# Patient Record
Sex: MALE | Race: WHITE | NOT HISPANIC OR LATINO | Employment: OTHER | URBAN - NONMETROPOLITAN AREA
[De-identification: names, ages, dates, MRNs, and addresses within clinical notes are randomized per-mention and may not be internally consistent; named-entity substitution may affect disease eponyms.]

---

## 2017-07-14 ENCOUNTER — HOSPITAL ENCOUNTER (EMERGENCY)
Facility: HOSPITAL | Age: 22
Discharge: HOME | End: 2017-07-15
Attending: STUDENT IN AN ORGANIZED HEALTH CARE EDUCATION/TRAINING PROGRAM | Admitting: STUDENT IN AN ORGANIZED HEALTH CARE EDUCATION/TRAINING PROGRAM

## 2017-07-14 DIAGNOSIS — T17.208A FOREIGN BODY IN THROAT, INITIAL ENCOUNTER: Primary | ICD-10-CM

## 2017-07-14 DIAGNOSIS — T18.9XXA FOREIGN BODY ALIMENTARY TRACT: ICD-10-CM

## 2017-07-14 PROCEDURE — 25010000002 GLUCAGON (HUMAN RECOMBINANT) 1 MG RECONSTITUTED SOLUTION: Performed by: SURGERY

## 2017-07-14 PROCEDURE — 25010000002 LORAZEPAM PER 2 MG: Performed by: SURGERY

## 2017-07-14 PROCEDURE — 96374 THER/PROPH/DIAG INJ IV PUSH: CPT

## 2017-07-14 PROCEDURE — 99283 EMERGENCY DEPT VISIT LOW MDM: CPT

## 2017-07-14 PROCEDURE — 96375 TX/PRO/DX INJ NEW DRUG ADDON: CPT

## 2017-07-14 RX ORDER — SODIUM CHLORIDE 0.9 % (FLUSH) 0.9 %
10 SYRINGE (ML) INJECTION AS NEEDED
Status: DISCONTINUED | OUTPATIENT
Start: 2017-07-14 | End: 2017-07-15 | Stop reason: HOSPADM

## 2017-07-14 RX ORDER — LORAZEPAM 2 MG/ML
0.5 INJECTION INTRAMUSCULAR ONCE
Status: COMPLETED | OUTPATIENT
Start: 2017-07-14 | End: 2017-07-14

## 2017-07-14 RX ADMIN — GLUCAGON HYDROCHLORIDE 1 MG: 1 INJECTION, POWDER, FOR SOLUTION INTRAMUSCULAR; INTRAVENOUS; SUBCUTANEOUS at 23:44

## 2017-07-14 RX ADMIN — LORAZEPAM 0.5 MG: 2 INJECTION INTRAMUSCULAR; INTRAVENOUS at 23:43

## 2017-07-15 ENCOUNTER — ANESTHESIA (OUTPATIENT)
Dept: PERIOP | Facility: HOSPITAL | Age: 22
End: 2017-07-15

## 2017-07-15 ENCOUNTER — PREP FOR SURGERY (OUTPATIENT)
Dept: OTHER | Facility: HOSPITAL | Age: 22
End: 2017-07-15

## 2017-07-15 ENCOUNTER — ANESTHESIA EVENT (OUTPATIENT)
Dept: PERIOP | Facility: HOSPITAL | Age: 22
End: 2017-07-15

## 2017-07-15 VITALS
BODY MASS INDEX: 25.9 KG/M2 | RESPIRATION RATE: 20 BRPM | TEMPERATURE: 97.3 F | HEART RATE: 91 BPM | SYSTOLIC BLOOD PRESSURE: 123 MMHG | DIASTOLIC BLOOD PRESSURE: 84 MMHG | HEIGHT: 71 IN | WEIGHT: 185 LBS | OXYGEN SATURATION: 97 %

## 2017-07-15 DIAGNOSIS — K21.00 GASTROESOPHAGEAL REFLUX DISEASE WITH ESOPHAGITIS: Primary | ICD-10-CM

## 2017-07-15 PROCEDURE — 25010000002 DEXAMETHASONE PER 1 MG: Performed by: NURSE ANESTHETIST, CERTIFIED REGISTERED

## 2017-07-15 PROCEDURE — 25010000002 MEPERIDINE PER 100 MG: Performed by: NURSE ANESTHETIST, CERTIFIED REGISTERED

## 2017-07-15 PROCEDURE — 25010000002 MIDAZOLAM PER 1 MG: Performed by: NURSE ANESTHETIST, CERTIFIED REGISTERED

## 2017-07-15 PROCEDURE — 25010000002 METOCLOPRAMIDE PER 10 MG: Performed by: NURSE ANESTHETIST, CERTIFIED REGISTERED

## 2017-07-15 PROCEDURE — 25010000002 ONDANSETRON PER 1 MG: Performed by: NURSE ANESTHETIST, CERTIFIED REGISTERED

## 2017-07-15 PROCEDURE — 25010000002 PROPOFOL 1000 MG/100ML EMULSION: Performed by: NURSE ANESTHETIST, CERTIFIED REGISTERED

## 2017-07-15 PROCEDURE — 99245 OFF/OP CONSLTJ NEW/EST HI 55: CPT | Performed by: SURGERY

## 2017-07-15 RX ORDER — ONDANSETRON 2 MG/ML
INJECTION INTRAMUSCULAR; INTRAVENOUS AS NEEDED
Status: DISCONTINUED | OUTPATIENT
Start: 2017-07-15 | End: 2017-07-15 | Stop reason: SURG

## 2017-07-15 RX ORDER — GLYCOPYRROLATE 0.2 MG/ML
INJECTION INTRAMUSCULAR; INTRAVENOUS AS NEEDED
Status: DISCONTINUED | OUTPATIENT
Start: 2017-07-15 | End: 2017-07-15 | Stop reason: SURG

## 2017-07-15 RX ORDER — PROPOFOL 10 MG/ML
INJECTION, EMULSION INTRAVENOUS AS NEEDED
Status: DISCONTINUED | OUTPATIENT
Start: 2017-07-15 | End: 2017-07-15 | Stop reason: SURG

## 2017-07-15 RX ORDER — MIDAZOLAM HYDROCHLORIDE 1 MG/ML
INJECTION INTRAMUSCULAR; INTRAVENOUS AS NEEDED
Status: DISCONTINUED | OUTPATIENT
Start: 2017-07-15 | End: 2017-07-15 | Stop reason: SURG

## 2017-07-15 RX ORDER — OMEPRAZOLE 40 MG/1
40 CAPSULE, DELAYED RELEASE ORAL DAILY
Qty: 30 CAPSULE | Refills: 5 | Status: SHIPPED | OUTPATIENT
Start: 2017-07-15 | End: 2018-07-15

## 2017-07-15 RX ORDER — METOCLOPRAMIDE HYDROCHLORIDE 5 MG/ML
INJECTION INTRAMUSCULAR; INTRAVENOUS AS NEEDED
Status: DISCONTINUED | OUTPATIENT
Start: 2017-07-15 | End: 2017-07-15 | Stop reason: SURG

## 2017-07-15 RX ORDER — MEPERIDINE HYDROCHLORIDE 50 MG/ML
INJECTION INTRAMUSCULAR; INTRAVENOUS; SUBCUTANEOUS AS NEEDED
Status: DISCONTINUED | OUTPATIENT
Start: 2017-07-15 | End: 2017-07-15 | Stop reason: SURG

## 2017-07-15 RX ORDER — DEXAMETHASONE SODIUM PHOSPHATE 4 MG/ML
INJECTION, SOLUTION INTRA-ARTICULAR; INTRALESIONAL; INTRAMUSCULAR; INTRAVENOUS; SOFT TISSUE AS NEEDED
Status: DISCONTINUED | OUTPATIENT
Start: 2017-07-15 | End: 2017-07-15 | Stop reason: SURG

## 2017-07-15 RX ORDER — FAMOTIDINE 10 MG/ML
INJECTION, SOLUTION INTRAVENOUS
Status: DISCONTINUED
Start: 2017-07-15 | End: 2017-07-15 | Stop reason: HOSPADM

## 2017-07-15 RX ORDER — SODIUM CHLORIDE, SODIUM LACTATE, POTASSIUM CHLORIDE, CALCIUM CHLORIDE 600; 310; 30; 20 MG/100ML; MG/100ML; MG/100ML; MG/100ML
INJECTION, SOLUTION INTRAVENOUS CONTINUOUS PRN
Status: DISCONTINUED | OUTPATIENT
Start: 2017-07-15 | End: 2017-07-15 | Stop reason: SURG

## 2017-07-15 RX ORDER — PROPOFOL 10 MG/ML
INJECTION, EMULSION INTRAVENOUS AS NEEDED
Status: DISCONTINUED | OUTPATIENT
Start: 2017-07-15 | End: 2017-07-15

## 2017-07-15 RX ADMIN — FAMOTIDINE 20 MG: 10 INJECTION, SOLUTION INTRAVENOUS at 00:39

## 2017-07-15 RX ADMIN — LIDOCAINE HYDROCHLORIDE 40 MG: 20 INJECTION, SOLUTION INTRAVENOUS at 00:50

## 2017-07-15 RX ADMIN — ONDANSETRON 4 MG: 2 INJECTION INTRAMUSCULAR; INTRAVENOUS at 00:39

## 2017-07-15 RX ADMIN — DEXAMETHASONE SODIUM PHOSPHATE 8 MG: 4 INJECTION, SOLUTION INTRAMUSCULAR; INTRAVENOUS at 00:39

## 2017-07-15 RX ADMIN — METOCLOPRAMIDE 10 MG: 5 INJECTION, SOLUTION INTRAMUSCULAR; INTRAVENOUS at 00:39

## 2017-07-15 RX ADMIN — PROPOFOL 20 MG: 10 INJECTION, EMULSION INTRAVENOUS at 00:49

## 2017-07-15 RX ADMIN — GLYCOPYRROLATE 0.2 MG: 0.2 INJECTION, SOLUTION INTRAMUSCULAR; INTRAVENOUS at 00:39

## 2017-07-15 RX ADMIN — MEPERIDINE HYDROCHLORIDE 50 MG: 50 INJECTION INTRAMUSCULAR; INTRAVENOUS; SUBCUTANEOUS at 00:46

## 2017-07-15 RX ADMIN — PROPOFOL 20 MG: 10 INJECTION, EMULSION INTRAVENOUS at 00:56

## 2017-07-15 RX ADMIN — MIDAZOLAM HYDROCHLORIDE 2 MG: 1 INJECTION, SOLUTION INTRAMUSCULAR; INTRAVENOUS at 00:46

## 2017-07-15 RX ADMIN — SODIUM CHLORIDE, POTASSIUM CHLORIDE, SODIUM LACTATE AND CALCIUM CHLORIDE: 600; 310; 30; 20 INJECTION, SOLUTION INTRAVENOUS at 00:43

## 2017-07-15 RX ADMIN — PROPOFOL 20 MG: 10 INJECTION, EMULSION INTRAVENOUS at 00:52

## 2017-07-15 NOTE — DISCHARGE SUMMARY
24 hour discharge note:    Discharge home  Liquid diet  Advance slowly diet at home  Start on Prilosec OTC 20 mg 2 pills po q day  Followup with Sonia MACK 1 week

## 2017-07-15 NOTE — ADDENDUM NOTE
Addendum  created 07/15/17 1344 by Jairon Diaz, CRNA    Child order released for a procedure order, Order Canceled from Note

## 2017-07-15 NOTE — ED PROVIDER NOTES
Subjective   HPI Comments: 22-year-old male that presents with a food bolus stuck in his throat since 8:00 today.  His had previous similar problems in the past and has tried to get in to see a gastroenterologist but has had difficulty.  The last time have this done was several months ago in [facility].  He states he was eating a hot dog at 8:00 and a piece got stuck he is unable to swallow his saliva.  He also states that he cannot keep down liquids.    Patient is a 22 y.o. male presenting with foreign body.   Foreign Body   Location:  Swallowed  Suspected object:  Food  Pain quality:  Aching  Pain severity:  Mild  Timing:  Constant  Progression:  Unchanged  Chronicity:  Recurrent  Worsened by:  Eating  Ineffective treatments:  None tried  Associated symptoms: trouble swallowing    Risk factors: prior similar events        Review of Systems   HENT: Positive for trouble swallowing.    All other systems reviewed and are negative.      History reviewed. No pertinent past medical history.    No Known Allergies    Past Surgical History:   Procedure Laterality Date   • ESOPHAGOSCOPY / EGD         History reviewed. No pertinent family history.    Social History     Social History   • Marital status: Single     Spouse name: N/A   • Number of children: N/A   • Years of education: N/A     Social History Main Topics   • Smoking status: Never Smoker   • Smokeless tobacco: None   • Alcohol use No   • Drug use: None   • Sexual activity: Not Asked     Other Topics Concern   • None     Social History Narrative   • None           Objective   Physical Exam   Constitutional: He is oriented to person, place, and time. He appears well-developed and well-nourished.   HENT:   Head: Normocephalic and atraumatic.   Left Ear: External ear normal.   Eyes: EOM are normal. Pupils are equal, round, and reactive to light.   Neck: Normal range of motion.   Cardiovascular: Normal rate and regular rhythm.    Pulmonary/Chest: Effort normal  and breath sounds normal.   Abdominal: Soft. Bowel sounds are normal.   Musculoskeletal: Normal range of motion.   Neurological: He is alert and oriented to person, place, and time.   Skin: Skin is warm and dry.   Psychiatric: He has a normal mood and affect. His behavior is normal. Judgment and thought content normal.   Nursing note and vitals reviewed.      Procedures         ED Course  ED Course   Comment By Time   Discussed with Dr. Scott he recommended trying glucagon and something for relaxation he would talk to house supervisor about scheduling this procedure Abilio Bush Jr., PA-C 07/14 2323   Patients nurse received a call from the endoscopy nurse that patient would be going to the endoscopy suite for EGD Abilio Bush Jr., PA-C 07/15 0004                  MDM    Final diagnoses:   Foreign body in throat, initial encounter            Abilio Bush Jr., PA-C  07/15/17 0005       Abilio Bush Jr., PA-C  07/15/17 0006

## 2017-07-15 NOTE — ANESTHESIA POSTPROCEDURE EVALUATION
Patient: [place] Rock    Procedure Summary     Date Anesthesia Start Anesthesia Stop Room / Location    07/15/17 0043   [facility] /  [facility]       Procedure Diagnosis Surgeon Provider    ESOPHAGOGASTRODUODENOSCOPY with bx , removal foreign body (N/A Esophagus) No diagnosis on file. MD Flaco Palacio CRNA          Anesthesia Type: MAC, general  Last vitals  BP      Temp      Pulse     Resp      SpO2        Post Anesthesia Care and Evaluation    Patient location during evaluation: PACU  Patient participation: complete - patient participated  Level of consciousness: awake  Pain score: 0  Pain management: adequate  Airway patency: patent  Anesthetic complications: No anesthetic complications  PONV Status: none  Cardiovascular status: acceptable  Respiratory status: acceptable and nasal cannula  Hydration status: acceptable    Comments: vsss resp spont, reflexes intact, responsive, report given to pacu nurse, on o2 n/c

## 2017-07-15 NOTE — PLAN OF CARE
Problem: Perioperative Period (Adult)  Intervention: Promote Pulmonary Hygiene and Secretion Clearance    07/15/17 0137   Promote Aggressive Pulmonary Hygiene/Secretion Management   Cough And Deep Breathing done with encouragement       Intervention: Monitor/Manage Pain    07/15/17 0137   Safety Interventions   Medication Review/Management medications reviewed       Intervention: Promote Normothermia    07/15/17 0111   Cardiac Interventions   Warming Thermoregulation Maintenance skin exposure time minimized

## 2017-07-15 NOTE — ADDENDUM NOTE
Addendum  created 07/15/17 0814 by Jairon Diaz, CRNA    Child order released for a procedure order, Order Canceled from Note

## 2017-07-15 NOTE — H&P
Crow Donahue Rock    1995    Primary Care Provider: Silvano Cazares MD    Reason for Consultation: esophageal foreign body    Chief Complaint:   Chief Complaint   Patient presents with   • Foreign Body       Subjective .     History of present illness:  I did see the patient today as a consultation from the ER for evaluation and treatment of an esophageal foreign body.  Apparantly the patient did eat a hot dog earlier this evening and now cannot handle his saliva.  This has happened before, there is sharp epigastric pain, nonradiating in nature.  Inability to handle his saliva.    Review of Systems:  Constitutional:  Negative for chills, fever, and unexpected weight change.  HENT: Negative for trouble swallowing and voice change.  Eyes:  Negative for visual disturbance.  Respiratory:  Negative for apnea, cough, chest tightness, shortness of breath, and wheezing.  Cardiovascular:  Negative for chest pain, palpitations, and leg swelling.  Gastrointestinal:  Negative for abdominal distention, abdominal pain, anal bleeding, blood in stool, constipation, diarrhea, nausea, rectal pain, positive inability to handle saliva, + GERD  Musculoskeletal:  Negative for back pain, gait problem, and joint swelling.  Skin:  Negative for color change, rash, and wound  Neurological:  Negative for dizziness, syncope, speech difficulty, weakness, numbness, and headaches.  Hematological:  Negative for adenopathy.  Does not bruise/bleed easily.  Psychiatric/Behavioral:  Negative for confusion.  The patient is not nervous/anxious.        History:    History reviewed. No pertinent past medical history.    Past Surgical History:   Procedure Laterality Date   • ESOPHAGOSCOPY / EGD         History reviewed. No pertinent family history.    Social History     Social History   • Marital status: Single     Spouse name: N/A   • Number of children: N/A   • Years of education: N/A     Occupational History   • Not on file.     Social History  Main Topics   • Smoking status: Never Smoker   • Smokeless tobacco: Not on file   • Alcohol use No   • Drug use: Not on file   • Sexual activity: Not on file     Other Topics Concern   • Not on file     Social History Narrative   • No narrative on file       Allergies:  No Known Allergies    Medications:    Current Facility-Administered Medications:   •  famotidine (PEPCID) 10 MG/ML injection  - ADS Override Pull, , , ,   •  Insert peripheral IV, , , Once **AND** sodium chloride 0.9 % flush 10 mL, 10 mL, Intravenous, PRN, Abilio Bush Jr., JEISON  No current outpatient prescriptions on file.    Objective     Vital Signs:   Temp:  [98.2 °F (36.8 °C)] 98.2 °F (36.8 °C)  Heart Rate:  [78-83] 78  Resp:  [20] 20  BP: (133-134)/(88) 134/88    Physical Exam:   General Appearance:    Alert, cooperative, in no acute distress   Head:    Normocephalic, without obvious abnormality, atraumatic   Eyes:            Lids and lashes normal, conjunctivae and sclerae normal, no   icterus, no pallor, corneas clear, PERRLA   Throat:   No oral lesions, no thrush, oral mucosa moist   Neck:   No adenopathy, supple, trachea midline, no thyromegaly, no   carotid bruit, no JVD   Lungs:     Clear to auscultation,respirations regular, even and                  unlabored    Heart:    Regular rhythm and normal rate, normal S1 and S2, no            murmur, no gallop, no rub, no click   Chest Wall:    No abnormalities observed   Abdomen:     Normal bowel sounds, no masses, no organomegaly, soft        non-tender, non-distended, no guarding, no rebound                tenderness   Extremities:   Moves all extremities well, no edema, no cyanosis, no             redness   Pulses:   Pulses palpable and equal bilaterally   Skin:   No bleeding, bruising or rash   Lymph nodes:   No palpable adenopathy   Neurologic:   Cranial nerves 2 - 12 grossly intact, sensation intact, DTR       present and equal bilaterally   Results Review:   I reviewed the patient's  new clinical results.  I reviewed the patient's new imaging results and agree with the interpretation.    Assessment/Plan     1. Foreign body in throat, initial encounter        I did have a detailed and extensive discussion with the patient in the office today.  The full risks and benefits of operative versus nonoperative intervention were discussed with the paient, they understand, agree, and wish to proceed with the surgical treatment plan of EGD and possible foreign body removal and possible esophageal dilation.    I discussed the patients findings and my recommendations with patient.    Saroj Scott MD  07/15/17  12:35 AM

## 2017-07-15 NOTE — PLAN OF CARE
Problem: Patient Care Overview (Adult)  Goal: Plan of Care Review  Outcome: Outcome(s) achieved Date Met:  07/15/17

## 2017-07-15 NOTE — ANESTHESIA PREPROCEDURE EVALUATION
Anesthesia Evaluation     Patient summary reviewed and Nursing notes reviewed   no history of anesthetic complications:  NPO Solid Status: Waived due to emergency  NPO Liquid Status: Waived due to emergency     Airway   Dental      Pulmonary    (-) not a smoker  Cardiovascular   Exercise tolerance: unable to assess        Neuro/Psych  GI/Hepatic/Renal/Endo      Musculoskeletal     Abdominal    Substance History   (+) drug use ( ? hx of use)     OB/GYN          Other        ROS/Med Hx Other: Hx of dysphagia  ? Tolerance to anes meds  Npo after 2000 hot dog                                   Anesthesia Plan    ASA 2 - emergent     MAC and general   (Risks and benefits discussed including risk of aspiration, recall and dental damage. Possible intubation for airway protection. All patient questions answered. Will continue with POC.)  intravenous induction

## 2017-07-20 LAB
LAB AP CASE REPORT: NORMAL
Lab: NORMAL
PATH REPORT.FINAL DX SPEC: NORMAL

## 2017-08-02 ENCOUNTER — OFFICE VISIT (OUTPATIENT)
Dept: SURGERY | Facility: CLINIC | Age: 22
End: 2017-08-02

## 2017-08-02 VITALS
SYSTOLIC BLOOD PRESSURE: 120 MMHG | TEMPERATURE: 98.8 F | WEIGHT: 185 LBS | OXYGEN SATURATION: 98 % | HEART RATE: 70 BPM | BODY MASS INDEX: 25.9 KG/M2 | DIASTOLIC BLOOD PRESSURE: 80 MMHG | HEIGHT: 71 IN

## 2017-08-02 DIAGNOSIS — K29.50 CHRONIC GASTRITIS WITHOUT BLEEDING, UNSPECIFIED GASTRITIS TYPE: Primary | ICD-10-CM

## 2017-08-02 DIAGNOSIS — K21.00 GASTROESOPHAGEAL REFLUX DISEASE WITH ESOPHAGITIS: ICD-10-CM

## 2017-08-02 PROCEDURE — 99213 OFFICE O/P EST LOW 20 MIN: CPT | Performed by: SURGERY

## 2017-08-02 RX ORDER — CLOTRIMAZOLE AND BETAMETHASONE DIPROPIONATE 10; .64 MG/G; MG/G
1 CREAM TOPICAL 2 TIMES DAILY
COMMUNITY
Start: 2017-06-13 | End: 2017-08-11

## 2017-08-02 RX ORDER — ONDANSETRON 4 MG/1
TABLET, ORALLY DISINTEGRATING ORAL
COMMUNITY
Start: 2017-05-18 | End: 2017-08-11

## 2017-08-02 RX ORDER — LOPERAMIDE HYDROCHLORIDE 2 MG/1
2 CAPSULE ORAL 4 TIMES DAILY PRN
COMMUNITY
Start: 2017-05-18 | End: 2020-03-03

## 2017-08-02 NOTE — PROGRESS NOTES
Patient: Crow Rock    YOB: 1995    Date: 08/02/2017    Primary Care Provider: Silvano Cazares MD    Reason for Consultation: Follow-up EGD    Chief Complaint:   Chief Complaint   Patient presents with   • Follow-up     Follow up EGD       History of present illness:  I saw the patient in the office today as a followup from their recent EGD with biopsy, the pathology report did show minimal chronic gastritis.  They state that they have done well but he did have an episode of food getting stuck one time since the EGD but he was able to get it out. He is taking PPI at this time and this seems to help his chronic GERD.    Review of Systems   Constitutional: Negative for chills, fatigue and fever.   Respiratory: Negative for cough.    Cardiovascular: Negative for chest pain.   Gastrointestinal: Negative for abdominal pain, diarrhea, nausea and vomiting.       Vital Signs:   Temp:  [98.8 °F (37.1 °C)] 98.8 °F (37.1 °C)  Heart Rate:  [70] 70  BP: (120)/(80) 120/80    Allergies:  No Known Allergies    Medications:    Current Outpatient Prescriptions:   •  clotrimazole-betamethasone (LOTRISONE) 1-0.05 % cream, , Disp: , Rfl:   •  loperamide (IMODIUM) 2 MG capsule, , Disp: , Rfl:   •  omeprazole (PRILOSEC) 40 MG capsule, Take 1 capsule by mouth Daily., Disp: 30 capsule, Rfl: 5  •  ondansetron ODT (ZOFRAN-ODT) 4 MG disintegrating tablet, , Disp: , Rfl:     Physical Exam:   General Appearance:    Alert, cooperative, in no acute distress   Abdomen:     no masses, no organomegaly, soft non-tender, non-distended, no guarding, wounds are well healed, no evidence of recurrent hernia   Chest:      Clear toausculation            Cor:  Regular rate and rhythm      Assessment / Plan:    1. Chronic gastritis without bleeding, unspecified gastritis type    2. Gastroesophageal reflux disease with esophagitis        I did discuss the situation with the patient today in the office and they have done well from their  recent EGD with biopsy. I have told the patient that he needs to stay on his PPI for now.  I think that he needs to have another EGD with dilation due to continued difficulty with swallowing.  I did have a detailed and extensive discussion with the patient in the office today.  The full risks and benefits of operative versus nonoperative intervention were discussed with the paient, they understand, agree, and wish to proceed with the surgical treatment plan EGD with dilation.    Electronically signed by Saroj Scott MD  08/02/17

## 2017-08-24 ENCOUNTER — ANESTHESIA (OUTPATIENT)
Dept: GASTROENTEROLOGY | Facility: HOSPITAL | Age: 22
End: 2017-08-24

## 2017-08-24 ENCOUNTER — HOSPITAL ENCOUNTER (OUTPATIENT)
Facility: HOSPITAL | Age: 22
Setting detail: HOSPITAL OUTPATIENT SURGERY
Discharge: HOME | End: 2017-08-24
Attending: SURGERY | Admitting: SURGERY

## 2017-08-24 ENCOUNTER — ANESTHESIA EVENT (OUTPATIENT)
Dept: GASTROENTEROLOGY | Facility: HOSPITAL | Age: 22
End: 2017-08-24

## 2017-08-24 VITALS
OXYGEN SATURATION: 96 % | HEART RATE: 65 BPM | HEIGHT: 71 IN | SYSTOLIC BLOOD PRESSURE: 111 MMHG | DIASTOLIC BLOOD PRESSURE: 71 MMHG | WEIGHT: 185 LBS | BODY MASS INDEX: 25.9 KG/M2 | TEMPERATURE: 98 F | RESPIRATION RATE: 16 BRPM

## 2017-08-24 DIAGNOSIS — K29.50 CHRONIC GASTRITIS WITHOUT BLEEDING, UNSPECIFIED GASTRITIS TYPE: ICD-10-CM

## 2017-08-24 DIAGNOSIS — K21.00 GASTROESOPHAGEAL REFLUX DISEASE WITH ESOPHAGITIS: ICD-10-CM

## 2017-08-24 PROCEDURE — 25010000002 PROPOFOL 200 MG/20ML EMULSION: Performed by: NURSE ANESTHETIST, CERTIFIED REGISTERED

## 2017-08-24 PROCEDURE — 25010000002 MIDAZOLAM PER 1 MG: Performed by: NURSE ANESTHETIST, CERTIFIED REGISTERED

## 2017-08-24 PROCEDURE — C1726 CATH, BAL DIL, NON-VASCULAR: HCPCS | Performed by: SURGERY

## 2017-08-24 RX ORDER — PROPOFOL 10 MG/ML
INJECTION, EMULSION INTRAVENOUS AS NEEDED
Status: DISCONTINUED | OUTPATIENT
Start: 2017-08-24 | End: 2017-08-24 | Stop reason: SURG

## 2017-08-24 RX ORDER — SODIUM CHLORIDE 0.9 % (FLUSH) 0.9 %
3 SYRINGE (ML) INJECTION AS NEEDED
Status: DISCONTINUED | OUTPATIENT
Start: 2017-08-24 | End: 2017-08-24 | Stop reason: HOSPADM

## 2017-08-24 RX ORDER — MIDAZOLAM HYDROCHLORIDE 1 MG/ML
INJECTION INTRAMUSCULAR; INTRAVENOUS AS NEEDED
Status: DISCONTINUED | OUTPATIENT
Start: 2017-08-24 | End: 2017-08-24 | Stop reason: SURG

## 2017-08-24 RX ORDER — SODIUM CHLORIDE, SODIUM LACTATE, POTASSIUM CHLORIDE, CALCIUM CHLORIDE 600; 310; 30; 20 MG/100ML; MG/100ML; MG/100ML; MG/100ML
1000 INJECTION, SOLUTION INTRAVENOUS CONTINUOUS PRN
Status: DISCONTINUED | OUTPATIENT
Start: 2017-08-24 | End: 2017-08-24 | Stop reason: HOSPADM

## 2017-08-24 RX ADMIN — PROPOFOL 60 MG: 10 INJECTION, EMULSION INTRAVENOUS at 13:10

## 2017-08-24 RX ADMIN — MIDAZOLAM HYDROCHLORIDE 2 MG: 1 INJECTION, SOLUTION INTRAMUSCULAR; INTRAVENOUS at 13:06

## 2017-08-24 RX ADMIN — SODIUM CHLORIDE, POTASSIUM CHLORIDE, SODIUM LACTATE AND CALCIUM CHLORIDE 1000 ML: 600; 310; 30; 20 INJECTION, SOLUTION INTRAVENOUS at 10:28

## 2017-08-24 RX ADMIN — PROPOFOL 70 MG: 10 INJECTION, EMULSION INTRAVENOUS at 13:13

## 2017-08-24 RX ADMIN — LIDOCAINE HYDROCHLORIDE 100 MG: 20 INJECTION, SOLUTION INTRAVENOUS at 13:10

## 2017-08-24 NOTE — ANESTHESIA PREPROCEDURE EVALUATION
Anesthesia Evaluation     Patient summary reviewed and Nursing notes reviewed   no history of anesthetic complications:  NPO Solid Status: > 8 hours       Airway   Mallampati: I  TM distance: >3 FB  Neck ROM: full  no difficulty expected  Dental - normal exam   (+) poor dentition    Pulmonary - negative pulmonary ROS and normal exam   Cardiovascular - negative cardio ROS and normal exam    Rhythm: regular  Rate: normal        Neuro/Psych- negative ROS  GI/Hepatic/Renal/Endo    (+)  GERD well controlled,     Musculoskeletal (-) negative ROS    Abdominal  - normal exam    Abdomen: soft.  Bowel sounds: normal.   Substance History - negative use     OB/GYN negative ob/gyn ROS         Other - negative ROS                                       Anesthesia Plan    ASA 2     MAC   (Risks and benefits discussed including risk of aspiration, recall and dental damage. All patient questions answered. Will continue with POC.)  intravenous induction   Anesthetic plan and risks discussed with patient.

## 2017-08-24 NOTE — DISCHARGE INSTRUCTIONS
To assist you in voiding:  Drink plenty of fluids  Listen to running water while attempting to void.    If you are unable to urinate and you have an uncomfortable urge to void or it has been   6 hours since you were discharged, return to the Emergency Room

## 2017-08-24 NOTE — PLAN OF CARE
Problem: GI Endoscopy (Adult)  Goal: Signs and Symptoms of Listed Potential Problems Will be Absent or Manageable (GI Endoscopy)  Outcome: Ongoing (interventions implemented as appropriate)    08/24/17 1003   GI Endoscopy   Problems Assessed (GI Endoscopy) all   Problems Present (GI Endoscopy) none

## 2017-08-24 NOTE — ANESTHESIA POSTPROCEDURE EVALUATION
Patient: [place]    Procedure Summary     Date Anesthesia Start Anesthesia Stop Room / Location    08/24/17 1304 1322 [facility] ENDOSCOPY 3 / [facility] ENDOSCOPY       Procedure Diagnosis Surgeon Provider    ESOPHAGOGASTRODUODENOSCOPY ESOPHAGEAL DILATATION (N/A Esophagus) Gastroesophageal reflux disease with esophagitis; Chronic gastritis without bleeding, unspecified gastritis type  (Gastroesophageal reflux disease with esophagitis [K21.0]; Chronic gastritis without bleeding, unspecified gastritis type [K29.50]) MD Frederick Palacio CRNA          Anesthesia Type: MAC  Last vitals  BP   114/70 (08/24/17 1330)    Temp   98 °F (36.7 °C) (08/24/17 1330)    Pulse   69 (08/24/17 1330)   Resp   16 (08/24/17 1330)    SpO2   98 % (08/24/17 1330)      Post Anesthesia Care and Evaluation    Patient location during evaluation: bedside  Patient participation: complete - patient participated  Level of consciousness: awake and alert  Pain management: satisfactory to patient  Airway patency: patent  Anesthetic complications: No anesthetic complications  PONV Status: controlled  Cardiovascular status: acceptable and stable  Respiratory status: acceptable  Hydration status: acceptable

## 2017-08-29 LAB
LAB AP CASE REPORT: NORMAL
Lab: NORMAL
PATH REPORT.FINAL DX SPEC: NORMAL

## 2019-04-09 ENCOUNTER — ANESTHESIA EVENT (OUTPATIENT)
Dept: GASTROENTEROLOGY | Facility: HOSPITAL | Age: 24
End: 2019-04-09

## 2019-04-09 ENCOUNTER — ANESTHESIA (OUTPATIENT)
Dept: GASTROENTEROLOGY | Facility: HOSPITAL | Age: 24
End: 2019-04-09

## 2019-04-09 ENCOUNTER — HOSPITAL ENCOUNTER (EMERGENCY)
Facility: HOSPITAL | Age: 24
Discharge: HOME | End: 2019-04-09
Attending: EMERGENCY MEDICINE | Admitting: INTERNAL MEDICINE

## 2019-04-09 VITALS
BODY MASS INDEX: 30.74 KG/M2 | OXYGEN SATURATION: 97 % | DIASTOLIC BLOOD PRESSURE: 86 MMHG | WEIGHT: 219.6 LBS | SYSTOLIC BLOOD PRESSURE: 114 MMHG | HEART RATE: 84 BPM | RESPIRATION RATE: 16 BRPM | HEIGHT: 71 IN | TEMPERATURE: 97.9 F

## 2019-04-09 DIAGNOSIS — T18.9XXA FOREIGN BODY IN DIGESTIVE TRACT: ICD-10-CM

## 2019-04-09 PROCEDURE — 88342 IMHCHEM/IMCYTCHM 1ST ANTB: CPT | Performed by: INTERNAL MEDICINE

## 2019-04-09 PROCEDURE — 99284 EMERGENCY DEPT VISIT MOD MDM: CPT

## 2019-04-09 PROCEDURE — 25010000002 ONDANSETRON PER 1 MG: Performed by: NURSE ANESTHETIST, CERTIFIED REGISTERED

## 2019-04-09 PROCEDURE — 25010000002 ONDANSETRON PER 1 MG: Performed by: EMERGENCY MEDICINE

## 2019-04-09 PROCEDURE — S0260 H&P FOR SURGERY: HCPCS | Performed by: INTERNAL MEDICINE

## 2019-04-09 PROCEDURE — 88312 SPECIAL STAINS GROUP 1: CPT | Performed by: INTERNAL MEDICINE

## 2019-04-09 PROCEDURE — 96374 THER/PROPH/DIAG INJ IV PUSH: CPT

## 2019-04-09 PROCEDURE — 88305 TISSUE EXAM BY PATHOLOGIST: CPT | Performed by: INTERNAL MEDICINE

## 2019-04-09 PROCEDURE — 25010000002 PROPOFOL 10 MG/ML EMULSION: Performed by: NURSE ANESTHETIST, CERTIFIED REGISTERED

## 2019-04-09 PROCEDURE — 43247 EGD REMOVE FOREIGN BODY: CPT | Performed by: INTERNAL MEDICINE

## 2019-04-09 PROCEDURE — 25010000002 GLUCAGON (HUMAN RECOMBINANT) 1 MG RECONSTITUTED SOLUTION: Performed by: EMERGENCY MEDICINE

## 2019-04-09 PROCEDURE — 96376 TX/PRO/DX INJ SAME DRUG ADON: CPT

## 2019-04-09 PROCEDURE — C1726 CATH, BAL DIL, NON-VASCULAR: HCPCS | Performed by: INTERNAL MEDICINE

## 2019-04-09 PROCEDURE — 99283 EMERGENCY DEPT VISIT LOW MDM: CPT

## 2019-04-09 PROCEDURE — 43239 EGD BIOPSY SINGLE/MULTIPLE: CPT | Performed by: INTERNAL MEDICINE

## 2019-04-09 PROCEDURE — 43249 ESOPH EGD DILATION <30 MM: CPT | Performed by: INTERNAL MEDICINE

## 2019-04-09 PROCEDURE — 96375 TX/PRO/DX INJ NEW DRUG ADDON: CPT

## 2019-04-09 RX ORDER — PROPOFOL 10 MG/ML
VIAL (ML) INTRAVENOUS AS NEEDED
Status: DISCONTINUED | OUTPATIENT
Start: 2019-04-09 | End: 2019-04-09 | Stop reason: SURG

## 2019-04-09 RX ORDER — ONDANSETRON 2 MG/ML
INJECTION INTRAMUSCULAR; INTRAVENOUS AS NEEDED
Status: DISCONTINUED | OUTPATIENT
Start: 2019-04-09 | End: 2019-04-09 | Stop reason: SURG

## 2019-04-09 RX ORDER — MAGNESIUM HYDROXIDE 1200 MG/15ML
LIQUID ORAL AS NEEDED
Status: DISCONTINUED | OUTPATIENT
Start: 2019-04-09 | End: 2019-04-09 | Stop reason: HOSPADM

## 2019-04-09 RX ORDER — AMOXICILLIN 250 MG/1
250 CAPSULE ORAL 3 TIMES DAILY
COMMUNITY
End: 2019-05-06

## 2019-04-09 RX ORDER — PANTOPRAZOLE SODIUM 40 MG/10ML
40 INJECTION, POWDER, LYOPHILIZED, FOR SOLUTION INTRAVENOUS ONCE
Status: COMPLETED | OUTPATIENT
Start: 2019-04-09 | End: 2019-04-09

## 2019-04-09 RX ORDER — SODIUM CHLORIDE 9 MG/ML
125 INJECTION, SOLUTION INTRAVENOUS CONTINUOUS
Status: DISCONTINUED | OUTPATIENT
Start: 2019-04-09 | End: 2019-04-09 | Stop reason: HOSPADM

## 2019-04-09 RX ORDER — KETAMINE HYDROCHLORIDE 50 MG/ML
INJECTION, SOLUTION, CONCENTRATE INTRAMUSCULAR; INTRAVENOUS AS NEEDED
Status: DISCONTINUED | OUTPATIENT
Start: 2019-04-09 | End: 2019-04-09 | Stop reason: SURG

## 2019-04-09 RX ORDER — NITROGLYCERIN 0.4 MG/1
0.4 TABLET SUBLINGUAL
Status: DISCONTINUED | OUTPATIENT
Start: 2019-04-09 | End: 2019-04-09 | Stop reason: HOSPADM

## 2019-04-09 RX ORDER — PANTOPRAZOLE SODIUM 40 MG/1
TABLET, DELAYED RELEASE ORAL
Qty: 30 TABLET | Refills: 3 | Status: SHIPPED | OUTPATIENT
Start: 2019-04-09 | End: 2019-08-12 | Stop reason: SDUPTHER

## 2019-04-09 RX ORDER — ONDANSETRON 2 MG/ML
4 INJECTION INTRAMUSCULAR; INTRAVENOUS ONCE
Status: COMPLETED | OUTPATIENT
Start: 2019-04-09 | End: 2019-04-09

## 2019-04-09 RX ORDER — NITROGLYCERIN 0.4 MG/1
0.4 TABLET SUBLINGUAL ONCE
Status: COMPLETED | OUTPATIENT
Start: 2019-04-09 | End: 2019-04-09

## 2019-04-09 RX ADMIN — PROPOFOL 50 MG: 10 INJECTION, EMULSION INTRAVENOUS at 07:43

## 2019-04-09 RX ADMIN — SODIUM CHLORIDE 125 ML/HR: 9 INJECTION, SOLUTION INTRAVENOUS at 06:46

## 2019-04-09 RX ADMIN — PROPOFOL 100 MG: 10 INJECTION, EMULSION INTRAVENOUS at 07:27

## 2019-04-09 RX ADMIN — PROPOFOL 100 MG: 10 INJECTION, EMULSION INTRAVENOUS at 07:30

## 2019-04-09 RX ADMIN — ONDANSETRON 4 MG: 2 INJECTION INTRAMUSCULAR; INTRAVENOUS at 07:17

## 2019-04-09 RX ADMIN — GLUCAGON HYDROCHLORIDE 1 MG: KIT at 01:46

## 2019-04-09 RX ADMIN — PROPOFOL 50 MG: 10 INJECTION, EMULSION INTRAVENOUS at 07:33

## 2019-04-09 RX ADMIN — KETAMINE HYDROCHLORIDE 20 MG: 50 INJECTION, SOLUTION INTRAMUSCULAR; INTRAVENOUS at 07:31

## 2019-04-09 RX ADMIN — PROPOFOL 50 MG: 10 INJECTION, EMULSION INTRAVENOUS at 07:41

## 2019-04-09 RX ADMIN — PROPOFOL 50 MG: 10 INJECTION, EMULSION INTRAVENOUS at 07:36

## 2019-04-09 RX ADMIN — LIDOCAINE HYDROCHLORIDE 80 MG: 20 INJECTION, SOLUTION INTRAVENOUS at 07:27

## 2019-04-09 RX ADMIN — NITROGLYCERIN 0.4 MG: 0.4 TABLET SUBLINGUAL at 02:27

## 2019-04-09 RX ADMIN — GLUCAGON HYDROCHLORIDE 1 MG: 1 INJECTION, POWDER, FOR SOLUTION INTRAMUSCULAR; INTRAVENOUS; SUBCUTANEOUS at 02:25

## 2019-04-09 RX ADMIN — PANTOPRAZOLE SODIUM 40 MG: 40 INJECTION, POWDER, FOR SOLUTION INTRAVENOUS at 08:16

## 2019-04-09 RX ADMIN — ONDANSETRON 4 MG: 2 INJECTION INTRAMUSCULAR; INTRAVENOUS at 01:45

## 2019-04-09 RX ADMIN — NITROGLYCERIN 0.4 MG: 0.4 TABLET SUBLINGUAL at 01:25

## 2019-04-09 NOTE — ANESTHESIA PREPROCEDURE EVALUATION
Anesthesia Evaluation     Patient summary reviewed and Nursing notes reviewed   no history of anesthetic complications:  NPO Solid Status: > 8 hours  NPO Liquid Status: > 8 hours           Airway   Mallampati: I  TM distance: >3 FB  Neck ROM: full  no difficulty expected  Dental - normal exam   (+) poor dentition    Pulmonary - negative pulmonary ROS and normal exam   Cardiovascular - negative cardio ROS and normal exam    Rhythm: regular  Rate: normal        Neuro/Psych- negative ROS  GI/Hepatic/Renal/Endo    (+) obesity,  GERD,      Musculoskeletal (-) negative ROS    Abdominal  - normal exam    Abdomen: soft.  Bowel sounds: normal.   Substance History - negative use     OB/GYN negative ob/gyn ROS         Other - negative ROS                       Anesthesia Plan    ASA 2 - emergent     MAC   (Risks and benefits discussed including risk of aspiration, recall and dental damage. All patient questions answered. Will continue with POC.)  intravenous induction   Anesthetic plan, all risks, benefits, and alternatives have been provided, discussed and informed consent has been obtained with: patient.

## 2019-04-09 NOTE — ED NOTES
Pt undressed and placed in gown in anticipation of going to the OR this morning. Warm blanket and pillow provided. Pt up to date on treatment plan at this time. No further needs. Will con't to monitor.      Olimpia Justin RN  04/09/19 0321

## 2019-04-09 NOTE — ED PROVIDER NOTES
Subjective   History of Present Illness  Chief Complaint: Foreign body sensation to throat  History of Present Illness: Patient presents after eating dinner tonight approximately 730, he noticed he was unable to swallow a hamburger.  He does have a history of prior stricture dilation x2, last was 2 years ago denies any GI bleeding.  Patient states he is unable to swallow any liquids or solids.  Onset: Tonight approximately 7:30 PM  Duration: Continuous  Exacerbating / Alleviating factors: Worse with swallowing and p.o. intake  ASSOCIATED SYMPTOMS: Patient has painful swallowing as well      Nurses Notes reviewed and agree, including vitals, allergies, social history and prior medical history.     REVIEW OF SYSTEMS: All systems reviewed and not pertinent unless noted.  Positive for: Painful swallowing with inability to swallow even saliva  Negative for: GI bleeding      Past Medical History:   Diagnosis Date   • Dysphagia     MOTHER REPORTS PATIENT HAS RECENTLY HAS GOTTEN CHOKED EASILY AND GETS FOOD STUCK IN THROAT.   • GERD (gastroesophageal reflux disease)    • H/O seasonal allergies    • Wears glasses        No Known Allergies    Past Surgical History:   Procedure Laterality Date   • EAR TUBES     • ENDOSCOPY N/A 7/15/2017    Procedure: ESOPHAGOGASTRODUODENOSCOPY with bx , removal foreign body;  Surgeon: Saroj Scott MD;  Location: [facility] OR;  Service:    • ENDOSCOPY N/A 8/24/2017    Procedure: ESOPHAGOGASTRODUODENOSCOPY ESOPHAGEAL DILATATION;  Surgeon: Saroj Scott MD;  Location: [facility] ENDOSCOPY;  Service:    • ESOPHAGOSCOPY / EGD         Family History   Problem Relation Age of Onset   • No Known Problems Mother    • No Known Problems Father        Social History     Socioeconomic History   • Marital status: Single     Spouse name: Not on file   • Number of children: Not on file   • Years of education: Not on file   • Highest education level: Not on file   Tobacco Use   • Smoking status: Never Smoker   •  Smokeless tobacco: Never Used   Substance and Sexual Activity   • Alcohol use: No   • Drug use: No   • Sexual activity: Defer           Objective   Physical Exam  EXAM:    GENERAL APPEARANCE: Well developed, well nourished, in no acute distress.  VITAL SIGNS: per nursing, reviewed and noted  SKIN: no rashes, ulcerations or petechiae.  Head: Normocephalic, atraumatic.   EYES: perrla. EOMI.  ENT:  TM clear, posterior pharynx patent.  Voice is normal, attempts at swallowing are unsuccessful and patient is spitting his secretions.  LUNGS:  normal breath sounds. No retractions.   CARDIOVASCULAR:  regular rate and rhythm, no murmurs.  Good Peripheral pulses.  ABDOMEN: Soft, nontender, normal bowel sounds. No hernia. No ascites.  MUSCULOSKELETAL:  No tenderness. Full ROM. Strength and tone normal.  NEUROLOGIC: Alert, oriented x 3. No gross deficits.   NECK: Supple, symmetric. No tenderness, no masses. Full ROM  Back: full rom, no paraspinal spasm. No CVA tenderness.        No ER procedures were performed    ED Course  ED Course as of Apr 09 2242   Tue Apr 09, 2019   0305 Discussed with Dr. Herzog, will add on case this am, appr. 0700, for EGD. Requested npo  [PF]      ED Course User Index  [PF] Layo Celestin,                   MDM  Patient presents with a symptomatic meat bolus obstruction with a history of esophageal strictures requiring dilation x2.  Patient is unable to handle secretions, conservative measures are unable to remedy the situation. Discussed with gastroenterologist Dr. Harvey, who will proceed with endoscopy.     Final diagnoses:   Foreign body in digestive tract            Layo Celestin DO  04/09/19 2246

## 2019-04-09 NOTE — OP NOTE
PROCEDURE:  Upper Endoscopy with removal of esophageal foreign body, serial dilation of the mid and distal esophagus using 10-11-12 millimeters CRE balloon to 12 mm and biopsies.    DATE OF PROCEDURE: April 9, 2019.    REFERRING PROVIDER:  Silvano Cazares MD.     INSTRUMENT: Olympus GIF H 190 video endoscope     INDICATIONS OF THE PROCEDURE: This is a 23-year-old white male with history of recurrent dysphagia and esophageal foreign body sensation after eating last evening.  Currently, the patient is undergoing upper endoscopy for further evaluation and intervention.     BIOPSIES: Gastric-prepyloric ulcer edges.  Biopsies were obtained from the distal and mid esophagus.     MEDICATIONS:  MAC.     PHOTOGRAPHS:  Photographs were included in the medical records.     CONSENT/PREPROCEDURE EVALUATION:  Risks, benefits, alternatives and options of the procedure including risks of anesthesia/sedation were discussed and informed consent was obtained prior to the procedure. History and physical examination were performed and nothing precluded the test.     REPORT:  The patient was placed in left lateral decubitus position. Once under the influence of IV sedation, the instrument was inserted into the mouth and esophagus was intubated under direct vision without difficulty.    Visualized portions of the hypopharyngeal, and laryngopharyngeal and partial view of the vocal cords did not reveal significant pathology.    Esophagus: Findings and intervention:     A food bolus was noted lodged into the lower portion of the mid third of the esophagus.  This was removed with Hernandez net and recovered.  Rather tight concentric rings were noted within the mid and distal esophagus.  A 9.6 mm scope could be advanced with some difficulty.  These areas were biopsied.  Erosive distal esophagitis. LA class A.  Z line was noted to be around 38 cm.    A small sliding hiatal hernia less than 3 cm was noted.  No Liriano's esophagus was seen.   Distal and mid esophagus were dilated using 10-11-12 mm CRE balloon serially to 12 mm without difficulty under vision.  A small tear and some blood were noted.  No active bleeding was seen.     Stomach:  Antrum:  Erythematous-erosive gastritis.  Angulus, lesser and greater curves: Normal.  Retroflex examination: Sliding hiatal hernia.  Cardia and fundus:  Normal.     Body of the stomach: Erythematous gastritis.  Good distensibility of the stomach was achieved no giant folds were noted.       Pylorus and pyloric channel: 10 x 5 mm clean-based ulcer was noted at the prepyloric area.  This was biopsied.     Duodenum:  Bulb: Normal.  Second portion: normal.  No scalloping was seen in the second portion of duodenum.        The upper GI tract was decompressed and the scope was pulled out of the patient. The patient tolerated the procedure well.     DIAGNOSES:     1. Esophageal foreign body.  Status post removal.  2. Tight esophageal concentric rings.  Status post serial dilation to 12 mm.  3. Erosive distal esophagitis. LA class A.  4. Small sliding hiatal hernia less than 3 cm.  5. Erythematous-erosive gastritis.  6. 10 x 5 mm clean base prepyloric ulcer.  7. Changes seen within the esophagus are suggestive of eosinophilic esophagitis.    RECOMMENDATIONS:  1.  Dietary instructions.  2.  Pantoprazole 40 mg 1 p.o. q.a.m. 1/2 hour before breakfast.  3.  Follow biopsies.  4.  Follow up in office.  5.  Precautions should be taken regarding medications.  The patient is at significant risk for pill esophagitis.     Thank you very much for letting me participate in the care of this patient. Please do not hesitate to call me if you have any questions.

## 2019-04-09 NOTE — DISCHARGE INSTRUCTIONS
No pushing, pulling, tugging,  heavy lifting, or strenuous activity.  No major decision making, driving, or drinking alcoholic beverages for 24 hours. ( due to the medications you have  received)  Always use good hand hygiene/washing techniques.  NO driving while taking pain medications.    To assist you in voiding:  Drink plenty of fluids  Listen to running water while attempting to void.    If you are unable to urinate and you have an uncomfortable urge to void or it has been   6 hours since you were discharged, return to the Emergency Room  ************************************************************************************    Postprocedure instructions:  1. Nothing by mouth until fully alert and as specified below .  2. Bedrest until fully alert.  3. Vital signs as routine.    Diet:   Nothing by mouth for 60 minutes.  Please call after 60 minutes regarding progress.    Then if no chest pains the patient may have Clear liquids diet (No Sodas) for over night.  May advance to soft diet at 6 am on April 10, 2019 if no chest pains, Fever or chills, nausea vomiting or bleeding.    Other instructions:  1. The patient may eat in upright position, chew well, take small bites and take medications in upright position.   2. The patient should drink water after 3-4 bites, and liberally with medications.   3. The patient should remain upright for about 10 minutes after eating and taking oral medications.      Blood Thinner and other medications Directions:  Avoid Aspirin & other NSAIDS for 7 days.  Tylenol is okay.    Other instructions:  The patient should avoid medications that have a potential to cause pill esophagitis including potassium pills, tetracycline capsules, iron pills, NSAIDs and bisphosphonates.      Follow-up:    DR. RICK ZAMORA in 3-4 weeks.Office phone #  [phone].      ********************************************************************************************************************************************************    Notes to the patient and the family from Dr. Herzog.     Dear patient/family member,    Findings on today's procedure are as follows:    1. Removal of foreign body from the food pipe.  2. Esophagitis. Inflammation of the esophagus.  3. Tight esophageal rings. These areas were stretched to some degree.  However, the opening is still not up to the mala.  This will require further evaluation and stretching in the future.  4. Inflammation of the stomach.  Erosive gastritis.  5. Small sliding hiatal hernia.  6. No cancer. No active ulcers.   7. Stomach ulcer.  Clean base.  Risks of bleeding from clean base stomach ulcer or less than 5%.    Recommendations:    1. Protonix (Pantoprazole) tablet 40 mg tablet. Take 1 tablet orally in the morning half an hour before eating every day.  2. Other instructions as above.      Should you have more questions please do not hesitate to talk to the nurse who can call me and let me talk to you.     I hope you feel better.    Tyrone Herzog M.D., FACP, FACG.

## 2019-04-09 NOTE — H&P
Chief complaint: Dysphagia.  Foreign body sensation in the esophagus.  The patient has history of recurrent dysphagia.      History of present illness: About 2 years ago foreign body esophageal obstruction requiring endoscopic by surgical service-Dr. Scott.  This occurred again 5 weeks later.  The patient has reflux.  He denies shortness of breath.  The patient has difficulty swallowing his own saliva.  The patient has some mouth infection for which he has been taking amoxicillin.  The patient had some diarrhea last week.  He denies chest pains.  The patient gags at times.    Past medical history:   Past Medical History:   Diagnosis Date   • Dysphagia     MOTHER REPORTS PATIENT HAS RECENTLY HAS GOTTEN CHOKED EASILY AND GETS FOOD STUCK IN THROAT.   • GERD (gastroesophageal reflux disease)    • H/O seasonal allergies    • Wears glasses        Surgical history:    Past Surgical History:   Procedure Laterality Date   • EAR TUBES     • ENDOSCOPY N/A 7/15/2017    Procedure: ESOPHAGOGASTRODUODENOSCOPY with bx , removal foreign body;  Surgeon: Saroj Scott MD;  Location: [facility] OR;  Service:    • ENDOSCOPY N/A 8/24/2017    Procedure: ESOPHAGOGASTRODUODENOSCOPY ESOPHAGEAL DILATATION;  Surgeon: Saroj Scott MD;  Location: [facility] ENDOSCOPY;  Service:    • ESOPHAGOSCOPY / EGD         Social history:  Social History     Socioeconomic History   • Marital status: Single     Spouse name: Not on file   • Number of children: Not on file   • Years of education: Not on file   • Highest education level: Not on file   Tobacco Use   • Smoking status: Never Smoker   • Smokeless tobacco: Never Used   Substance and Sexual Activity   • Alcohol use: No   • Drug use: No   • Sexual activity: Defer       Allergies:  Patient has no known allergies.  Latex allergy: None  Contrast allergy: None    Medications:  Medications Prior to Admission   Medication Sig Dispense Refill Last Dose   • amoxicillin (AMOXIL) 250 MG capsule Take 250 mg by  "mouth 3 (Three) Times a Day.   4/8/2019 at 1500   • loperamide (IMODIUM) 2 MG capsule Take 2 mg by mouth 4 (Four) Times a Day As Needed for Diarrhea.   Past Week at Unknown time       Review of systems:   Constitutional: No recent: Fever, Weight loss,   Respiratory: No recent: SOB, Cough,   Cardiovascular: No recent: Chest Pains, congestive heart failure or arrhythmias.   Neurological: No recent: Seizures, CVA, TIA.   Genitourinary: No recent: Renal Failure, UTI.  Endocrine: No recent: Worsening of diabetes or thyroid disease.  Musculoskeletal: No recent: Joint swelling.  Hem. Oncology: No recent: Anemia or bleeding.  Psychiatric: No recent: Worsening of depression or anxiety.     VITAL SIGNS:    Blood pressure 122/74, pulse 71, temperature 98.3 °F (36.8 °C), resp. rate 16, height 180.3 cm (71\"), weight 99.6 kg (219 lb 9.6 oz), SpO2 98 %.    PHYSICAL EXAMINATION:   HEENT: Normal.   Lungs: Clear to auscultation.  Heart: No S3, no murmur.    Abdomen: Soft.  BS+ ND, NT  Extremities: No edema.  No cyanosis.  Neuro: Alert X 3. No focal deficit.    Assessment: Recurrent dysphagia.  Esophageal foreign body sensation likely secondary to impacted meat bolus.    Plan:   EGD with removal of foreign body.  Risks/Benefits:  The potential benefits, risk and/or side effects of the procedure and alternatives have been discussed with the patient/authorized representative and questions were answered.  A possibility of endotracheal intubation to protect the upper airway was also discussed with the patient and his mother if necessary.    "

## 2019-04-09 NOTE — ANESTHESIA POSTPROCEDURE EVALUATION
Patient: [place]    Procedure Summary     Date:  04/09/19 Room / Location:  [facility] ENDOSCOPY 2 / [facility] ENDOSCOPY    Anesthesia Start:  0715 Anesthesia Stop:      Procedure:  ESOPHAGOGASTRODUODENOSCOPY FOR FOREIGN BODY, biopsy, and esophageal dilitation (N/A Esophagus) Diagnosis:  (Food Bolus)    Surgeon:  Tyrone Herzog MD Provider:  Rios Choudhury CRNA    Anesthesia Type:  MAC ASA Status:  2 - Emergent          Anesthesia Type: MAC  Last vitals  BP   91/41   Temp   98   Pulse   86   Resp   22   SpO2   97     Post Anesthesia Care and Evaluation    Patient location during evaluation: PACU  Patient participation: complete - patient participated  Level of consciousness: awake and alert  Pain score: 0  Pain management: satisfactory to patient  Airway patency: patent  Anesthetic complications: No anesthetic complications  PONV Status: none  Cardiovascular status: acceptable and stable  Respiratory status: acceptable, room air and nasal cannula  Hydration status: acceptable

## 2019-04-09 NOTE — ED NOTES
DR. ZAMORA CALLED PER DR. VELÁSQUEZ, CALL SENT TO HIM @ THIS TIME.     Mera Garibay  04/09/19 0252

## 2019-05-06 ENCOUNTER — PREP FOR SURGERY (OUTPATIENT)
Dept: OTHER | Facility: HOSPITAL | Age: 24
End: 2019-05-06

## 2019-05-06 ENCOUNTER — OFFICE VISIT (OUTPATIENT)
Dept: GASTROENTEROLOGY | Facility: CLINIC | Age: 24
End: 2019-05-06

## 2019-05-06 VITALS
DIASTOLIC BLOOD PRESSURE: 93 MMHG | HEART RATE: 90 BPM | HEIGHT: 71 IN | RESPIRATION RATE: 20 BRPM | TEMPERATURE: 98.5 F | BODY MASS INDEX: 30.52 KG/M2 | SYSTOLIC BLOOD PRESSURE: 131 MMHG | WEIGHT: 218 LBS

## 2019-05-06 DIAGNOSIS — R12 HEARTBURN: ICD-10-CM

## 2019-05-06 DIAGNOSIS — R13.19 OTHER DYSPHAGIA: Primary | ICD-10-CM

## 2019-05-06 DIAGNOSIS — R19.7 DIARRHEA, UNSPECIFIED TYPE: ICD-10-CM

## 2019-05-06 DIAGNOSIS — R13.10 DYSPHAGIA: Primary | ICD-10-CM

## 2019-05-06 PROCEDURE — 99214 OFFICE O/P EST MOD 30 MIN: CPT | Performed by: INTERNAL MEDICINE

## 2019-05-06 RX ORDER — SODIUM CHLORIDE 9 MG/ML
70 INJECTION, SOLUTION INTRAVENOUS CONTINUOUS PRN
Status: CANCELLED | OUTPATIENT
Start: 2019-05-14

## 2019-05-06 NOTE — H&P (VIEW-ONLY)
Chief Complaint   Patient presents with   • Difficulty Swallowing     History of Present Illness     The patient has difficulty swallowing off and for the last 2 years. The symptom is rather severe, occurs on daily basis and is associated mostly with solid foods.  The symptoms are somewhat progressive progressive.  The patient points towards the lower substernal area.  There is no associated weight loss.  In April 2019 the patient presented to [facility] emergency room with esophageal foreign body sensation.  He had undergone an upper endoscopy with removal of the esophageal foreign body.  He was found to have tight esophageal strictures which were serially dilated to 12 mm.  The patient was also found to have prepyloric ulcer.  The patient also has history of esophageal foreign body that required endoscopic intervention.  The patient has history of reflux which has improved.  The patient has history of diarrhea off-and-on for the last 2-3 years.  Severity is mild, frequency of bowel movements being 2-3 times a day.  The stools are described as loose and occasionally watery.  Occasionally, there is no nocturnal element of diarrhea. The diarrhea is not associated with tenesmus.  There is no history of significant consumption of milk or ice cream.  The patient denies taking laxatives, using magnesium based products, drinking herbal tea, senna preparations, aloe vera juice, nutritional supplements, sugar free candies or colon cleansers.  There is no constipation.    He denies abdominal pain.  There is no nausea or vomiting.    There is no overt GI bleed (hematemesis, melena or hematochezia).  He denies history of liver or pancreatic disease.  There is no family history of colon cancer, inflammatory bowel disease or chronic liver disease.       Review of Systems   Constitutional: Negative for appetite change, chills, fatigue, fever and unexpected weight change.   HENT: Negative for mouth sores, nosebleeds  and trouble swallowing.    Eyes: Negative for discharge and redness.   Respiratory: Negative for apnea, cough and shortness of breath.    Cardiovascular: Negative for chest pain, palpitations and leg swelling.   Gastrointestinal: Negative for abdominal distention, abdominal pain, anal bleeding, blood in stool, constipation, diarrhea, nausea and vomiting.   Endocrine: Negative for cold intolerance, heat intolerance and polydipsia.   Genitourinary: Negative for dysuria, hematuria and urgency.   Musculoskeletal: Negative for arthralgias, joint swelling and myalgias.   Skin: Negative for rash.   Allergic/Immunologic: Negative for food allergies and immunocompromised state.   Neurological: Negative for dizziness, seizures, syncope and headaches.   Hematological: Negative for adenopathy. Does not bruise/bleed easily.   Psychiatric/Behavioral: Negative for dysphoric mood. The patient is not nervous/anxious and is not hyperactive.      Patient Active Problem List   Diagnosis   • Gastroesophageal reflux disease with esophagitis   • Chronic gastritis without bleeding   • Dysphagia   • Heartburn     Past Medical History:   Diagnosis Date   • Dysphagia     MOTHER REPORTS PATIENT HAS RECENTLY HAS GOTTEN CHOKED EASILY AND GETS FOOD STUCK IN THROAT.   • GERD (gastroesophageal reflux disease)    • H/O seasonal allergies    • Wears glasses      Past Surgical History:   Procedure Laterality Date   • EAR TUBES     • ENDOSCOPY N/A 7/15/2017    Procedure: ESOPHAGOGASTRODUODENOSCOPY with bx , removal foreign body;  Surgeon: Saroj Scott MD;  Location: [facility] OR;  Service:    • ENDOSCOPY N/A 8/24/2017    Procedure: ESOPHAGOGASTRODUODENOSCOPY ESOPHAGEAL DILATATION;  Surgeon: Saroj Scott MD;  Location: [facility] ENDOSCOPY;  Service:    • ENDOSCOPY N/A 4/9/2019    Procedure: ESOPHAGOGASTRODUODENOSCOPY FOR FOREIGN BODY, biopsy, and esophageal dilitation;  Surgeon: Tyrone Herzog MD;  Location: [facility] ENDOSCOPY;  Service: Gastroenterology  "  • ESOPHAGOSCOPY / EGD       Family History   Problem Relation Age of Onset   • No Known Problems Mother    • No Known Problems Father      Social History     Tobacco Use   • Smoking status: Never Smoker   • Smokeless tobacco: Never Used   Substance Use Topics   • Alcohol use: No       Current Outpatient Medications:   •  loperamide (IMODIUM) 2 MG capsule, Take 2 mg by mouth 4 (Four) Times a Day As Needed for Diarrhea., Disp: , Rfl:   •  pantoprazole (PROTONIX) 40 MG EC tablet, Take 1 tablet by mouth 30 minutes before breakfast daily., Disp: 30 tablet, Rfl: 3    No Known Allergies    Blood pressure 131/93, pulse 90, temperature 98.5 °F (36.9 °C), resp. rate 20, height 180.3 cm (71\"), weight 98.9 kg (218 lb).    Physical Exam   Constitutional: He is oriented to person, place, and time. He appears well-developed and well-nourished. No distress.   HENT:   Head: Normocephalic and atraumatic.   Right Ear: Hearing and external ear normal.   Left Ear: Hearing and external ear normal.   Nose: Nose normal.   Mouth/Throat: Oropharynx is clear and moist and mucous membranes are normal. Mucous membranes are not pale, not dry and not cyanotic. No oral lesions. No oropharyngeal exudate.   Eyes: Conjunctivae and EOM are normal. Right eye exhibits no discharge. Left eye exhibits no discharge. No scleral icterus.   Neck: Trachea normal. Neck supple. No JVD present. No edema present. No thyroid mass and no thyromegaly present.   Cardiovascular: Normal rate, regular rhythm, S2 normal and normal heart sounds. Exam reveals no gallop, no S3 and no friction rub.   No murmur heard.  Pulmonary/Chest: Effort normal and breath sounds normal. No respiratory distress. He has no wheezes. He has no rales. He exhibits no tenderness.   Abdominal: Soft. Normal appearance and bowel sounds are normal. He exhibits no distension, no ascites and no mass. There is no splenomegaly or hepatomegaly. There is no tenderness. There is no rigidity, no rebound " and no guarding. No hernia.   Musculoskeletal: He exhibits no tenderness or deformity.     Vascular Status -  His right foot exhibits no edema. His left foot exhibits no edema.  Lymphadenopathy:     He has no cervical adenopathy.        Left: No supraclavicular adenopathy present.   Neurological: He is alert and oriented to person, place, and time. He has normal strength. No cranial nerve deficit or sensory deficit. He exhibits normal muscle tone. Coordination normal.   Skin: No rash noted. He is not diaphoretic. No cyanosis. No pallor. Nails show no clubbing.   Psychiatric: He has a normal mood and affect. His behavior is normal. Judgment and thought content normal.   Nursing note and vitals reviewed.  Stigmata of chronic liver disease:  None.  Asterixis:  None.    Procedures:  Upon review of medical records:     Dated April 9, 2019 the patient underwent an upper endoscopy which revealed: Esophageal foreign body.  Status post removal.  Tight esophageal concentric rings.  Status post serial dilation to 12 mm.  Erosive distal esophagitis.  LA class A.  Small sliding hiatal hernia less than 3 cm.  Erythematous-erosive gastritis.  10 x 5 mm clean-based prepyloric ulcer.  Changes seen within the esophagus are suggestive of eosinophilic esophagitis.  Stomach biopsy revealed antral mucosa with reactive gastropathy and mild to moderate chronic inflammation.  Negative for Helicobacter pylori by immunohistochemical stain.  Negative for intestinal metaplasia, dysplasia or malignancy.  Esophagus, biopsy revealed squamous mucosa with reactive changes, intraepithelial eosinophils and surface erosion.  Rare fungal organisms morphologically consistent with Candida species identified by PAS special stain.  Negative for glandular mucosa, intestinal metaplasia, dysplasia or malignancy.  Sections of the mid and distal esophageal biopsy show squamous mucosa with marked spongiosis, expansion of the basal layer, and mixed inflammatory  infiltrate with neutrophils and eosinophils (up to 25 intraepithelial eosinophils per high magnification field).  There are focal eosinophilic microabscesses, and the superficial mucosa shows areas of erosion.  A PAS special stain shows rare fungal organisms morphologically consistent with Candida species.  The differential diagnosis for this degree of eosinophilia and histologic changes include eosinophilic esophagitis, proton pump inhibitor therapy response and esophageal eosinophilia, and gastroesophageal reflux.  Clinical and endoscopic correlation is recommended.      Assessment:      ICD-10-CM ICD-9-CM   1. Other dysphagia R13.19 787.29   2. Heartburn R12 787.1   3. Diarrhea, unspecified type R19.7 787.91         Discussion:  1.     Plan/  Patient Instructions   1. The patient should eat relatively soft diet.    2. The patient should eat in upright position and chew well.  The patient should drink water after to 3 bites and take medications with liberal amounts of water.    3. Generally,  medications that have a potential to cause pill esophagitis may be avoided or used in an alternative form. For example Motrin or Aleve can be taken in a gel cap form.  4. After eating and taking medications the patient should remain in upright position for 10-15 minutes.  5. Antireflux measures.  6. Protonix (Pantoprazole) tablet 40 mg tablet. Take 1 tablet orally in the morning half an hour before eating every day.  7. Avoid NSAIDs.  8. Upper endoscopy (EGD) counseling:  Description of the procedure, risks, benefits, alternatives and options including nonoperative options were discussed with the patient in detail.  The patient understands and wishes to proceed.    9. The patient may need a colonoscopy in the future.  10. Discussed with the patient and his mother.         Tyrone Herzog MD

## 2019-05-06 NOTE — PROGRESS NOTES
Chief Complaint   Patient presents with   • Difficulty Swallowing     History of Present Illness     The patient has difficulty swallowing off and for the last 2 years. The symptom is rather severe, occurs on daily basis and is associated mostly with solid foods.  The symptoms are somewhat progressive progressive.  The patient points towards the lower substernal area.  There is no associated weight loss.  In April 2019 the patient presented to [facility] emergency room with esophageal foreign body sensation.  He had undergone an upper endoscopy with removal of the esophageal foreign body.  He was found to have tight esophageal strictures which were serially dilated to 12 mm.  The patient was also found to have prepyloric ulcer.  The patient also has history of esophageal foreign body that required endoscopic intervention.  The patient has history of reflux which has improved.  The patient has history of diarrhea off-and-on for the last 2-3 years.  Severity is mild, frequency of bowel movements being 2-3 times a day.  The stools are described as loose and occasionally watery.  Occasionally, there is no nocturnal element of diarrhea. The diarrhea is not associated with tenesmus.  There is no history of significant consumption of milk or ice cream.  The patient denies taking laxatives, using magnesium based products, drinking herbal tea, senna preparations, aloe vera juice, nutritional supplements, sugar free candies or colon cleansers.  There is no constipation.    He denies abdominal pain.  There is no nausea or vomiting.    There is no overt GI bleed (hematemesis, melena or hematochezia).  He denies history of liver or pancreatic disease.  There is no family history of colon cancer, inflammatory bowel disease or chronic liver disease.       Review of Systems   Constitutional: Negative for appetite change, chills, fatigue, fever and unexpected weight change.   HENT: Negative for mouth sores, nosebleeds  and trouble swallowing.    Eyes: Negative for discharge and redness.   Respiratory: Negative for apnea, cough and shortness of breath.    Cardiovascular: Negative for chest pain, palpitations and leg swelling.   Gastrointestinal: Negative for abdominal distention, abdominal pain, anal bleeding, blood in stool, constipation, diarrhea, nausea and vomiting.   Endocrine: Negative for cold intolerance, heat intolerance and polydipsia.   Genitourinary: Negative for dysuria, hematuria and urgency.   Musculoskeletal: Negative for arthralgias, joint swelling and myalgias.   Skin: Negative for rash.   Allergic/Immunologic: Negative for food allergies and immunocompromised state.   Neurological: Negative for dizziness, seizures, syncope and headaches.   Hematological: Negative for adenopathy. Does not bruise/bleed easily.   Psychiatric/Behavioral: Negative for dysphoric mood. The patient is not nervous/anxious and is not hyperactive.      Patient Active Problem List   Diagnosis   • Gastroesophageal reflux disease with esophagitis   • Chronic gastritis without bleeding   • Dysphagia   • Heartburn     Past Medical History:   Diagnosis Date   • Dysphagia     MOTHER REPORTS PATIENT HAS RECENTLY HAS GOTTEN CHOKED EASILY AND GETS FOOD STUCK IN THROAT.   • GERD (gastroesophageal reflux disease)    • H/O seasonal allergies    • Wears glasses      Past Surgical History:   Procedure Laterality Date   • EAR TUBES     • ENDOSCOPY N/A 7/15/2017    Procedure: ESOPHAGOGASTRODUODENOSCOPY with bx , removal foreign body;  Surgeon: Saroj Scott MD;  Location: [facility] OR;  Service:    • ENDOSCOPY N/A 8/24/2017    Procedure: ESOPHAGOGASTRODUODENOSCOPY ESOPHAGEAL DILATATION;  Surgeon: Saroj Scott MD;  Location: [facility] ENDOSCOPY;  Service:    • ENDOSCOPY N/A 4/9/2019    Procedure: ESOPHAGOGASTRODUODENOSCOPY FOR FOREIGN BODY, biopsy, and esophageal dilitation;  Surgeon: Tyrone Herzog MD;  Location: [facility] ENDOSCOPY;  Service: Gastroenterology  "  • ESOPHAGOSCOPY / EGD       Family History   Problem Relation Age of Onset   • No Known Problems Mother    • No Known Problems Father      Social History     Tobacco Use   • Smoking status: Never Smoker   • Smokeless tobacco: Never Used   Substance Use Topics   • Alcohol use: No       Current Outpatient Medications:   •  loperamide (IMODIUM) 2 MG capsule, Take 2 mg by mouth 4 (Four) Times a Day As Needed for Diarrhea., Disp: , Rfl:   •  pantoprazole (PROTONIX) 40 MG EC tablet, Take 1 tablet by mouth 30 minutes before breakfast daily., Disp: 30 tablet, Rfl: 3    No Known Allergies    Blood pressure 131/93, pulse 90, temperature 98.5 °F (36.9 °C), resp. rate 20, height 180.3 cm (71\"), weight 98.9 kg (218 lb).    Physical Exam   Constitutional: He is oriented to person, place, and time. He appears well-developed and well-nourished. No distress.   HENT:   Head: Normocephalic and atraumatic.   Right Ear: Hearing and external ear normal.   Left Ear: Hearing and external ear normal.   Nose: Nose normal.   Mouth/Throat: Oropharynx is clear and moist and mucous membranes are normal. Mucous membranes are not pale, not dry and not cyanotic. No oral lesions. No oropharyngeal exudate.   Eyes: Conjunctivae and EOM are normal. Right eye exhibits no discharge. Left eye exhibits no discharge. No scleral icterus.   Neck: Trachea normal. Neck supple. No JVD present. No edema present. No thyroid mass and no thyromegaly present.   Cardiovascular: Normal rate, regular rhythm, S2 normal and normal heart sounds. Exam reveals no gallop, no S3 and no friction rub.   No murmur heard.  Pulmonary/Chest: Effort normal and breath sounds normal. No respiratory distress. He has no wheezes. He has no rales. He exhibits no tenderness.   Abdominal: Soft. Normal appearance and bowel sounds are normal. He exhibits no distension, no ascites and no mass. There is no splenomegaly or hepatomegaly. There is no tenderness. There is no rigidity, no rebound " and no guarding. No hernia.   Musculoskeletal: He exhibits no tenderness or deformity.     Vascular Status -  His right foot exhibits no edema. His left foot exhibits no edema.  Lymphadenopathy:     He has no cervical adenopathy.        Left: No supraclavicular adenopathy present.   Neurological: He is alert and oriented to person, place, and time. He has normal strength. No cranial nerve deficit or sensory deficit. He exhibits normal muscle tone. Coordination normal.   Skin: No rash noted. He is not diaphoretic. No cyanosis. No pallor. Nails show no clubbing.   Psychiatric: He has a normal mood and affect. His behavior is normal. Judgment and thought content normal.   Nursing note and vitals reviewed.  Stigmata of chronic liver disease:  None.  Asterixis:  None.    Procedures:  Upon review of medical records:     Dated April 9, 2019 the patient underwent an upper endoscopy which revealed: Esophageal foreign body.  Status post removal.  Tight esophageal concentric rings.  Status post serial dilation to 12 mm.  Erosive distal esophagitis.  LA class A.  Small sliding hiatal hernia less than 3 cm.  Erythematous-erosive gastritis.  10 x 5 mm clean-based prepyloric ulcer.  Changes seen within the esophagus are suggestive of eosinophilic esophagitis.  Stomach biopsy revealed antral mucosa with reactive gastropathy and mild to moderate chronic inflammation.  Negative for Helicobacter pylori by immunohistochemical stain.  Negative for intestinal metaplasia, dysplasia or malignancy.  Esophagus, biopsy revealed squamous mucosa with reactive changes, intraepithelial eosinophils and surface erosion.  Rare fungal organisms morphologically consistent with Candida species identified by PAS special stain.  Negative for glandular mucosa, intestinal metaplasia, dysplasia or malignancy.  Sections of the mid and distal esophageal biopsy show squamous mucosa with marked spongiosis, expansion of the basal layer, and mixed inflammatory  infiltrate with neutrophils and eosinophils (up to 25 intraepithelial eosinophils per high magnification field).  There are focal eosinophilic microabscesses, and the superficial mucosa shows areas of erosion.  A PAS special stain shows rare fungal organisms morphologically consistent with Candida species.  The differential diagnosis for this degree of eosinophilia and histologic changes include eosinophilic esophagitis, proton pump inhibitor therapy response and esophageal eosinophilia, and gastroesophageal reflux.  Clinical and endoscopic correlation is recommended.      Assessment:      ICD-10-CM ICD-9-CM   1. Other dysphagia R13.19 787.29   2. Heartburn R12 787.1   3. Diarrhea, unspecified type R19.7 787.91         Discussion:  1.     Plan/  Patient Instructions   1. The patient should eat relatively soft diet.    2. The patient should eat in upright position and chew well.  The patient should drink water after to 3 bites and take medications with liberal amounts of water.    3. Generally,  medications that have a potential to cause pill esophagitis may be avoided or used in an alternative form. For example Motrin or Aleve can be taken in a gel cap form.  4. After eating and taking medications the patient should remain in upright position for 10-15 minutes.  5. Antireflux measures.  6. Protonix (Pantoprazole) tablet 40 mg tablet. Take 1 tablet orally in the morning half an hour before eating every day.  7. Avoid NSAIDs.  8. Upper endoscopy (EGD) counseling:  Description of the procedure, risks, benefits, alternatives and options including nonoperative options were discussed with the patient in detail.  The patient understands and wishes to proceed.    9. The patient may need a colonoscopy in the future.  10. Discussed with the patient and his mother.         Tyrone Herzog MD

## 2019-05-06 NOTE — PATIENT INSTRUCTIONS
1. The patient should eat relatively soft diet.    2. The patient should eat in upright position and chew well.  The patient should drink water after to 3 bites and take medications with liberal amounts of water.    3. Generally,  medications that have a potential to cause pill esophagitis may be avoided or used in an alternative form. For example Motrin or Aleve can be taken in a gel cap form.  4. After eating and taking medications the patient should remain in upright position for 10-15 minutes.  5. Antireflux measures.  6. Protonix (Pantoprazole) tablet 40 mg tablet. Take 1 tablet orally in the morning half an hour before eating every day.  7. Avoid NSAIDs.  8. Upper endoscopy (EGD) counseling:  Description of the procedure, risks, benefits, alternatives and options including nonoperative options were discussed with the patient in detail.  The patient understands and wishes to proceed.    9. The patient may need a colonoscopy in the future.  10. Discussed with the patient and his mother.

## 2019-05-07 PROBLEM — R13.10 DYSPHAGIA: Status: ACTIVE | Noted: 2019-05-07

## 2019-05-07 PROBLEM — R12 HEARTBURN: Status: ACTIVE | Noted: 2019-05-07

## 2019-05-14 ENCOUNTER — ANESTHESIA (OUTPATIENT)
Dept: GASTROENTEROLOGY | Facility: HOSPITAL | Age: 24
End: 2019-05-14

## 2019-05-14 ENCOUNTER — ANESTHESIA EVENT (OUTPATIENT)
Dept: GASTROENTEROLOGY | Facility: HOSPITAL | Age: 24
End: 2019-05-14

## 2019-05-14 ENCOUNTER — HOSPITAL ENCOUNTER (OUTPATIENT)
Facility: HOSPITAL | Age: 24
Setting detail: HOSPITAL OUTPATIENT SURGERY
Discharge: HOME | End: 2019-05-14
Attending: INTERNAL MEDICINE | Admitting: INTERNAL MEDICINE

## 2019-05-14 VITALS
SYSTOLIC BLOOD PRESSURE: 114 MMHG | TEMPERATURE: 97.6 F | DIASTOLIC BLOOD PRESSURE: 65 MMHG | HEART RATE: 78 BPM | RESPIRATION RATE: 20 BRPM | WEIGHT: 218 LBS | BODY MASS INDEX: 30.52 KG/M2 | HEIGHT: 71 IN | OXYGEN SATURATION: 98 %

## 2019-05-14 DIAGNOSIS — R12 HEARTBURN: ICD-10-CM

## 2019-05-14 DIAGNOSIS — R13.10 DYSPHAGIA: ICD-10-CM

## 2019-05-14 PROCEDURE — 43239 EGD BIOPSY SINGLE/MULTIPLE: CPT | Performed by: INTERNAL MEDICINE

## 2019-05-14 PROCEDURE — 25010000002 PROPOFOL 200 MG/20ML EMULSION: Performed by: NURSE ANESTHETIST, CERTIFIED REGISTERED

## 2019-05-14 PROCEDURE — 25010000002 METOCLOPRAMIDE PER 10 MG: Performed by: NURSE ANESTHETIST, CERTIFIED REGISTERED

## 2019-05-14 RX ORDER — METOCLOPRAMIDE HYDROCHLORIDE 5 MG/ML
INJECTION INTRAMUSCULAR; INTRAVENOUS AS NEEDED
Status: DISCONTINUED | OUTPATIENT
Start: 2019-05-14 | End: 2019-05-14 | Stop reason: SURG

## 2019-05-14 RX ORDER — SODIUM CHLORIDE 0.9 % (FLUSH) 0.9 %
3 SYRINGE (ML) INJECTION AS NEEDED
Status: DISCONTINUED | OUTPATIENT
Start: 2019-05-14 | End: 2019-05-14 | Stop reason: HOSPADM

## 2019-05-14 RX ORDER — PROPOFOL 10 MG/ML
INJECTION, EMULSION INTRAVENOUS AS NEEDED
Status: DISCONTINUED | OUTPATIENT
Start: 2019-05-14 | End: 2019-05-14 | Stop reason: SURG

## 2019-05-14 RX ORDER — MAGNESIUM HYDROXIDE 1200 MG/15ML
LIQUID ORAL AS NEEDED
Status: DISCONTINUED | OUTPATIENT
Start: 2019-05-14 | End: 2019-05-14 | Stop reason: HOSPADM

## 2019-05-14 RX ORDER — SIMETHICONE 20 MG/.3ML
EMULSION ORAL AS NEEDED
Status: DISCONTINUED | OUTPATIENT
Start: 2019-05-14 | End: 2019-05-14 | Stop reason: HOSPADM

## 2019-05-14 RX ORDER — SODIUM CHLORIDE 9 MG/ML
70 INJECTION, SOLUTION INTRAVENOUS CONTINUOUS PRN
Status: DISCONTINUED | OUTPATIENT
Start: 2019-05-14 | End: 2019-05-14 | Stop reason: HOSPADM

## 2019-05-14 RX ORDER — METOCLOPRAMIDE 5 MG/1
2.5 TABLET ORAL
Qty: 30 TABLET | Refills: 1 | Status: SHIPPED | OUTPATIENT
Start: 2019-05-14 | End: 2019-08-22

## 2019-05-14 RX ADMIN — SODIUM CHLORIDE 70 ML/HR: 9 INJECTION, SOLUTION INTRAVENOUS at 07:14

## 2019-05-14 RX ADMIN — METOCLOPRAMIDE 5 MG: 5 INJECTION, SOLUTION INTRAMUSCULAR; INTRAVENOUS at 07:56

## 2019-05-14 RX ADMIN — PROPOFOL 100 MG: 10 INJECTION, EMULSION INTRAVENOUS at 07:40

## 2019-05-14 RX ADMIN — PROPOFOL 100 MG: 10 INJECTION, EMULSION INTRAVENOUS at 07:43

## 2019-05-14 RX ADMIN — SODIUM CHLORIDE: 9 INJECTION, SOLUTION INTRAVENOUS at 07:32

## 2019-05-14 NOTE — ANESTHESIA POSTPROCEDURE EVALUATION
Patient: [place]    Procedure Summary     Date:  05/14/19 Room / Location:  [facility] ENDOSCOPY 2 / [facility] ENDOSCOPY    Anesthesia Start:  0732 Anesthesia Stop:  0749    Procedure:  ESOPHAGOGASTRODUODENOSCOPY (N/A Esophagus) Diagnosis:       Esophagitis      Hiatal hernia      Delayed gastric emptying      Gastritis      (Dysphagia [R13.10])      (Heartburn [R12])    Surgeon:  Tyrone Herzog MD Provider:  Shayan Beecrra CRNA    Anesthesia Type:  MAC ASA Status:  2          Anesthesia Type: MAC  Last vitals  BP   114/65 (05/14/19 0825)   Temp   97.6 °F (36.4 °C) (05/14/19 0755)   Pulse   78 (05/14/19 0825)   Resp   20 (05/14/19 0825)     SpO2   98 % (05/14/19 0825)     Post Anesthesia Care and Evaluation    Patient location during evaluation: bedside  Patient participation: complete - patient participated  Level of consciousness: awake and alert  Pain score: 0  Pain management: satisfactory to patient  Airway patency: patent  Anesthetic complications: No anesthetic complications  PONV Status: none  Cardiovascular status: acceptable and hemodynamically stable  Respiratory status: acceptable  Hydration status: acceptable

## 2019-05-14 NOTE — ANESTHESIA PREPROCEDURE EVALUATION
Anesthesia Evaluation     Patient summary reviewed and Nursing notes reviewed   NPO Solid Status: > 8 hours  NPO Liquid Status: > 8 hours           Airway   Mallampati: II  TM distance: >3 FB  Neck ROM: full  No difficulty expected  Dental - normal exam     Pulmonary - negative pulmonary ROS and normal exam   Cardiovascular - negative cardio ROS and normal exam  Exercise tolerance: excellent (>7 METS)        Neuro/Psych  (+) psychiatric history,     GI/Hepatic/Renal/Endo    (+)  GERD,      Musculoskeletal (-) negative ROS    Abdominal  - normal exam    Bowel sounds: normal.   Substance History - negative use     OB/GYN negative ob/gyn ROS         Other                        Anesthesia Plan    ASA 2     MAC     intravenous induction   Anesthetic plan, all risks, benefits, and alternatives have been provided, discussed and informed consent has been obtained with: patient.    Plan discussed with attending.

## 2019-05-14 NOTE — INTERVAL H&P NOTE
"  H&P reviewed. The patient was examined and there are no changes to the H&P.       No recent shortness of breath or chest pains.      Blood pressure 127/82, pulse 68, temperature 98.5 °F (36.9 °C), temperature source Temporal, resp. rate 18, height 180.3 cm (71\"), weight 98.9 kg (218 lb), SpO2 98 %.    Chest: clear to auscultation.  Cardiac exam: No S3 no murmurs.   Abdomen: soft bowel sounds present nondistended nontender.        "

## 2019-05-14 NOTE — OP NOTE
PROCEDURE:  Upper Endoscopy with biopsies.    DATE OF PROCEDURE: May 14, 2019.    REFERRING PROVIDER:  Silvano Cazares MD.     INSTRUMENT: Olympus GIF H 190 video endoscope     INDICATIONS OF THE PROCEDURE:   This is a 24-year-old white male with history of dysphagia and reflux.  Currently undergoing upper endoscopy for further evaluation and intervention.     BIOPSIES: Gastric antrum.       MEDICATIONS:  MAC.     PHOTOGRAPHS:  Photographs were included in the medical records.     CONSENT/PREPROCEDURE EVALUATION:  Risks, benefits, alternatives and options of the procedure including risks of anesthesia/sedation were discussed and informed consent was obtained prior to the procedure. History and physical examination were performed and nothing precluded the test.     REPORT:  The patient was placed in left lateral decubitus position. Once under the influence of IV sedation, the instrument was inserted into the mouth and esophagus was intubated under direct vision without difficulty.     Esophagus:  Z line was noted to be around 40 cm.  Erythematous distal esophagitis was seen.  Concentric rings were noted within the mid and distal esophagus.  A small sliding hiatal hernia less than 3 cm was noted.  No Liriano's esophagus was seen. Erythematous distal esophagitis was seen.       Stomach:  Significant amount of partly digested food residue was seen in the stomach which precluded a detailed evaluation.  Visualized portions of the stomach revealed:    Antrum:  Erythematous gastritis.  Angulus, lesser and greater curves: Normal.  Retroflex examination: Sliding hiatal hernia.  Cardia and fundus:   Partial within normal limits.    Body of the stomach: Erythematous gastritis.  Good distensibility of the stomach was achieved no giant folds were noted.   Biopsies were obtained from the gastric antrum.     Pylorus and pyloric channel:  normal.     Duodenum:  Bulb: Normal.  Second portion: normal.  No scalloping was seen in  the second portion of duodenum.      Intervention:  None.       The upper GI tract was decompressed and the scope was pulled out of the patient. The patient tolerated the procedure well.     DIAGNOSES:     1. Erythematous distal esophagitis.  2. Erythematous gastritis.    3. Changes consistent with delayed gastric emptying (partly digested food residue within the stomach, patient nothing by mouth for 10 hours, and no gastric outlet obstruction).  4. Small sliding hiatal hernia less than 3 cm.    RECOMMENDATIONS:  1.  Dietary instructions.  2.  Protonix (Pantoprazole) tablet 40 mg tablet. Take 1 tablet orally in the morning half an hour before eating every day.  3.  Follow biopsies.  4.  Follow up in office.  5.  Reglan (metoclopramide) 5 mg tablets.  Take one half tablet (2.5 mg) by mouth in the morning and in the evening (2 times daily preferably half an hour before food).      Thank you very much for letting me participate in the care of this patient. Please do not hesitate to call me if you have any questions.

## 2019-05-14 NOTE — DISCHARGE INSTRUCTIONS
No pushing, pulling, tugging, heavy lifting, or strenuous activity.  No major decision making, driving, or drinking alcoholic beverages for 24 hours. (due to the medications you have received)  Always use good hand hygiene/washing techniques.  NO driving while taking pain medications.    To assist you in voiding:  Drink plenty of fluids  Listen to running water while attempting to void.    If you are unable to urinate and you have an uncomfortable urge to void or it has been   6 hours since you were discharged, return to the Emergency Room    ************************************************************************************************************    Postprocedure instructions.  1. Nothing by mouth for 1 hour.  2. Clear liquids diet (No Sodas) for 1 hours.  3. May advance to soft low-fat diet  in 2 hours       Diet otherwise:    1. Low fat diet The patient should eat smaller meals and softer food in general.    2. Vegetables are best consumed as steamed or mashed.    3. Fruit should be mashed or blenderized.    4. Meat is best consumed as ground.    Other Instructions:  Call [facility] at [phone] or come to the Emergency Department if you experience the following: Chest pain, abdominal pain, bleeding (vomiting of blood or coffee colored material, black stools or chelita blood in stools),   fever/chills, nausea and vomiting or dizziness.    Follow-up:    Dr. Herzog in 3-4 weeks. Office phone #[phone]. If you already have an appointment just keep that appointment.    ************************************************************************************      Notes to the patient and the family from your Doctor    Dear patient/family member,    Findings on today's procedure are as follows:    1. Esophagitis. Inflammation of the esophagus.  2. Esophageal rings.  These were not stretched due to presence of significant amount of food within the stomach.  3. Poor emptying of the stomach. No blockage  of the stomach.  4. Inflammation of the stomach. Gastritis.  5. Small sliding hiatal hernia.      Recommendations:    1. Protonix (Pantoprazole) tablet 40 mg tablet. Take 1 tablet orally in the morning half an hour before eating every day.  2. Reglan (metoclopramide) 5 mg tablets.  Take one half tablet (2.5 mg) by mouth in the morning and in the evening (2 times daily preferably half an hour before food).   3. As above.      Should you have more questions please do not hesitate to talk to the nurse who can call me and let me talk to you.  I hope you feel better.    Tyrone Herzog M.D., FACP, FACG.

## 2019-05-18 LAB
LAB AP CASE REPORT: NORMAL
PATH REPORT.FINAL DX SPEC: NORMAL

## 2019-05-29 LAB
LAB AP CASE REPORT: NORMAL
PATH REPORT.FINAL DX SPEC: NORMAL

## 2019-08-12 RX ORDER — PANTOPRAZOLE SODIUM 40 MG/1
TABLET, DELAYED RELEASE ORAL
Qty: 30 TABLET | Refills: 3 | Status: SHIPPED | OUTPATIENT
Start: 2019-08-12 | End: 2019-08-22 | Stop reason: SDUPTHER

## 2019-08-22 ENCOUNTER — OFFICE VISIT (OUTPATIENT)
Dept: GASTROENTEROLOGY | Facility: CLINIC | Age: 24
End: 2019-08-22

## 2019-08-22 VITALS
SYSTOLIC BLOOD PRESSURE: 129 MMHG | WEIGHT: 219 LBS | HEART RATE: 66 BPM | TEMPERATURE: 98.3 F | HEIGHT: 71 IN | RESPIRATION RATE: 16 BRPM | BODY MASS INDEX: 30.66 KG/M2 | DIASTOLIC BLOOD PRESSURE: 86 MMHG

## 2019-08-22 DIAGNOSIS — R12 HEARTBURN: Chronic | ICD-10-CM

## 2019-08-22 DIAGNOSIS — R19.7 DIARRHEA, UNSPECIFIED TYPE: Chronic | ICD-10-CM

## 2019-08-22 DIAGNOSIS — R13.10 DYSPHAGIA, UNSPECIFIED TYPE: Primary | Chronic | ICD-10-CM

## 2019-08-22 DIAGNOSIS — K30 DELAYED GASTRIC EMPTYING: Chronic | ICD-10-CM

## 2019-08-22 DIAGNOSIS — K20.90 ESOPHAGITIS: ICD-10-CM

## 2019-08-22 DIAGNOSIS — K29.50 CHRONIC GASTRITIS WITHOUT BLEEDING, UNSPECIFIED GASTRITIS TYPE: Chronic | ICD-10-CM

## 2019-08-22 PROCEDURE — 99214 OFFICE O/P EST MOD 30 MIN: CPT | Performed by: NURSE PRACTITIONER

## 2019-08-22 RX ORDER — PANTOPRAZOLE SODIUM 40 MG/1
TABLET, DELAYED RELEASE ORAL
Qty: 90 TABLET | Refills: 1 | Status: SHIPPED | OUTPATIENT
Start: 2019-08-22 | End: 2019-11-21 | Stop reason: SDUPTHER

## 2019-08-22 NOTE — PATIENT INSTRUCTIONS
1. Antireflux measures: Avoid fried, fatty foods, alcohol, chocolate, coffee, tea,  soft drinks, peppermint and spearmint, spicy foods, tomatoes and tomato based foods, onion based foods, and smoking. Other antireflux measures include weight reduction if overweight, avoiding tight clothing around the abdomen, elevating the head of the bed 6 inches with blocks under the head board, and don't drink or eat before going to bed and avoid lying down immediately after meals.  2. Pantoprazole 40 mg 1 tablet by mouth in the am 30 minutes before breakfast.  3. The patient should eat very small meals throughout the day. Avoid large meals.  4. It is recommended to eat a softer diet. Meats are best consumed ground. Fruits and vegetables are best consumed cooked or steamed and then mashed.   5. Possible colonoscopy in the future if diarrhea returns.   6. Follow up: 3 months or sooner if needed    The patient was seen along with his mother. All questions were answered.

## 2019-08-22 NOTE — PROGRESS NOTES
Chief Complaint   Patient presents with   • Follow-up     egd     The patient is here for follow up. He has been doing much better.  There is a history of difficulty swallowing off and for the last 2-3 years. He has had significant improvement with swallowing. The symptom was rather severe, occured on daily basis and was associated mostly with solid foods. Since his EGD, he has been able to eat and drink without any swallowing difficulties.     There is a long standing history of heartburn. Heartburn is well controlled with Pantoprazole. Heartburn can be moderate to severe. Reflux is worst at night. The patient denies abdominal pain, nausea or vomiting. The patient was prescribed Metoclopramide after his EGD. He completed the 30 day supply. He ran out and has not had refills. He does not feel that he needs the Metoclopramide at this time as he is doing well without it.    The patient has history of diarrhea off-and-on for the last 2-3 years. Diarrhea has improved.  Severity is mild, frequency of bowel movements being 2-3 times a day for perhaps a few days once or twice a month or so. He has noticed that if he eats greasy foods, that is when he has diarrhea. The stools are described as loose and occasionally watery. There is no nocturnal element of diarrhea. The diarrhea is not associated with tenesmus.  There is no history of significant consumption of milk or ice cream.  The patient denies taking laxatives, using magnesium based products, drinking herbal tea, senna preparations, aloe vera juice, nutritional supplements, sugar free candies or colon cleansers.  There is no constipation. The patient denies bright red blood per rectum or melena.     Difficulty Swallowing   This is a chronic problem. Episode onset: 2016. The problem occurs rarely. The problem has been rapidly improving. Pertinent negatives include no abdominal pain, arthralgias, chest pain, chills, coughing, fatigue, fever, headaches, joint swelling,  myalgias, nausea, rash or vomiting. The symptoms are aggravated by eating. Treatments tried: EGD and Pantoprazole. The treatment provided significant relief.   Heartburn   He complains of heartburn. He reports no abdominal pain, no chest pain, no coughing or no nausea. This is a chronic problem. Episode onset: 2016. The problem occurs occasionally. The problem has been rapidly improving. The heartburn duration is an hour. The heartburn is located in the substernum. The heartburn is of moderate intensity. The heartburn wakes him from sleep. Nothing aggravates the symptoms. Pertinent negatives include no fatigue. There are no known risk factors. He has tried a PPI for the symptoms. The treatment provided significant relief. Past procedures include an EGD (2019).   Diarrhea    This is a chronic problem. Episode onset: 2016. Episode frequency: 2-3 times per day, once or twice per month. The problem has been waxing and waning. Diarrhea characteristics: loose and occasionally watery. The patient states that diarrhea does not awaken him from sleep. Pertinent negatives include no abdominal pain, arthralgias, chills, coughing, fever, headaches, myalgias or vomiting. Exacerbated by: greasy food. There are no known risk factors. He has tried nothing for the symptoms.     Review of Systems   Constitutional: Negative for appetite change, chills, fatigue, fever and unexpected weight change.   HENT: Negative for mouth sores, nosebleeds and trouble swallowing.    Eyes: Negative for discharge and redness.   Respiratory: Negative for apnea, cough and shortness of breath.    Cardiovascular: Negative for chest pain, palpitations and leg swelling.   Gastrointestinal: Positive for heartburn. Negative for abdominal distention, abdominal pain, anal bleeding, blood in stool, constipation, diarrhea, nausea and vomiting.   Endocrine: Negative for cold intolerance, heat intolerance and polydipsia.   Genitourinary: Negative for dysuria,  hematuria and urgency.   Musculoskeletal: Negative for arthralgias, joint swelling and myalgias.   Skin: Negative for rash.   Allergic/Immunologic: Negative for food allergies and immunocompromised state.   Neurological: Negative for dizziness, seizures, syncope and headaches.   Hematological: Negative for adenopathy. Does not bruise/bleed easily.   Psychiatric/Behavioral: Negative for dysphoric mood. The patient is not nervous/anxious and is not hyperactive.      Patient Active Problem List   Diagnosis   • Gastroesophageal reflux disease with esophagitis   • Chronic gastritis without bleeding   • Dysphagia   • Heartburn   • Diarrhea   • Delayed gastric emptying   • Esophagitis     Past Medical History:   Diagnosis Date   • Asperger syndrome    • Diarrhea    • Dysphagia     MOTHER REPORTS PATIENT HAS RECENTLY HAS GOTTEN CHOKED EASILY AND GETS FOOD STUCK IN THROAT.   • GERD (gastroesophageal reflux disease)    • H/O seasonal allergies    • Hearing loss     Patient's mother reported more loss on the left side but that patient has loss bilateral.  No use of hearing devices.    • Wears glasses      Past Surgical History:   Procedure Laterality Date   • EAR TUBES     • ENDOSCOPY N/A 7/15/2017    Procedure: ESOPHAGOGASTRODUODENOSCOPY with bx , removal foreign body;  Surgeon: Saroj Scott MD;  Location: [facility] OR;  Service:    • ENDOSCOPY N/A 8/24/2017    Procedure: ESOPHAGOGASTRODUODENOSCOPY ESOPHAGEAL DILATATION;  Surgeon: Saroj Scott MD;  Location: [facility] ENDOSCOPY;  Service:    • ENDOSCOPY N/A 4/9/2019    Procedure: ESOPHAGOGASTRODUODENOSCOPY FOR FOREIGN BODY, biopsy, and esophageal dilitation;  Surgeon: Tyrone Herzog MD;  Location: [facility] ENDOSCOPY;  Service: Gastroenterology   • ENDOSCOPY N/A 5/14/2019    Procedure: ESOPHAGOGASTRODUODENOSCOPY with biopsy;  Surgeon: Tyrone Herzog MD;  Location: [facility] ENDOSCOPY;  Service: Gastroenterology   • ESOPHAGOSCOPY / EGD     • UPPER GASTROINTESTINAL ENDOSCOPY   "04/09/2019   • UPPER GASTROINTESTINAL ENDOSCOPY  05/14/2019     Family History   Problem Relation Age of Onset   • No Known Problems Mother    • No Known Problems Father      Social History     Tobacco Use   • Smoking status: Never Smoker   • Smokeless tobacco: Never Used   Substance Use Topics   • Alcohol use: No       Current Outpatient Medications:   •  loperamide (IMODIUM) 2 MG capsule, Take 2 mg by mouth 4 (Four) Times a Day As Needed for Diarrhea., Disp: , Rfl:   •  pantoprazole (PROTONIX) 40 MG EC tablet, take 1 tablet by mouth once daily 30 MINUTES BEFORE BREAKFAST, Disp: 90 tablet, Rfl: 1    No Known Allergies    /86   Pulse 66   Temp 98.3 °F (36.8 °C)   Resp 16   Ht 180.3 cm (71\")   Wt 99.3 kg (219 lb)   BMI 30.54 kg/m²     Physical Exam   Constitutional: He is oriented to person, place, and time. He appears well-developed and well-nourished. No distress.   HENT:   Head: Normocephalic and atraumatic.   Right Ear: Hearing and external ear normal.   Left Ear: Hearing and external ear normal.   Nose: Nose normal.   Mouth/Throat: Oropharynx is clear and moist and mucous membranes are normal. Mucous membranes are not pale, not dry and not cyanotic. No oral lesions. No oropharyngeal exudate.   Eyes: Conjunctivae and EOM are normal. Right eye exhibits no discharge. Left eye exhibits no discharge.   Neck: Trachea normal. Neck supple. No JVD present. No edema present. No thyroid mass and no thyromegaly present.   Cardiovascular: Normal rate, regular rhythm, S2 normal and normal heart sounds. Exam reveals no gallop, no S3 and no friction rub.   No murmur heard.  Pulmonary/Chest: Effort normal and breath sounds normal. No respiratory distress. He exhibits no tenderness.   Abdominal: Normal appearance and bowel sounds are normal. He exhibits no distension, no ascites and no mass. There is no splenomegaly or hepatomegaly. There is no tenderness. There is no rigidity, no rebound and no guarding. No hernia. "     Vascular Status -  His right foot exhibits no edema. His left foot exhibits no edema.  Lymphadenopathy:     He has no cervical adenopathy.        Left: No supraclavicular adenopathy present.   Neurological: He is alert and oriented to person, place, and time. He has normal strength. No cranial nerve deficit or sensory deficit.   Skin: No rash noted. He is not diaphoretic. No cyanosis. No pallor. Nails show no clubbing.   Psychiatric: He has a normal mood and affect.   Nursing note and vitals reviewed.  Stigmata of chronic liver disease:  None.  Asterixis:  None.    Procedures:  Upon review of records:    Dated April 9, 2019 the patient underwent an upper endoscopy which revealed: Esophageal foreign body.  Status post removal.  Tight esophageal concentric rings.  Status post serial dilation to 12 mm.  Erosive distal esophagitis.  LA class A.  Small sliding hiatal hernia less than 3 cm.  Erythematous-erosive gastritis.  10 x 5 mm clean-based prepyloric ulcer.  Changes seen within the esophagus are suggestive of eosinophilic esophagitis.  Stomach biopsy revealed antral mucosa with reactive gastropathy and mild to moderate chronic inflammation.  Negative for Helicobacter pylori by immunohistochemical stain.  Negative for intestinal metaplasia, dysplasia or malignancy.  Esophagus, biopsy revealed squamous mucosa with reactive changes, intraepithelial eosinophils and surface erosion.  Rare fungal organisms morphologically consistent with Candida species identified by PAS special stain.  Negative for glandular mucosa, intestinal metaplasia, dysplasia or malignancy.  Sections of the mid and distal esophageal biopsy show squamous mucosa with marked spongiosis, expansion of the basal layer, and mixed inflammatory infiltrate with neutrophils and eosinophils (up to 25 intraepithelial eosinophils per high magnification field).  There are focal eosinophilic microabscesses, and the superficial mucosa shows areas of erosion.  A  PAS special stain shows rare fungal organisms morphologically consistent with Candida species.  The differential diagnosis for this degree of eosinophilia and histologic changes include eosinophilic esophagitis, proton pump inhibitor therapy response and esophageal eosinophilia, and gastroesophageal reflux.  Clinical and endoscopic correlation is recommended.    EGD dated 5/14/2019 reveals erythematous distal esophagitis.  Erythematous gastritis.  Changes consistent with delayed gastric emptying; partly digested food residue within stomach, patient nothing by mouth for 10 hours, and no gastric outlet obstruction.  Small sliding hiatal hernia less than 3 cm.  Antrum biopsy reveals mild reactive gastropathy with focal superficial vascular congestion.  Negative for H. pylori, intestinal metaplasia, dysplasia and malignancy.    Assessment:      ICD-10-CM ICD-9-CM   1. Dysphagia, unspecified type R13.10 787.20   2. Heartburn R12 787.1   3. Diarrhea, unspecified type R19.7 787.91   4. Delayed gastric emptying K30 536.8   5. Esophagitis K20.9 530.10   6. Chronic gastritis without bleeding, unspecified gastritis type K29.50 535.10     Discussion:  1. Swallowing significantly improved.  2. Heartburn well controlled.  No Liriano's.  3. History of intermittent diarrhea.  Significantly improved.  Often associated with eating greasy foods.  4. Changes consistent with delayed gastric emptying per EGD.  5. Biopsies negative for H. pylori.    Plan/  Patient Instructions   1. Antireflux measures: Avoid fried, fatty foods, alcohol, chocolate, coffee, tea,  soft drinks, peppermint and spearmint, spicy foods, tomatoes and tomato based foods, onion based foods, and smoking. Other antireflux measures include weight reduction if overweight, avoiding tight clothing around the abdomen, elevating the head of the bed 6 inches with blocks under the head board, and don't drink or eat before going to bed and avoid lying down immediately after  meals.  2. Pantoprazole 40 mg 1 tablet by mouth in the am 30 minutes before breakfast.  3. The patient should eat very small meals throughout the day. Avoid large meals.  4. It is recommended to eat a softer diet. Meats are best consumed ground. Fruits and vegetables are best consumed cooked or steamed and then mashed.   5. Possible colonoscopy in the future if diarrhea returns.   6. Follow up: 3 months or sooner if needed    The patient was seen along with his mother. All questions were answered.     SHARATH Vera

## 2019-11-21 ENCOUNTER — OFFICE VISIT (OUTPATIENT)
Dept: GASTROENTEROLOGY | Facility: CLINIC | Age: 24
End: 2019-11-21

## 2019-11-21 VITALS
TEMPERATURE: 98 F | HEART RATE: 62 BPM | SYSTOLIC BLOOD PRESSURE: 126 MMHG | BODY MASS INDEX: 30.66 KG/M2 | WEIGHT: 219 LBS | HEIGHT: 71 IN | RESPIRATION RATE: 16 BRPM | DIASTOLIC BLOOD PRESSURE: 82 MMHG

## 2019-11-21 DIAGNOSIS — R12 HEARTBURN: Chronic | ICD-10-CM

## 2019-11-21 DIAGNOSIS — R10.13 EPIGASTRIC PAIN: Primary | Chronic | ICD-10-CM

## 2019-11-21 DIAGNOSIS — K30 DELAYED GASTRIC EMPTYING: Chronic | ICD-10-CM

## 2019-11-21 DIAGNOSIS — R19.7 DIARRHEA, UNSPECIFIED TYPE: Chronic | ICD-10-CM

## 2019-11-21 PROBLEM — R13.10 DYSPHAGIA: Status: RESOLVED | Noted: 2019-05-07 | Resolved: 2019-11-21

## 2019-11-21 PROCEDURE — 99214 OFFICE O/P EST MOD 30 MIN: CPT | Performed by: NURSE PRACTITIONER

## 2019-11-21 RX ORDER — PANTOPRAZOLE SODIUM 40 MG/1
TABLET, DELAYED RELEASE ORAL
Qty: 30 TABLET | Refills: 5 | Status: SHIPPED | OUTPATIENT
Start: 2019-11-21 | End: 2020-03-03 | Stop reason: SDUPTHER

## 2019-11-21 RX ORDER — METOCLOPRAMIDE 5 MG/1
TABLET ORAL
Qty: 30 TABLET | Refills: 1 | Status: SHIPPED | OUTPATIENT
Start: 2019-11-21 | End: 2020-03-03 | Stop reason: SDUPTHER

## 2019-11-21 NOTE — PROGRESS NOTES
Chief Complaint   Patient presents with   • Follow-up   • Abdominal Pain   • Med Refill     The patient is here for follow up. There is a long standing history of heartburn. Heartburn is well controlled with Pantoprazole. Heartburn is moderate to severe. Reflux is worse at night. The patient had better control of heartburn when he had taken Metoclopramide. He has been out of it for a few months as he did not think he needed it, but since heartburn increased, he would like to try it again. The patient did not have side effects with metoclopramide, no increased diarrhea, tremors or abnormal movements of the face or tongue. The patient has not had further episodes of difficulty swallowing.     There is a history of epigastric pain for the past few months. The patient may have the pain 1-2 times per week. The pain is described as moderate and cramping. Eating does not affect the pain. The patient denies nausea or vomiting.      The patient has a history of diarrhea off and on for the past 3 years or so. Diarrhea has improved. The patient may have diarrhea 1-2 times per month with 1-2 bowel movements per day. Eating greasy food seems to cause diarrhea. Stools are described as loose. There is no history of waking at night due to diarrhea. The patient denies tenesmus. The patient does not eat a significant amount of dairy, and does not eat sugar free candies. The patient may take Imodium once per month with reasonable control of diarrhea. The patient denies bright red blood per rectum or melena.     The patient has not had a colonoscopy in the past. There is no family history of colon cancer.    Abdominal Pain   This is a chronic problem. The current episode started more than 1 year ago. The onset quality is gradual. The problem occurs intermittently. The problem has been waxing and waning. The pain is located in the epigastric region. The pain is moderate. The quality of the pain is cramping. The abdominal pain does not  radiate. Pertinent negatives include no arthralgias, constipation, diarrhea, dysuria, fever, headaches, hematuria, myalgias, nausea or vomiting. Nothing aggravates the pain. The pain is relieved by nothing. He has tried proton pump inhibitors for the symptoms. The treatment provided moderate relief. Prior diagnostic workup includes upper endoscopy. His past medical history is significant for GERD.   Heartburn   He complains of abdominal pain and heartburn. He reports no chest pain, no coughing or no nausea. This is a chronic problem. Episode onset: 2016. The problem occurs occasionally. The problem has been unchanged. The heartburn duration is an hour. The heartburn is located in the substernum. The heartburn is of moderate intensity. The heartburn wakes him from sleep. Nothing aggravates the symptoms. Pertinent negatives include no fatigue. There are no known risk factors. He has tried a PPI for the symptoms. The treatment provided significant relief. Past procedures include an EGD (2019).   Diarrhea    This is a chronic problem. Episode onset: 2016. Episode frequency: 2-3 times per day, once or twice per month. The problem has been waxing and waning. Diarrhea characteristics: loose and occasionally watery. The patient states that diarrhea does not awaken him from sleep. Associated symptoms include abdominal pain. Pertinent negatives include no arthralgias, chills, coughing, fever, headaches, myalgias or vomiting. Exacerbated by: greasy food. There are no known risk factors. He has tried anti-motility drug for the symptoms. The treatment provided significant relief.     Review of Systems   Constitutional: Negative for appetite change, chills, fatigue, fever and unexpected weight change.   HENT: Negative for mouth sores, nosebleeds and trouble swallowing.    Eyes: Negative for discharge and redness.   Respiratory: Negative for apnea, cough and shortness of breath.    Cardiovascular: Negative for chest pain,  palpitations and leg swelling.   Gastrointestinal: Positive for abdominal pain and heartburn. Negative for abdominal distention, anal bleeding, blood in stool, constipation, diarrhea, nausea and vomiting.   Endocrine: Negative for cold intolerance, heat intolerance and polydipsia.   Genitourinary: Negative for dysuria, hematuria and urgency.   Musculoskeletal: Negative for arthralgias, joint swelling and myalgias.   Skin: Negative for rash.   Allergic/Immunologic: Negative for food allergies and immunocompromised state.   Neurological: Negative for dizziness, seizures, syncope and headaches.   Hematological: Negative for adenopathy. Does not bruise/bleed easily.   Psychiatric/Behavioral: Negative for dysphoric mood. The patient is not nervous/anxious and is not hyperactive.      Patient Active Problem List   Diagnosis   • Gastroesophageal reflux disease with esophagitis   • Chronic gastritis without bleeding   • Heartburn   • Diarrhea   • Delayed gastric emptying   • Esophagitis   • Epigastric pain     Past Medical History:   Diagnosis Date   • Asperger syndrome    • Diarrhea    • Dysphagia     MOTHER REPORTS PATIENT HAS RECENTLY HAS GOTTEN CHOKED EASILY AND GETS FOOD STUCK IN THROAT.   • GERD (gastroesophageal reflux disease)    • H/O seasonal allergies    • Hearing loss     Patient's mother reported more loss on the left side but that patient has loss bilateral.  No use of hearing devices.    • Wears glasses      Past Surgical History:   Procedure Laterality Date   • EAR TUBES     • ENDOSCOPY N/A 7/15/2017    Procedure: ESOPHAGOGASTRODUODENOSCOPY with bx , removal foreign body;  Surgeon: Saroj Scott MD;  Location: [facility] OR;  Service:    • ENDOSCOPY N/A 8/24/2017    Procedure: ESOPHAGOGASTRODUODENOSCOPY ESOPHAGEAL DILATATION;  Surgeon: Saroj Scott MD;  Location: [facility] ENDOSCOPY;  Service:    • ENDOSCOPY N/A 4/9/2019    Procedure: ESOPHAGOGASTRODUODENOSCOPY FOR FOREIGN BODY, biopsy, and esophageal  "dilitation;  Surgeon: Tyrone Herzog MD;  Location: [facility] ENDOSCOPY;  Service: Gastroenterology   • ENDOSCOPY N/A 5/14/2019    Procedure: ESOPHAGOGASTRODUODENOSCOPY with biopsy;  Surgeon: Tyrone Herzog MD;  Location: [facility] ENDOSCOPY;  Service: Gastroenterology   • ESOPHAGOSCOPY / EGD     • UPPER GASTROINTESTINAL ENDOSCOPY  04/09/2019   • UPPER GASTROINTESTINAL ENDOSCOPY  05/14/2019     Family History   Problem Relation Age of Onset   • No Known Problems Mother    • No Known Problems Father    • Colon cancer Neg Hx      Social History     Tobacco Use   • Smoking status: Never Smoker   • Smokeless tobacco: Never Used   Substance Use Topics   • Alcohol use: No       Current Outpatient Medications:   •  loperamide (IMODIUM) 2 MG capsule, Take 2 mg by mouth 4 (Four) Times a Day As Needed for Diarrhea., Disp: , Rfl:   •  pantoprazole (PROTONIX) 40 MG EC tablet, take 1 tablet by mouth once daily 30 MINUTES BEFORE BREAKFAST, Disp: 30 tablet, Rfl: 5  •  metoclopramide (REGLAN) 5 MG tablet, 1/2 tablet twice a day, preferably 30 minutes before meals, Disp: 30 tablet, Rfl: 1    No Known Allergies  /82   Pulse 62   Temp 98 °F (36.7 °C)   Resp 16   Ht 180.3 cm (71\")   Wt 99.3 kg (219 lb)   BMI 30.54 kg/m²     Physical Exam   Constitutional: He is oriented to person, place, and time. He appears well-developed and well-nourished. No distress.   HENT:   Head: Normocephalic and atraumatic.   Right Ear: Hearing and external ear normal.   Left Ear: Hearing and external ear normal.   Nose: Nose normal.   Mouth/Throat: Oropharynx is clear and moist and mucous membranes are normal. Mucous membranes are not pale, not dry and not cyanotic. Abnormal dentition. No oropharyngeal exudate.   Eyes: Conjunctivae and EOM are normal. Right eye exhibits no discharge. Left eye exhibits no discharge.   Neck: Trachea normal. Neck supple. No JVD present. No edema present. No thyroid mass and no thyromegaly present.   Cardiovascular: " Normal rate, regular rhythm, S2 normal and normal heart sounds. Exam reveals no gallop, no S3 and no friction rub.   No murmur heard.  Pulmonary/Chest: Effort normal and breath sounds normal. No respiratory distress. He exhibits no tenderness.   Abdominal: Normal appearance and bowel sounds are normal. He exhibits no distension, no ascites and no mass. There is no splenomegaly or hepatomegaly. There is no tenderness. There is no rigidity, no rebound and no guarding. No hernia.     Vascular Status -  His right foot exhibits no edema. His left foot exhibits no edema.  Lymphadenopathy:     He has no cervical adenopathy.        Left: No supraclavicular adenopathy present.   Neurological: He is alert and oriented to person, place, and time. He has normal strength. No cranial nerve deficit or sensory deficit.   Skin: No rash noted. He is not diaphoretic. No cyanosis. No pallor. Nails show no clubbing.   Psychiatric: He has a normal mood and affect.   Nursing note and vitals reviewed.  Stigmata of chronic liver disease:  None.  Asterixis:  None.    Procedures:  Upon review of records:    Dated April 9, 2019 the patient underwent an upper endoscopy which revealed: Esophageal foreign body.  Status post removal.  Tight esophageal concentric rings.  Status post serial dilation to 12 mm.  Erosive distal esophagitis.  LA class A.  Small sliding hiatal hernia less than 3 cm.  Erythematous-erosive gastritis.  10 x 5 mm clean-based prepyloric ulcer.  Changes seen within the esophagus are suggestive of eosinophilic esophagitis.  Stomach biopsy revealed antral mucosa with reactive gastropathy and mild to moderate chronic inflammation.  Negative for Helicobacter pylori by immunohistochemical stain.  Negative for intestinal metaplasia, dysplasia or malignancy.  Esophagus, biopsy revealed squamous mucosa with reactive changes, intraepithelial eosinophils and surface erosion.  Rare fungal organisms morphologically consistent with Candida  species identified by PAS special stain.  Negative for glandular mucosa, intestinal metaplasia, dysplasia or malignancy.  Sections of the mid and distal esophageal biopsy show squamous mucosa with marked spongiosis, expansion of the basal layer, and mixed inflammatory infiltrate with neutrophils and eosinophils (up to 25 intraepithelial eosinophils per high magnification field).  There are focal eosinophilic microabscesses, and the superficial mucosa shows areas of erosion.  A PAS special stain shows rare fungal organisms morphologically consistent with Candida species.  The differential diagnosis for this degree of eosinophilia and histologic changes include eosinophilic esophagitis, proton pump inhibitor therapy response and esophageal eosinophilia, and gastroesophageal reflux.  Clinical and endoscopic correlation is recommended.     EGD dated 5/14/2019 reveals erythematous distal esophagitis.  Erythematous gastritis.  Changes consistent with delayed gastric emptying; partly digested food residue within stomach, patient nothing by mouth for 10 hours, and no gastric outlet obstruction.  Small sliding hiatal hernia less than 3 cm.  Antrum biopsy reveals mild reactive gastropathy with focal superficial vascular congestion.  Negative for H. pylori, intestinal metaplasia, dysplasia and malignancy.    Assessment:      ICD-10-CM ICD-9-CM   1. Epigastric pain R10.13 789.06   2. Heartburn R12 787.1   3. Delayed gastric emptying K30 536.8   4. Diarrhea, unspecified type R19.7 787.91     Plan/  Patient Instructions   1. Antireflux measures: Avoid fried, fatty foods, alcohol, chocolate, coffee, tea,  soft drinks, peppermint and spearmint, spicy foods, tomatoes and tomato based foods, onion based foods, and smoking. Other antireflux measures include weight reduction if overweight, avoiding tight clothing around the abdomen, elevating the head of the bed 6 inches with blocks under the head board, and don't drink or eat before  going to bed and avoid lying down immediately after meals.  2. Pantoprazole 40 mg 1 by mouth in the am 30 minutes before breakfast.  3. Metoclopramide 5 mg 1/2 tablet by mouth twice a day, preferably 30 minutes before meals. Discussed short and long term side effects. The patient verbalizes understanding.   4. The patient should eat very small meals throughout the day. Avoid large meals.  5. It is recommended to eat a softer diet. Meats are best consumed ground. Fruits and vegetables are best consumed cooked or steamed and then mashed.   6. CBC, CMP, TSH, IgA, tTg IgA  7. Abdominal ultrasound.   8. The patient may call for results.   9. Follow up: 5-6 weeks    The patient was seen along with his mother. All questions were answered.     SHARATH Vera

## 2019-11-21 NOTE — PATIENT INSTRUCTIONS
1. Antireflux measures: Avoid fried, fatty foods, alcohol, chocolate, coffee, tea,  soft drinks, peppermint and spearmint, spicy foods, tomatoes and tomato based foods, onion based foods, and smoking. Other antireflux measures include weight reduction if overweight, avoiding tight clothing around the abdomen, elevating the head of the bed 6 inches with blocks under the head board, and don't drink or eat before going to bed and avoid lying down immediately after meals.  2. Pantoprazole 40 mg 1 by mouth in the am 30 minutes before breakfast.  3. Metoclopramide 5 mg 1/2 tablet by mouth twice a day, preferably 30 minutes before meals. Discussed short and long term side effects. The patient verbalizes understanding.   4. The patient should eat very small meals throughout the day. Avoid large meals.  5. It is recommended to eat a softer diet. Meats are best consumed ground. Fruits and vegetables are best consumed cooked or steamed and then mashed.   6. CBC, CMP, TSH, IgA, tTg IgA  7. Abdominal ultrasound.   8. The patient may call for results.   9. Follow up: 5-6 weeks    The patient was seen along with his mother. All questions were answered.

## 2019-12-06 ENCOUNTER — APPOINTMENT (OUTPATIENT)
Dept: ULTRASOUND IMAGING | Facility: HOSPITAL | Age: 24
End: 2019-12-06

## 2019-12-10 ENCOUNTER — LAB (OUTPATIENT)
Dept: LAB | Facility: HOSPITAL | Age: 24
End: 2019-12-10

## 2019-12-10 ENCOUNTER — HOSPITAL ENCOUNTER (OUTPATIENT)
Dept: ULTRASOUND IMAGING | Facility: HOSPITAL | Age: 24
Discharge: HOME | End: 2019-12-10
Admitting: NURSE PRACTITIONER

## 2019-12-10 DIAGNOSIS — R19.7 DIARRHEA, UNSPECIFIED TYPE: Chronic | ICD-10-CM

## 2019-12-10 DIAGNOSIS — R10.13 EPIGASTRIC PAIN: ICD-10-CM

## 2019-12-10 DIAGNOSIS — R12 HEARTBURN: ICD-10-CM

## 2019-12-10 DIAGNOSIS — K30 DELAYED GASTRIC EMPTYING: ICD-10-CM

## 2019-12-10 DIAGNOSIS — K30 DELAYED GASTRIC EMPTYING: Chronic | ICD-10-CM

## 2019-12-10 DIAGNOSIS — R19.7 DIARRHEA, UNSPECIFIED TYPE: ICD-10-CM

## 2019-12-10 DIAGNOSIS — R12 HEARTBURN: Chronic | ICD-10-CM

## 2019-12-10 DIAGNOSIS — R10.13 EPIGASTRIC PAIN: Chronic | ICD-10-CM

## 2019-12-10 LAB
ALBUMIN SERPL-MCNC: 4.5 G/DL (ref 3.5–5.2)
ALBUMIN/GLOB SERPL: 1.5 G/DL
ALP SERPL-CCNC: 81 U/L (ref 39–117)
ALT SERPL W P-5'-P-CCNC: 17 U/L (ref 1–41)
ANION GAP SERPL CALCULATED.3IONS-SCNC: 12.1 MMOL/L (ref 5–15)
AST SERPL-CCNC: 12 U/L (ref 1–40)
BASOPHILS # BLD AUTO: 0.07 10*3/MM3 (ref 0–0.2)
BASOPHILS NFR BLD AUTO: 0.8 % (ref 0–1.5)
BILIRUB SERPL-MCNC: 0.4 MG/DL (ref 0.2–1.2)
BUN BLD-MCNC: 10 MG/DL (ref 6–20)
BUN/CREAT SERPL: 9.3 (ref 7–25)
CALCIUM SPEC-SCNC: 9.3 MG/DL (ref 8.6–10.5)
CHLORIDE SERPL-SCNC: 102 MMOL/L (ref 98–107)
CO2 SERPL-SCNC: 26.9 MMOL/L (ref 22–29)
CREAT BLD-MCNC: 1.07 MG/DL (ref 0.76–1.27)
DEPRECATED RDW RBC AUTO: 41.1 FL (ref 37–54)
EOSINOPHIL # BLD AUTO: 1.92 10*3/MM3 (ref 0–0.4)
EOSINOPHIL NFR BLD AUTO: 22 % (ref 0.3–6.2)
ERYTHROCYTE [DISTWIDTH] IN BLOOD BY AUTOMATED COUNT: 12.1 % (ref 12.3–15.4)
GFR SERPL CREATININE-BSD FRML MDRD: 84 ML/MIN/1.73
GLOBULIN UR ELPH-MCNC: 3.1 GM/DL
GLUCOSE BLD-MCNC: 98 MG/DL (ref 65–99)
HCT VFR BLD AUTO: 47.1 % (ref 37.5–51)
HGB BLD-MCNC: 16 G/DL (ref 13–17.7)
IGA1 MFR SER: 262 MG/DL (ref 70–400)
IMM GRANULOCYTES # BLD AUTO: 0.03 10*3/MM3 (ref 0–0.05)
IMM GRANULOCYTES NFR BLD AUTO: 0.3 % (ref 0–0.5)
LYMPHOCYTES # BLD AUTO: 2.54 10*3/MM3 (ref 0.7–3.1)
LYMPHOCYTES NFR BLD AUTO: 29.2 % (ref 19.6–45.3)
MCH RBC QN AUTO: 31.3 PG (ref 26.6–33)
MCHC RBC AUTO-ENTMCNC: 34 G/DL (ref 31.5–35.7)
MCV RBC AUTO: 92.2 FL (ref 79–97)
MONOCYTES # BLD AUTO: 0.6 10*3/MM3 (ref 0.1–0.9)
MONOCYTES NFR BLD AUTO: 6.9 % (ref 5–12)
NEUTROPHILS # BLD AUTO: 3.55 10*3/MM3 (ref 1.7–7)
NEUTROPHILS NFR BLD AUTO: 40.8 % (ref 42.7–76)
NRBC BLD AUTO-RTO: 0 /100 WBC (ref 0–0.2)
PLATELET # BLD AUTO: 252 10*3/MM3 (ref 140–450)
PMV BLD AUTO: 12.3 FL (ref 6–12)
POTASSIUM BLD-SCNC: 4 MMOL/L (ref 3.5–5.2)
PROT SERPL-MCNC: 7.6 G/DL (ref 6–8.5)
RBC # BLD AUTO: 5.11 10*6/MM3 (ref 4.14–5.8)
SODIUM BLD-SCNC: 141 MMOL/L (ref 136–145)
TSH SERPL DL<=0.05 MIU/L-ACNC: 2.81 UIU/ML (ref 0.27–4.2)
WBC NRBC COR # BLD: 8.71 10*3/MM3 (ref 3.4–10.8)

## 2019-12-10 PROCEDURE — 76705 ECHO EXAM OF ABDOMEN: CPT

## 2019-12-10 PROCEDURE — 82784 ASSAY IGA/IGD/IGG/IGM EACH: CPT

## 2019-12-10 PROCEDURE — 83516 IMMUNOASSAY NONANTIBODY: CPT

## 2019-12-10 PROCEDURE — 80053 COMPREHEN METABOLIC PANEL: CPT

## 2019-12-10 PROCEDURE — 85025 COMPLETE CBC W/AUTO DIFF WBC: CPT

## 2019-12-10 PROCEDURE — 36415 COLL VENOUS BLD VENIPUNCTURE: CPT

## 2019-12-10 PROCEDURE — 84443 ASSAY THYROID STIM HORMONE: CPT

## 2019-12-11 LAB — TTG IGA SER-ACNC: <2 U/ML (ref 0–3)

## 2020-03-03 ENCOUNTER — OFFICE VISIT (OUTPATIENT)
Dept: GASTROENTEROLOGY | Facility: CLINIC | Age: 25
End: 2020-03-03

## 2020-03-03 VITALS
RESPIRATION RATE: 16 BRPM | HEART RATE: 72 BPM | WEIGHT: 221 LBS | BODY MASS INDEX: 30.94 KG/M2 | HEIGHT: 71 IN | DIASTOLIC BLOOD PRESSURE: 82 MMHG | SYSTOLIC BLOOD PRESSURE: 124 MMHG | TEMPERATURE: 97.8 F

## 2020-03-03 DIAGNOSIS — K30 DELAYED GASTRIC EMPTYING: Chronic | ICD-10-CM

## 2020-03-03 DIAGNOSIS — R10.13 EPIGASTRIC PAIN: Chronic | ICD-10-CM

## 2020-03-03 DIAGNOSIS — R12 HEARTBURN: Chronic | ICD-10-CM

## 2020-03-03 DIAGNOSIS — R13.10 DYSPHAGIA, UNSPECIFIED TYPE: Primary | Chronic | ICD-10-CM

## 2020-03-03 PROCEDURE — 99214 OFFICE O/P EST MOD 30 MIN: CPT | Performed by: NURSE PRACTITIONER

## 2020-03-03 RX ORDER — METOCLOPRAMIDE 5 MG/1
TABLET ORAL
Qty: 30 TABLET | Refills: 1 | Status: SHIPPED | OUTPATIENT
Start: 2020-03-03 | End: 2020-03-05 | Stop reason: HOSPADM

## 2020-03-03 RX ORDER — SODIUM CHLORIDE 9 MG/ML
70 INJECTION, SOLUTION INTRAVENOUS CONTINUOUS PRN
Status: CANCELLED | OUTPATIENT
Start: 2020-03-05

## 2020-03-03 RX ORDER — PANTOPRAZOLE SODIUM 40 MG/1
TABLET, DELAYED RELEASE ORAL
Qty: 90 TABLET | Refills: 1 | Status: SHIPPED | OUTPATIENT
Start: 2020-03-03 | End: 2020-05-15

## 2020-03-03 NOTE — H&P (VIEW-ONLY)
Chief Complaint   Patient presents with   • Follow-up   • Difficulty Swallowing     There is a long standing history of difficulty swallowing. Swallowing has gradually worsened. He is having difficulty swallowing solids every time he eats and occasionally has difficulty swallowing liquids. He has a history of esophageal rings in the past.    There is a long standing history of heartburn. Heartburn is well controlled with Pantoprazole and Metoclopramide. Heartburn is moderate to severe. Reflux is worse at night. The patient denies side effects with Metoclopramide, no diarrhea, tremors or abnormal movements of the face or tongue.     There is a history of epigastric pain for the past year or so. The pain has significantly improved. He rarely has the pain since taking Metoclopramide. The patient may have the pain 1-2 times per week. The pain is described as moderate and cramping. Eating does not affect the pain. The patient denies nausea or vomiting.      The patient has a history of diarrhea off and on for a few years. Diarrhea has rapidly improved. He has not had any episodes of diarrhea since his last office visit. The patient denies bright red blood per rectum or melena.      The patient has not had a colonoscopy in the past. There is no family history of colon cancer.    Abdominal Pain   This is a chronic problem. The current episode started more than 1 year ago. The onset quality is gradual. The problem occurs rarely. The problem has been waxing and waning. The pain is located in the epigastric region. The pain is moderate. The quality of the pain is cramping. The abdominal pain does not radiate. Pertinent negatives include no arthralgias, constipation, diarrhea, dysuria, fever, headaches, hematuria, myalgias, nausea or vomiting. Nothing aggravates the pain. The pain is relieved by nothing. Treatments tried: PPI therapy and Metoclopramide. The treatment provided significant relief. Prior diagnostic workup  includes upper endoscopy and ultrasound. His past medical history is significant for GERD.   Heartburn   He complains of heartburn. He reports no abdominal pain, no chest pain, no coughing or no nausea. This is a chronic problem. Episode onset: 2016. The problem occurs occasionally. The problem has been gradually improving. The heartburn duration is an hour. The heartburn is located in the substernum. The heartburn is of moderate intensity. The heartburn wakes him from sleep. Nothing aggravates the symptoms. Pertinent negatives include no fatigue. There are no known risk factors. He has tried a PPI for the symptoms. The treatment provided significant relief. Past procedures include an abdominal ultrasound (12/10/2019) and an EGD (2019).   Difficulty Swallowing   This is a chronic problem. Episode onset: November 2019. The problem occurs daily. The problem has been gradually worsening. Pertinent negatives include no abdominal pain, arthralgias, chest pain, chills, coughing, fatigue, fever, headaches, joint swelling, myalgias, nausea, rash or vomiting. The symptoms are aggravated by eating and drinking. He has tried nothing for the symptoms.     Review of Systems   Constitutional: Negative for appetite change, chills, fatigue, fever and unexpected weight change.   HENT: Positive for trouble swallowing. Negative for mouth sores and nosebleeds.    Eyes: Negative for discharge and redness.   Respiratory: Negative for apnea, cough and shortness of breath.    Cardiovascular: Negative for chest pain, palpitations and leg swelling.   Gastrointestinal: Positive for heartburn. Negative for abdominal distention, abdominal pain, anal bleeding, blood in stool, constipation, diarrhea, nausea and vomiting.   Endocrine: Negative for cold intolerance, heat intolerance and polydipsia.   Genitourinary: Negative for dysuria, hematuria and urgency.   Musculoskeletal: Negative for arthralgias, joint swelling and myalgias.   Skin: Negative  for rash.   Allergic/Immunologic: Negative for food allergies and immunocompromised state.   Neurological: Negative for dizziness, seizures, syncope and headaches.   Hematological: Negative for adenopathy. Does not bruise/bleed easily.   Psychiatric/Behavioral: Negative for dysphoric mood. The patient is not nervous/anxious and is not hyperactive.      Patient Active Problem List   Diagnosis   • Gastroesophageal reflux disease with esophagitis   • Chronic gastritis without bleeding   • Dysphagia   • Heartburn   • Diarrhea   • Delayed gastric emptying   • Esophagitis   • Epigastric pain     Past Medical History:   Diagnosis Date   • Asperger syndrome    • Diarrhea    • Dysphagia     MOTHER REPORTS PATIENT HAS RECENTLY HAS GOTTEN CHOKED EASILY AND GETS FOOD STUCK IN THROAT.   • GERD (gastroesophageal reflux disease)    • H/O seasonal allergies    • Hearing loss     Patient's mother reported more loss on the left side but that patient has loss bilateral.  No use of hearing devices.    • Wears glasses      Past Surgical History:   Procedure Laterality Date   • EAR TUBES     • ENDOSCOPY N/A 7/15/2017    Procedure: ESOPHAGOGASTRODUODENOSCOPY with bx , removal foreign body;  Surgeon: Saroj Scott MD;  Location: [facility] OR;  Service:    • ENDOSCOPY N/A 8/24/2017    Procedure: ESOPHAGOGASTRODUODENOSCOPY ESOPHAGEAL DILATATION;  Surgeon: Saroj Scott MD;  Location: [facility] ENDOSCOPY;  Service:    • ENDOSCOPY N/A 4/9/2019    Procedure: ESOPHAGOGASTRODUODENOSCOPY FOR FOREIGN BODY, biopsy, and esophageal dilitation;  Surgeon: Tyrone Herzog MD;  Location: [facility] ENDOSCOPY;  Service: Gastroenterology   • ENDOSCOPY N/A 5/14/2019    Procedure: ESOPHAGOGASTRODUODENOSCOPY with biopsy;  Surgeon: Tyrone Herzog MD;  Location: [facility] ENDOSCOPY;  Service: Gastroenterology   • ESOPHAGOSCOPY / EGD     • UPPER GASTROINTESTINAL ENDOSCOPY  04/09/2019   • UPPER GASTROINTESTINAL ENDOSCOPY  05/14/2019     Family History   Problem Relation  "Age of Onset   • No Known Problems Mother    • No Known Problems Father    • Colon cancer Neg Hx      Social History     Tobacco Use   • Smoking status: Never Smoker   • Smokeless tobacco: Never Used   Substance Use Topics   • Alcohol use: No       Current Outpatient Medications:   •  metoclopramide (REGLAN) 5 MG tablet, 1/2 tablet twice a day, preferably 30 minutes before meals, Disp: 30 tablet, Rfl: 1  •  pantoprazole (PROTONIX) 40 MG EC tablet, take 1 tablet by mouth once daily 30 MINUTES BEFORE BREAKFAST, Disp: 90 tablet, Rfl: 1    No Known Allergies     /82   Pulse 72   Temp 97.8 °F (36.6 °C)   Resp 16   Ht 180.3 cm (71\")   Wt 100 kg (221 lb)   BMI 30.82 kg/m²     Physical Exam   Constitutional: He is oriented to person, place, and time. He appears well-developed and well-nourished. No distress.   HENT:   Head: Normocephalic and atraumatic.   Right Ear: Hearing and external ear normal.   Left Ear: Hearing and external ear normal.   Nose: Nose normal.   Mouth/Throat: Oropharynx is clear and moist and mucous membranes are normal. Mucous membranes are not pale, not dry and not cyanotic. No oral lesions. No oropharyngeal exudate.   Eyes: Conjunctivae and EOM are normal. Right eye exhibits no discharge. Left eye exhibits no discharge.   Neck: Trachea normal. Neck supple. No JVD present. No edema present. No thyroid mass and no thyromegaly present.   Cardiovascular: Normal rate, regular rhythm, S2 normal and normal heart sounds. Exam reveals no gallop, no S3 and no friction rub.   No murmur heard.  Pulmonary/Chest: Effort normal and breath sounds normal. No respiratory distress. He exhibits no tenderness.   Abdominal: Normal appearance and bowel sounds are normal. He exhibits no distension, no ascites and no mass. There is no splenomegaly or hepatomegaly. There is no tenderness. There is no rigidity, no rebound and no guarding. No hernia.     Vascular Status -  His right foot exhibits no edema. His left " foot exhibits no edema.  Lymphadenopathy:     He has no cervical adenopathy.        Left: No supraclavicular adenopathy present.   Neurological: He is alert and oriented to person, place, and time. He has normal strength. No cranial nerve deficit or sensory deficit.   Skin: No rash noted. He is not diaphoretic. No cyanosis. No pallor. Nails show no clubbing.   Psychiatric: He has a normal mood and affect.   Nursing note and vitals reviewed.  Stigmata of chronic liver disease:  None.  Asterixis:  None.    Laboratory Tests:   Upon review of records:    Dated 12/10/2019 glucose 98 BUN 10 creatinine 1.07 sodium 141 potassium 4.0 albumin 4.5 ALT 17 AST 12 alkaline phosphatase 81 total bilirubin 0.4 WBC 8.71 hemoglobin 16 hematocrit 47.1 platelet count 252 MCV 92.2 TSH 2.810, IgA 262, tTg IgA <2    Abdominal Imaging:  Upon review of records:    Gallbladder ultrasound dated 12/10/2019 reveals Limited images of the pancreas are unremarkable. The liver parenchyma is normal in echogenicity. The gallbladder is normal with no shadowing stones or wall thickening. There is no pericholecystic fluid. The common duct measures 3.70 mm. Limited images of the right kidney are unremarkable.    Procedures:  Upon review of records:    Dated April 9, 2019 the patient underwent an upper endoscopy which revealed: Esophageal foreign body.  Status post removal.  Tight esophageal concentric rings.  Status post serial dilation to 12 mm.  Erosive distal esophagitis.  LA class A.  Small sliding hiatal hernia less than 3 cm.  Erythematous-erosive gastritis.  10 x 5 mm clean-based prepyloric ulcer.  Changes seen within the esophagus are suggestive of eosinophilic esophagitis.  Stomach biopsy revealed antral mucosa with reactive gastropathy and mild to moderate chronic inflammation.  Negative for Helicobacter pylori by immunohistochemical stain.  Negative for intestinal metaplasia, dysplasia or malignancy.  Esophagus, biopsy revealed squamous mucosa  with reactive changes, intraepithelial eosinophils and surface erosion.  Rare fungal organisms morphologically consistent with Candida species identified by PAS special stain.  Negative for glandular mucosa, intestinal metaplasia, dysplasia or malignancy.  Sections of the mid and distal esophageal biopsy show squamous mucosa with marked spongiosis, expansion of the basal layer, and mixed inflammatory infiltrate with neutrophils and eosinophils (up to 25 intraepithelial eosinophils per high magnification field).  There are focal eosinophilic microabscesses, and the superficial mucosa shows areas of erosion.  A PAS special stain shows rare fungal organisms morphologically consistent with Candida species.  The differential diagnosis for this degree of eosinophilia and histologic changes include eosinophilic esophagitis, proton pump inhibitor therapy response and esophageal eosinophilia, and gastroesophageal reflux.  Clinical and endoscopic correlation is recommended.     EGD dated 5/14/2019 reveals erythematous distal esophagitis.  Erythematous gastritis.  Changes consistent with delayed gastric emptying; partly digested food residue within stomach, patient nothing by mouth for 10 hours, and no gastric outlet obstruction.  Small sliding hiatal hernia less than 3 cm.  Antrum biopsy reveals mild reactive gastropathy with focal superficial vascular congestion.  Negative for H. pylori, intestinal metaplasia, dysplasia and malignancy.    Assessment:      ICD-10-CM ICD-9-CM   1. Dysphagia, unspecified type R13.10 787.20   2. Epigastric pain R10.13 789.06   3. Delayed gastric emptying K30 536.8   4. Heartburn R12 787.1     Plan/  Patient Instructions   1. Antireflux measures: Avoid fried, fatty foods, alcohol, chocolate, coffee, tea,  soft drinks, peppermint and spearmint, spicy foods, tomatoes and tomato based foods, onion based foods, and smoking. Other antireflux measures include weight reduction if overweight, avoiding  tight clothing around the abdomen, elevating the head of the bed 6 inches with blocks under the head board, and don't drink or eat before going to bed and avoid lying down immediately after meals.  2. Pantoprazole 40 mg 1 by mouth in the am 30 minutes before breakfast.  3. The patient should eat very small meals throughout the day. Avoid large meals.  4. It is recommended to eat a softer diet. Meats are best consumed ground. Fruits and vegetables are best consumed cooked or steamed and then mashed.   5. Metoclopramide 5 mg 1/2 tablet by mouth twice a day, preferably 30 minutes before meals. Discussed short and long term side effects. The patient verbalizes understanding.   6. The patient should eat relatively soft diet, and should eat in upright position and chew well.  The patient should drink water after 2-3 bites and take medications with liberal amounts of water.  Furthermore after eating and taking medications, the patient should remain in upright position for 5-10 minutes.  7. Upper endoscopy-EGD: Description of the procedure, risks, benefits, alternatives and options, including nonoperative options, were discussed with the patient in detail. The patient understands and wishes to proceed.    The patient was seen along with his mother.     SHARATH Vera

## 2020-03-03 NOTE — PROGRESS NOTES
Chief Complaint   Patient presents with   • Follow-up   • Difficulty Swallowing     There is a long standing history of difficulty swallowing. Swallowing has gradually worsened. He is having difficulty swallowing solids every time he eats and occasionally has difficulty swallowing liquids. He has a history of esophageal rings in the past.    There is a long standing history of heartburn. Heartburn is well controlled with Pantoprazole and Metoclopramide. Heartburn is moderate to severe. Reflux is worse at night. The patient denies side effects with Metoclopramide, no diarrhea, tremors or abnormal movements of the face or tongue.     There is a history of epigastric pain for the past year or so. The pain has significantly improved. He rarely has the pain since taking Metoclopramide. The patient may have the pain 1-2 times per week. The pain is described as moderate and cramping. Eating does not affect the pain. The patient denies nausea or vomiting.      The patient has a history of diarrhea off and on for a few years. Diarrhea has rapidly improved. He has not had any episodes of diarrhea since his last office visit. The patient denies bright red blood per rectum or melena.      The patient has not had a colonoscopy in the past. There is no family history of colon cancer.    Abdominal Pain   This is a chronic problem. The current episode started more than 1 year ago. The onset quality is gradual. The problem occurs rarely. The problem has been waxing and waning. The pain is located in the epigastric region. The pain is moderate. The quality of the pain is cramping. The abdominal pain does not radiate. Pertinent negatives include no arthralgias, constipation, diarrhea, dysuria, fever, headaches, hematuria, myalgias, nausea or vomiting. Nothing aggravates the pain. The pain is relieved by nothing. Treatments tried: PPI therapy and Metoclopramide. The treatment provided significant relief. Prior diagnostic workup  includes upper endoscopy and ultrasound. His past medical history is significant for GERD.   Heartburn   He complains of heartburn. He reports no abdominal pain, no chest pain, no coughing or no nausea. This is a chronic problem. Episode onset: 2016. The problem occurs occasionally. The problem has been gradually improving. The heartburn duration is an hour. The heartburn is located in the substernum. The heartburn is of moderate intensity. The heartburn wakes him from sleep. Nothing aggravates the symptoms. Pertinent negatives include no fatigue. There are no known risk factors. He has tried a PPI for the symptoms. The treatment provided significant relief. Past procedures include an abdominal ultrasound (12/10/2019) and an EGD (2019).   Difficulty Swallowing   This is a chronic problem. Episode onset: November 2019. The problem occurs daily. The problem has been gradually worsening. Pertinent negatives include no abdominal pain, arthralgias, chest pain, chills, coughing, fatigue, fever, headaches, joint swelling, myalgias, nausea, rash or vomiting. The symptoms are aggravated by eating and drinking. He has tried nothing for the symptoms.     Review of Systems   Constitutional: Negative for appetite change, chills, fatigue, fever and unexpected weight change.   HENT: Positive for trouble swallowing. Negative for mouth sores and nosebleeds.    Eyes: Negative for discharge and redness.   Respiratory: Negative for apnea, cough and shortness of breath.    Cardiovascular: Negative for chest pain, palpitations and leg swelling.   Gastrointestinal: Positive for heartburn. Negative for abdominal distention, abdominal pain, anal bleeding, blood in stool, constipation, diarrhea, nausea and vomiting.   Endocrine: Negative for cold intolerance, heat intolerance and polydipsia.   Genitourinary: Negative for dysuria, hematuria and urgency.   Musculoskeletal: Negative for arthralgias, joint swelling and myalgias.   Skin: Negative  for rash.   Allergic/Immunologic: Negative for food allergies and immunocompromised state.   Neurological: Negative for dizziness, seizures, syncope and headaches.   Hematological: Negative for adenopathy. Does not bruise/bleed easily.   Psychiatric/Behavioral: Negative for dysphoric mood. The patient is not nervous/anxious and is not hyperactive.      Patient Active Problem List   Diagnosis   • Gastroesophageal reflux disease with esophagitis   • Chronic gastritis without bleeding   • Dysphagia   • Heartburn   • Diarrhea   • Delayed gastric emptying   • Esophagitis   • Epigastric pain     Past Medical History:   Diagnosis Date   • Asperger syndrome    • Diarrhea    • Dysphagia     MOTHER REPORTS PATIENT HAS RECENTLY HAS GOTTEN CHOKED EASILY AND GETS FOOD STUCK IN THROAT.   • GERD (gastroesophageal reflux disease)    • H/O seasonal allergies    • Hearing loss     Patient's mother reported more loss on the left side but that patient has loss bilateral.  No use of hearing devices.    • Wears glasses      Past Surgical History:   Procedure Laterality Date   • EAR TUBES     • ENDOSCOPY N/A 7/15/2017    Procedure: ESOPHAGOGASTRODUODENOSCOPY with bx , removal foreign body;  Surgeon: Saroj Scott MD;  Location: [facility] OR;  Service:    • ENDOSCOPY N/A 8/24/2017    Procedure: ESOPHAGOGASTRODUODENOSCOPY ESOPHAGEAL DILATATION;  Surgeon: Saroj Scott MD;  Location: [facility] ENDOSCOPY;  Service:    • ENDOSCOPY N/A 4/9/2019    Procedure: ESOPHAGOGASTRODUODENOSCOPY FOR FOREIGN BODY, biopsy, and esophageal dilitation;  Surgeon: Tyrone Herzog MD;  Location: [facility] ENDOSCOPY;  Service: Gastroenterology   • ENDOSCOPY N/A 5/14/2019    Procedure: ESOPHAGOGASTRODUODENOSCOPY with biopsy;  Surgeon: Tyrone Herzog MD;  Location: [facility] ENDOSCOPY;  Service: Gastroenterology   • ESOPHAGOSCOPY / EGD     • UPPER GASTROINTESTINAL ENDOSCOPY  04/09/2019   • UPPER GASTROINTESTINAL ENDOSCOPY  05/14/2019     Family History   Problem Relation  "Age of Onset   • No Known Problems Mother    • No Known Problems Father    • Colon cancer Neg Hx      Social History     Tobacco Use   • Smoking status: Never Smoker   • Smokeless tobacco: Never Used   Substance Use Topics   • Alcohol use: No       Current Outpatient Medications:   •  metoclopramide (REGLAN) 5 MG tablet, 1/2 tablet twice a day, preferably 30 minutes before meals, Disp: 30 tablet, Rfl: 1  •  pantoprazole (PROTONIX) 40 MG EC tablet, take 1 tablet by mouth once daily 30 MINUTES BEFORE BREAKFAST, Disp: 90 tablet, Rfl: 1    No Known Allergies     /82   Pulse 72   Temp 97.8 °F (36.6 °C)   Resp 16   Ht 180.3 cm (71\")   Wt 100 kg (221 lb)   BMI 30.82 kg/m²     Physical Exam   Constitutional: He is oriented to person, place, and time. He appears well-developed and well-nourished. No distress.   HENT:   Head: Normocephalic and atraumatic.   Right Ear: Hearing and external ear normal.   Left Ear: Hearing and external ear normal.   Nose: Nose normal.   Mouth/Throat: Oropharynx is clear and moist and mucous membranes are normal. Mucous membranes are not pale, not dry and not cyanotic. No oral lesions. No oropharyngeal exudate.   Eyes: Conjunctivae and EOM are normal. Right eye exhibits no discharge. Left eye exhibits no discharge.   Neck: Trachea normal. Neck supple. No JVD present. No edema present. No thyroid mass and no thyromegaly present.   Cardiovascular: Normal rate, regular rhythm, S2 normal and normal heart sounds. Exam reveals no gallop, no S3 and no friction rub.   No murmur heard.  Pulmonary/Chest: Effort normal and breath sounds normal. No respiratory distress. He exhibits no tenderness.   Abdominal: Normal appearance and bowel sounds are normal. He exhibits no distension, no ascites and no mass. There is no splenomegaly or hepatomegaly. There is no tenderness. There is no rigidity, no rebound and no guarding. No hernia.     Vascular Status -  His right foot exhibits no edema. His left " foot exhibits no edema.  Lymphadenopathy:     He has no cervical adenopathy.        Left: No supraclavicular adenopathy present.   Neurological: He is alert and oriented to person, place, and time. He has normal strength. No cranial nerve deficit or sensory deficit.   Skin: No rash noted. He is not diaphoretic. No cyanosis. No pallor. Nails show no clubbing.   Psychiatric: He has a normal mood and affect.   Nursing note and vitals reviewed.  Stigmata of chronic liver disease:  None.  Asterixis:  None.    Laboratory Tests:   Upon review of records:    Dated 12/10/2019 glucose 98 BUN 10 creatinine 1.07 sodium 141 potassium 4.0 albumin 4.5 ALT 17 AST 12 alkaline phosphatase 81 total bilirubin 0.4 WBC 8.71 hemoglobin 16 hematocrit 47.1 platelet count 252 MCV 92.2 TSH 2.810, IgA 262, tTg IgA <2    Abdominal Imaging:  Upon review of records:    Gallbladder ultrasound dated 12/10/2019 reveals Limited images of the pancreas are unremarkable. The liver parenchyma is normal in echogenicity. The gallbladder is normal with no shadowing stones or wall thickening. There is no pericholecystic fluid. The common duct measures 3.70 mm. Limited images of the right kidney are unremarkable.    Procedures:  Upon review of records:    Dated April 9, 2019 the patient underwent an upper endoscopy which revealed: Esophageal foreign body.  Status post removal.  Tight esophageal concentric rings.  Status post serial dilation to 12 mm.  Erosive distal esophagitis.  LA class A.  Small sliding hiatal hernia less than 3 cm.  Erythematous-erosive gastritis.  10 x 5 mm clean-based prepyloric ulcer.  Changes seen within the esophagus are suggestive of eosinophilic esophagitis.  Stomach biopsy revealed antral mucosa with reactive gastropathy and mild to moderate chronic inflammation.  Negative for Helicobacter pylori by immunohistochemical stain.  Negative for intestinal metaplasia, dysplasia or malignancy.  Esophagus, biopsy revealed squamous mucosa  with reactive changes, intraepithelial eosinophils and surface erosion.  Rare fungal organisms morphologically consistent with Candida species identified by PAS special stain.  Negative for glandular mucosa, intestinal metaplasia, dysplasia or malignancy.  Sections of the mid and distal esophageal biopsy show squamous mucosa with marked spongiosis, expansion of the basal layer, and mixed inflammatory infiltrate with neutrophils and eosinophils (up to 25 intraepithelial eosinophils per high magnification field).  There are focal eosinophilic microabscesses, and the superficial mucosa shows areas of erosion.  A PAS special stain shows rare fungal organisms morphologically consistent with Candida species.  The differential diagnosis for this degree of eosinophilia and histologic changes include eosinophilic esophagitis, proton pump inhibitor therapy response and esophageal eosinophilia, and gastroesophageal reflux.  Clinical and endoscopic correlation is recommended.     EGD dated 5/14/2019 reveals erythematous distal esophagitis.  Erythematous gastritis.  Changes consistent with delayed gastric emptying; partly digested food residue within stomach, patient nothing by mouth for 10 hours, and no gastric outlet obstruction.  Small sliding hiatal hernia less than 3 cm.  Antrum biopsy reveals mild reactive gastropathy with focal superficial vascular congestion.  Negative for H. pylori, intestinal metaplasia, dysplasia and malignancy.    Assessment:      ICD-10-CM ICD-9-CM   1. Dysphagia, unspecified type R13.10 787.20   2. Epigastric pain R10.13 789.06   3. Delayed gastric emptying K30 536.8   4. Heartburn R12 787.1     Plan/  Patient Instructions   1. Antireflux measures: Avoid fried, fatty foods, alcohol, chocolate, coffee, tea,  soft drinks, peppermint and spearmint, spicy foods, tomatoes and tomato based foods, onion based foods, and smoking. Other antireflux measures include weight reduction if overweight, avoiding  tight clothing around the abdomen, elevating the head of the bed 6 inches with blocks under the head board, and don't drink or eat before going to bed and avoid lying down immediately after meals.  2. Pantoprazole 40 mg 1 by mouth in the am 30 minutes before breakfast.  3. The patient should eat very small meals throughout the day. Avoid large meals.  4. It is recommended to eat a softer diet. Meats are best consumed ground. Fruits and vegetables are best consumed cooked or steamed and then mashed.   5. Metoclopramide 5 mg 1/2 tablet by mouth twice a day, preferably 30 minutes before meals. Discussed short and long term side effects. The patient verbalizes understanding.   6. The patient should eat relatively soft diet, and should eat in upright position and chew well.  The patient should drink water after 2-3 bites and take medications with liberal amounts of water.  Furthermore after eating and taking medications, the patient should remain in upright position for 5-10 minutes.  7. Upper endoscopy-EGD: Description of the procedure, risks, benefits, alternatives and options, including nonoperative options, were discussed with the patient in detail. The patient understands and wishes to proceed.    The patient was seen along with his mother.     SHARATH Vera

## 2020-03-03 NOTE — PATIENT INSTRUCTIONS
1. Antireflux measures: Avoid fried, fatty foods, alcohol, chocolate, coffee, tea,  soft drinks, peppermint and spearmint, spicy foods, tomatoes and tomato based foods, onion based foods, and smoking. Other antireflux measures include weight reduction if overweight, avoiding tight clothing around the abdomen, elevating the head of the bed 6 inches with blocks under the head board, and don't drink or eat before going to bed and avoid lying down immediately after meals.  2. Pantoprazole 40 mg 1 by mouth in the am 30 minutes before breakfast.  3. The patient should eat very small meals throughout the day. Avoid large meals.  4. It is recommended to eat a softer diet. Meats are best consumed ground. Fruits and vegetables are best consumed cooked or steamed and then mashed.   5. Metoclopramide 5 mg 1/2 tablet by mouth twice a day, preferably 30 minutes before meals. Discussed short and long term side effects. The patient verbalizes understanding.   6. The patient should eat relatively soft diet, and should eat in upright position and chew well.  The patient should drink water after 2-3 bites and take medications with liberal amounts of water.  Furthermore after eating and taking medications, the patient should remain in upright position for 5-10 minutes.  7. Upper endoscopy-EGD: Description of the procedure, risks, benefits, alternatives and options, including nonoperative options, were discussed with the patient in detail. The patient understands and wishes to proceed.    The patient was seen along with his mother.

## 2020-03-04 ENCOUNTER — ANESTHESIA EVENT (OUTPATIENT)
Dept: GASTROENTEROLOGY | Facility: HOSPITAL | Age: 25
End: 2020-03-04

## 2020-03-04 NOTE — ANESTHESIA PREPROCEDURE EVALUATION
Anesthesia Evaluation     Patient summary reviewed and Nursing notes reviewed   no history of anesthetic complications:  NPO Solid Status: > 8 hours  NPO Liquid Status: > 8 hours           Airway   Mallampati: II  TM distance: >3 FB  Neck ROM: full  No difficulty expected  Dental - normal exam     Pulmonary - normal exam   (+) sleep apnea,   (-) not a smoker  Cardiovascular - negative cardio ROS and normal exam  Exercise tolerance: excellent (>7 METS)        Neuro/Psych  (+) psychiatric history,     GI/Hepatic/Renal/Endo    (+) obesity, morbid obesity, GERD,      Musculoskeletal (-) negative ROS    Abdominal  - normal exam    Bowel sounds: normal.   Substance History - negative use     OB/GYN negative ob/gyn ROS         Other                          Anesthesia Plan    ASA 2     MAC   (Risks and benefits discussed including risk of aspiration, recall and dental damage. All patient questions answered.    Will continue with plan of care.)  intravenous induction     Anesthetic plan, all risks, benefits, and alternatives have been provided, discussed and informed consent has been obtained with: patient.    Plan discussed with attending.

## 2020-03-05 ENCOUNTER — HOSPITAL ENCOUNTER (OUTPATIENT)
Facility: HOSPITAL | Age: 25
Setting detail: HOSPITAL OUTPATIENT SURGERY
Discharge: HOME | End: 2020-03-05
Attending: INTERNAL MEDICINE | Admitting: INTERNAL MEDICINE

## 2020-03-05 ENCOUNTER — ANESTHESIA (OUTPATIENT)
Dept: GASTROENTEROLOGY | Facility: HOSPITAL | Age: 25
End: 2020-03-05

## 2020-03-05 VITALS
HEIGHT: 71 IN | DIASTOLIC BLOOD PRESSURE: 92 MMHG | BODY MASS INDEX: 30.94 KG/M2 | HEART RATE: 80 BPM | WEIGHT: 221 LBS | TEMPERATURE: 97.7 F | OXYGEN SATURATION: 97 % | RESPIRATION RATE: 18 BRPM | SYSTOLIC BLOOD PRESSURE: 133 MMHG

## 2020-03-05 DIAGNOSIS — R13.10 DYSPHAGIA, UNSPECIFIED TYPE: ICD-10-CM

## 2020-03-05 DIAGNOSIS — R10.13 EPIGASTRIC PAIN: ICD-10-CM

## 2020-03-05 DIAGNOSIS — R12 HEARTBURN: ICD-10-CM

## 2020-03-05 PROCEDURE — 25010000002 MIDAZOLAM PER 1MG: Performed by: NURSE ANESTHETIST, CERTIFIED REGISTERED

## 2020-03-05 PROCEDURE — 25010000002 PROPOFOL 10 MG/ML EMULSION: Performed by: NURSE ANESTHETIST, CERTIFIED REGISTERED

## 2020-03-05 PROCEDURE — 43239 EGD BIOPSY SINGLE/MULTIPLE: CPT | Performed by: INTERNAL MEDICINE

## 2020-03-05 PROCEDURE — 25010000002 METOCLOPRAMIDE PER 10 MG: Performed by: NURSE ANESTHETIST, CERTIFIED REGISTERED

## 2020-03-05 PROCEDURE — 25010000003 MEPERIDINE PER 100 MG: Performed by: NURSE ANESTHETIST, CERTIFIED REGISTERED

## 2020-03-05 RX ORDER — METOCLOPRAMIDE HYDROCHLORIDE 5 MG/ML
INJECTION INTRAMUSCULAR; INTRAVENOUS AS NEEDED
Status: DISCONTINUED | OUTPATIENT
Start: 2020-03-05 | End: 2020-03-05 | Stop reason: SURG

## 2020-03-05 RX ORDER — MEPERIDINE HYDROCHLORIDE 25 MG/ML
INJECTION INTRAMUSCULAR; INTRAVENOUS; SUBCUTANEOUS AS NEEDED
Status: DISCONTINUED | OUTPATIENT
Start: 2020-03-05 | End: 2020-03-05 | Stop reason: SURG

## 2020-03-05 RX ORDER — PROPOFOL 10 MG/ML
VIAL (ML) INTRAVENOUS AS NEEDED
Status: DISCONTINUED | OUTPATIENT
Start: 2020-03-05 | End: 2020-03-05 | Stop reason: SURG

## 2020-03-05 RX ORDER — METOCLOPRAMIDE 5 MG/1
2.5 TABLET ORAL
Qty: 45 TABLET | Refills: 1 | Status: SHIPPED | OUTPATIENT
Start: 2020-03-05 | End: 2020-05-12

## 2020-03-05 RX ORDER — SODIUM CHLORIDE 0.9 % (FLUSH) 0.9 %
10 SYRINGE (ML) INJECTION AS NEEDED
Status: DISCONTINUED | OUTPATIENT
Start: 2020-03-05 | End: 2020-03-05 | Stop reason: HOSPADM

## 2020-03-05 RX ORDER — SODIUM CHLORIDE 9 MG/ML
70 INJECTION, SOLUTION INTRAVENOUS CONTINUOUS PRN
Status: DISCONTINUED | OUTPATIENT
Start: 2020-03-05 | End: 2020-03-05 | Stop reason: HOSPADM

## 2020-03-05 RX ORDER — LIDOCAINE HYDROCHLORIDE 20 MG/ML
INJECTION, SOLUTION INTRAVENOUS AS NEEDED
Status: DISCONTINUED | OUTPATIENT
Start: 2020-03-05 | End: 2020-03-05 | Stop reason: SURG

## 2020-03-05 RX ORDER — KETAMINE HCL IN NACL, ISO-OSM 100MG/10ML
SYRINGE (ML) INJECTION AS NEEDED
Status: DISCONTINUED | OUTPATIENT
Start: 2020-03-05 | End: 2020-03-05 | Stop reason: SURG

## 2020-03-05 RX ORDER — MIDAZOLAM HYDROCHLORIDE 2 MG/2ML
INJECTION, SOLUTION INTRAMUSCULAR; INTRAVENOUS AS NEEDED
Status: DISCONTINUED | OUTPATIENT
Start: 2020-03-05 | End: 2020-03-05 | Stop reason: SURG

## 2020-03-05 RX ORDER — SIMETHICONE 20 MG/.3ML
EMULSION ORAL AS NEEDED
Status: DISCONTINUED | OUTPATIENT
Start: 2020-03-05 | End: 2020-03-05 | Stop reason: HOSPADM

## 2020-03-05 RX ADMIN — LIDOCAINE HYDROCHLORIDE 40 MG: 20 INJECTION, SOLUTION INTRAVENOUS at 08:34

## 2020-03-05 RX ADMIN — MIDAZOLAM HYDROCHLORIDE 2 MG: 1 INJECTION, SOLUTION INTRAMUSCULAR; INTRAVENOUS at 08:30

## 2020-03-05 RX ADMIN — Medication 25 MG: at 08:34

## 2020-03-05 RX ADMIN — METOCLOPRAMIDE 5 MG: 5 INJECTION, SOLUTION INTRAMUSCULAR; INTRAVENOUS at 08:40

## 2020-03-05 RX ADMIN — PROPOFOL 30 MG: 10 INJECTION, EMULSION INTRAVENOUS at 08:36

## 2020-03-05 RX ADMIN — MEPERIDINE HYDROCHLORIDE 25 MG: 25 INJECTION, SOLUTION INTRAMUSCULAR; INTRAVENOUS; SUBCUTANEOUS at 08:32

## 2020-03-05 RX ADMIN — SODIUM CHLORIDE: 9 INJECTION, SOLUTION INTRAVENOUS at 08:20

## 2020-03-05 RX ADMIN — PROPOFOL 20 MG: 10 INJECTION, EMULSION INTRAVENOUS at 08:34

## 2020-03-05 NOTE — DISCHARGE INSTRUCTIONS
To assist you in voiding:  Drink plenty of fluids  Listen to running water while attempting to void.    If you are unable to urinate and you have an uncomfortable urge to void or it has been   6 hours since you were discharged, return to the Emergency Room.    Rest today  No pushing,pulling,tugging,heavy lifting, or strenuous activity   No major decision making,driving,or drinking alcoholic beverages for 24 hours due to the sedation you received  Always use good hand hygiene/washing technique  No driving on pain medication.      Postprocedure instructions.  1. Nothing by mouth for 1 hour.  2. Clear liquids diet (No Sodas) for 1 hours.  3. May advance to soft low-fat diet  in 2 hours       Diet otherwise:    1. Low fat diet The patient should eat smaller meals and softer food in general.    2. Vegetables are best consumed as steamed or mashed.    3. Fruit should be mashed or blenderized.    4. Meat is best consumed as ground.    Other Instructions:  Call [facility] at [phone] or come to the Emergency Department if you experience the following: Chest pain, abdominal pain, bleeding (vomiting of blood or coffee colored material, black stools or chelita blood in stools),   fever/chills, nausea and vomiting or dizziness.    Follow-up:    Dr. Herzog in 3-4 weeks. Office phone #[phone]. If you already have an appointment just keep that appointment.    ************************************************************************************    Notes to the patient and the family from your Doctor    Dear patient/family member,    Findings on today's procedure are as follows:    1. Esophagitis. Inflammation of the esophagus.  2. Poor emptying of the stomach. No blockage of the stomach.  3. Esophageal rings.  Early stricturing of the distal esophagus.  4. Inflammation of the stomach. Gastritis.  5. Small sliding hiatal hernia.    Recommendations:    1. Protonix (Pantoprazole) tablet 40 mg tablet. Take 1  tablet orally in the morning half an hour before eating every day.  2. Reglan (metoclopramide) 5 mg tablets.  Reglan (metoclopramide) 5 mg tablets.  Take one half tablet (2.5 mg) by mouth in the morning, afternoon and at night (3 times daily preferably half an hour before food).  3. As above.      Should you have more questions please do not hesitate to talk to the nurse who can call me and let me talk to you.  I hope you feel better.    Tyrone Herzog M.D., FACP, FACG.

## 2020-03-05 NOTE — OP NOTE
PROCEDURE:  Upper Endoscopy with biopsies.    DATE OF PROCEDURE: March 5, 2020.    REFERRING PROVIDER:  Silvano Cazares MD.     INSTRUMENT:  Olympus GIF H 190 video endoscope     INDICATIONS OF THE PROCEDURE: This is a 24-year-old white male with history of recurrent epigastric abdominal pain, reflux and dysphagia.     BIOPSIES: Gastric antrum.       MEDICATIONS:  MAC.     PHOTOGRAPHS:  Photographs were included in the medical records.     CONSENT/PREPROCEDURE EVALUATION:  Risks, benefits, alternatives and options of the procedure including risks of anesthesia/sedation were discussed and informed consent was obtained prior to the procedure. History and physical examination were performed and nothing precluded the test.     REPORT:  The patient was placed in left lateral decubitus position. Once under the influence of IV sedation, the instrument was inserted into the mouth and esophagus was intubated under direct vision without difficulty.     Esophagus:  Z line was noted to be around 40 cm.  Subtle concentric rings were noted within the mid and distal esophagus.  Early stricturing of the distal esophagus was noted.  A small sliding hiatal hernia less than 3 cm was noted.  No Liriano's esophagus was seen. Erythematous distal esophagitis was seen.       Stomach:  Significant amount of partly digested food residue was seen in the stomach which precluded a detailed evaluation.  Visualized portions of the stomach revealed:    Antrum:  Erythematous gastritis.  Angulus, lesser and greater curves: Normal.  Retroflex examination: Sliding hiatal hernia.  Cardia and fundus:   Not visualized.    Body of the stomach: Erythematous gastritis.  Good distensibility of the stomach was achieved no giant folds were noted.   Biopsies were obtained from the gastric antrum.     Pylorus and pyloric channel:  normal.     Duodenum:  Bulb: Normal.  Second portion: normal.  No scalloping was seen in the second portion of duodenum.       Intervention:  None.       The upper GI tract was decompressed and the scope was pulled out of the patient. The patient tolerated the procedure well.     DIAGNOSES:     1. Erythematous distal esophagitis.  2. Subtle concentric rings involving the mid and distal esophagus.  Early stricturing of the distal esophagus.  3. Erythematous gastritis.    4. Changes consistent with delayed gastric emptying (partly digested food residue within the stomach, patient nothing by mouth for 10 hours, and no gastric outlet obstruction).  5. Small sliding hiatal hernia less than 3 cm.  6. No intervention in terms of dilation of the esophagus was undertaken in view of presence of significant amount of partly digested food residue.    RECOMMENDATIONS:  1.  Dietary instructions.  2.  Pantoprazole 40 mg 1 p.o. every morning 1/2-hour before breakfast.  3.  Follow biopsies.  4.  Follow up in office.  5.  Reglan (metoclopramide) 5 mg tablets.  Take one half tablet (2.5 mg) by mouth in the morning and in the evening (2 times daily preferably half an hour before food).      Thank you very much for letting me participate in the care of this patient. Please do not hesitate to call me if you have any questions.

## 2020-03-05 NOTE — ANESTHESIA POSTPROCEDURE EVALUATION
Patient: [place]    Procedure Summary     Date:  03/05/20 Room / Location:  [facility] ENDOSCOPY 2 / [facility] ENDOSCOPY    Anesthesia Start:  0829 Anesthesia Stop:      Procedure:  ESOPHAGOGASTRODUODENOSCOPY (N/A Esophagus) Diagnosis:       Esophagitis      Esophageal ring      Delayed gastric emptying      Gastritis      (Dysphagia, unspecified type [R13.10])      (Epigastric pain [R10.13])      (Heartburn [R12])    Surgeon:  Tyrone Herzog MD Provider:  Flaco Crow CRNA    Anesthesia Type:  MAC ASA Status:  2          Anesthesia Type: MAC    Vitals  No vitals data found for the desired time range.          Post Anesthesia Care and Evaluation    Patient location during evaluation: PHASE II  Patient participation: complete - patient participated  Level of consciousness: awake  Pain score: 0  Pain management: adequate  Airway patency: patent  Anesthetic complications: No anesthetic complications  PONV Status: none  Cardiovascular status: acceptable  Respiratory status: acceptable and nasal cannula  Hydration status: acceptable    Comments: vsss resp spont, reflexes intact, responsive, report given to pacu nurse

## 2020-03-06 NOTE — INTERVAL H&P NOTE
"  H&P reviewed. The patient was examined and there are no changes to the H&P.       No recent shortness of breath or chest pains.      Blood pressure 133/92, pulse 80, temperature 97.7 °F (36.5 °C), temperature source Temporal, resp. rate 18, height 180.3 cm (71\"), weight 100 kg (221 lb), SpO2 97 %.    Chest: clear to auscultation.  Cardiac exam: No S3 no murmurs.   Abdomen: soft bowel sounds present nondistended nontender.        "

## 2020-03-12 LAB
LAB AP CASE REPORT: NORMAL
PATH REPORT.FINAL DX SPEC: NORMAL

## 2020-05-13 RX ORDER — METOCLOPRAMIDE 5 MG/1
2.5 TABLET ORAL
Qty: 30 TABLET | Refills: 1 | Status: SHIPPED | OUTPATIENT
Start: 2020-05-13 | End: 2020-08-27

## 2020-05-14 DIAGNOSIS — R12 HEARTBURN: Chronic | ICD-10-CM

## 2020-05-18 RX ORDER — PANTOPRAZOLE SODIUM 40 MG/1
TABLET, DELAYED RELEASE ORAL
Qty: 90 TABLET | Refills: 0 | Status: SHIPPED | OUTPATIENT
Start: 2020-05-18 | End: 2020-08-07

## 2020-05-27 ENCOUNTER — TELEMEDICINE (OUTPATIENT)
Dept: GASTROENTEROLOGY | Facility: CLINIC | Age: 25
End: 2020-05-27

## 2020-05-27 DIAGNOSIS — R10.13 EPIGASTRIC PAIN: Chronic | ICD-10-CM

## 2020-05-27 DIAGNOSIS — K30 DELAYED GASTRIC EMPTYING: Chronic | ICD-10-CM

## 2020-05-27 DIAGNOSIS — R13.10 DYSPHAGIA, UNSPECIFIED TYPE: Primary | Chronic | ICD-10-CM

## 2020-05-27 DIAGNOSIS — R12 HEARTBURN: Chronic | ICD-10-CM

## 2020-05-27 PROBLEM — R19.7 DIARRHEA: Chronic | Status: RESOLVED | Noted: 2019-08-22 | Resolved: 2020-05-27

## 2020-05-27 PROCEDURE — 99214 OFFICE O/P EST MOD 30 MIN: CPT | Performed by: NURSE PRACTITIONER

## 2020-05-27 RX ORDER — SODIUM CHLORIDE 9 MG/ML
70 INJECTION, SOLUTION INTRAVENOUS CONTINUOUS PRN
Status: CANCELLED | OUTPATIENT
Start: 2020-05-27

## 2020-05-27 NOTE — PATIENT INSTRUCTIONS
1. Antireflux measures: Avoid fried, fatty foods, alcohol, chocolate, coffee, tea,  soft drinks, peppermint and spearmint, spicy foods, tomatoes and tomato based foods, onion based foods, and smoking. Other antireflux measures include weight reduction if overweight, avoiding tight clothing around the abdomen, elevating the head of the bed 6 inches with blocks under the head board, and don't drink or eat before going to bed and avoid lying down immediately after meals.  2. Pantoprazole 40 mg 1 by mouth in the am 30 minutes before breakfast.  3. Metoclopramide 5 mg 1/2 tablet by mouth three times a day, preferably 30 minutes before meals. Discussed short and long term side effects. The patient verbalizes understanding.   4. The patient should eat very small meals throughout the day. Avoid large meals.  5. It is recommended to eat a softer diet. Meats are best consumed ground. Fruits and vegetables are best consumed cooked or steamed and then mashed.   6. Dietary instructions: The patient should eat in upright position and chew well.  The patient should drink water after 2-3 bites and take medications with liberal amounts of water.  Generally medications that have a potential to cause pill esophagitis may be avoided or used in an alternative form (for example if the patient needs potassium it may be used in a liquid rather than pill form).  Furthermore after eating and taking medications, the patient should remain in upright position for 5-10 minutes.  7. High fiber, low fat diet with liberal water intake.   8. Upper endoscopy-EGD: Description of the procedure, risks, benefits, alternatives and options, including nonoperative options, were discussed with the patient in detail. The patient understands and wishes to proceed. The patient has been advised to eat a soft diet and no solids by mouth after 6 pm on the day prior to the EGD. The patient's mother will call back this week to schedule procedure.   9. COVID-19  testing prior to procedure. The patient will need to self-quarantine after testing until the procedure. Instructions to be given to patient.    This was an audio and video enabled telemedicine encounter. The patient's mother was present during video visit.

## 2020-05-27 NOTE — PROGRESS NOTES
Chief Complaint   Patient presents with   • Follow-up     You have chosen to receive care through a telehealth visit.  Do you consent to use a video/audio connection for your medical care today? Yes    There is a long standing history of difficulty swallowing. Swallowing has been unchanged. The patient has difficulty swallowing solids and liquids on a daily basis, solids worse than liquids. He recently had EGD with esophageal rings noted, but the patient had a significant amount of food in his stomach and dilation was not performed.     There is a long standing history of heartburn. Heartburn is well controlled with Pantoprazole and Metoclopramide. Heartburn is moderate to severe. Reflux is worse at night. The patient denies side effects with Metoclopramide, no diarrhea, tremors or abnormal movements of the face or tongue.     There is a history of epigastric pain for the past year or so. The pain has significantly improved. He rarely has the pain since taking Metoclopramide. The patient may have the pain 1-2 times per week. The pain is described as moderate and cramping. Eating does not affect the pain. The patient denies nausea or vomiting.      The patient denies constipation or diarrhea. The patient denies bright red blood per rectum or melena.      The patient has not had a colonoscopy in the past. There is no family history of colon cancer.     Abdominal Pain   This is a chronic problem. The current episode started more than 1 year ago. The onset quality is gradual. The problem occurs rarely. The problem has been waxing and waning. The pain is located in the epigastric region. The pain is moderate. The quality of the pain is cramping. The abdominal pain does not radiate. Pertinent negatives include no arthralgias, constipation, diarrhea, dysuria, fever, headaches, hematochezia, hematuria, melena, myalgias, nausea or vomiting. Nothing aggravates the pain. The pain is relieved by nothing. Treatments tried: PPI  therapy and Metoclopramide. The treatment provided significant relief. Prior diagnostic workup includes upper endoscopy and ultrasound. His past medical history is significant for GERD.   Heartburn   He complains of dysphagia and heartburn. He reports no abdominal pain, no chest pain, no coughing or no nausea. This is a chronic problem. Episode onset: 2016. The problem occurs occasionally. The problem has been gradually improving. The heartburn duration is an hour. The heartburn is located in the substernum. The heartburn is of moderate intensity. The heartburn wakes him from sleep. Nothing aggravates the symptoms. Pertinent negatives include no fatigue or melena. There are no known risk factors. He has tried a PPI and a pro-motility drug for the symptoms. The treatment provided significant relief. Past procedures include an abdominal ultrasound (12/10/2019) and an EGD (2019, 2020).   Difficulty Swallowing   This is a chronic problem. Episode onset: November 2019. The problem occurs daily. The problem has been unchanged. Pertinent negatives include no abdominal pain, arthralgias, change in bowel habit, chest pain, chills, coughing, fatigue, fever, headaches, joint swelling, myalgias, nausea, rash or vomiting. The symptoms are aggravated by eating and drinking. He has tried nothing for the symptoms.     Review of Systems   Constitutional: Negative for chills, fatigue and fever.   Respiratory: Negative for cough.    Cardiovascular: Negative for chest pain.   Gastrointestinal: Positive for dysphagia and heartburn. Negative for abdominal pain, change in bowel habit, constipation, diarrhea, hematochezia, melena, nausea and vomiting.   Genitourinary: Negative for dysuria and hematuria.   Musculoskeletal: Negative for arthralgias, joint swelling and myalgias.   Skin: Negative for rash.   Neurological: Negative for headaches.     Patient Active Problem List   Diagnosis   • Gastroesophageal reflux disease with esophagitis    • Chronic gastritis without bleeding   • Dysphagia   • Heartburn   • Delayed gastric emptying   • Esophagitis   • Epigastric pain     Past Medical History:   Diagnosis Date   • Asperger syndrome    • Chronic gastritis    • Delayed gastric emptying    • Diarrhea    • Dysphagia     MOTHER REPORTS PATIENT HAS RECENTLY HAS GOTTEN CHOKED EASILY AND GETS FOOD STUCK IN THROAT.   • Epigastric abdominal pain    • Esophagitis    • GERD (gastroesophageal reflux disease)    • H/O seasonal allergies    • Hearing loss     Patient's mother reported more loss on the left side but that patient has loss bilateral.  No use of hearing devices.    • Obesity    • Wears glasses      Past Surgical History:   Procedure Laterality Date   • EAR TUBES     • ENDOSCOPY N/A 7/15/2017    Procedure: ESOPHAGOGASTRODUODENOSCOPY with bx , removal foreign body;  Surgeon: Saroj Scott MD;  Location: [facility] OR;  Service:    • ENDOSCOPY N/A 8/24/2017    Procedure: ESOPHAGOGASTRODUODENOSCOPY ESOPHAGEAL DILATATION;  Surgeon: Saroj Scott MD;  Location: [facility] ENDOSCOPY;  Service:    • ENDOSCOPY N/A 4/9/2019    Procedure: ESOPHAGOGASTRODUODENOSCOPY FOR FOREIGN BODY, biopsy, and esophageal dilitation;  Surgeon: Tyrone Herzog MD;  Location: [facility] ENDOSCOPY;  Service: Gastroenterology   • ENDOSCOPY N/A 5/14/2019    Procedure: ESOPHAGOGASTRODUODENOSCOPY with biopsy;  Surgeon: Tyrone Herzog MD;  Location: [facility] ENDOSCOPY;  Service: Gastroenterology   • ENDOSCOPY N/A 3/5/2020    Procedure: ESOPHAGOGASTRODUODENOSCOPY;  Surgeon: Tyrone Herzog MD;  Location: [facility] ENDOSCOPY;  Service: Gastroenterology;  Laterality: N/A;   • UPPER GASTROINTESTINAL ENDOSCOPY  04/09/2019   • UPPER GASTROINTESTINAL ENDOSCOPY  05/14/2019     Family History   Problem Relation Age of Onset   • No Known Problems Mother    • No Known Problems Father    • Colon cancer Neg Hx      Social History     Tobacco Use   • Smoking status: Never Smoker   • Smokeless tobacco: Never Used    Substance Use Topics   • Alcohol use: No       Current Outpatient Medications:   •  metoclopramide (REGLAN) 5 MG tablet, Take 0.5 tablets by mouth 2 (Two) Times a Day Before Meals., Disp: 30 tablet, Rfl: 1  •  pantoprazole (PROTONIX) 40 MG EC tablet, TAKE 1 TABLET BY MOUTH ONCE DAILY 30 MINUTES BEFORE BREAKFAST, Disp: 90 tablet, Rfl: 0    No Known Allergies    Physical Exam   Constitutional: He is oriented to person, place, and time. No distress.   HENT:   Head: Normocephalic and atraumatic.   Nose: Nose normal.   Mouth/Throat: Mouth/Lips are normal.  Eyes: Pupils are equal, round, and reactive to light. Lids are normal.   Pulmonary/Chest: Effort normal.  No respiratory distress.  Abdominal: Abdomen appears normal.   Neurological: He is alert and oriented to person, place, and time.   Skin: Skin is intact. His skin appears normal.  Psychiatric: He has a normal mood and affect. His speech is normal and behavior is normal. Judgment and thought content normal.      Laboratory Tests:   Upon review of records:     Dated 12/10/2019 glucose 98 BUN 10 creatinine 1.07 sodium 141 potassium 4.0 albumin 4.5 ALT 17 AST 12 alkaline phosphatase 81 total bilirubin 0.4 WBC 8.71 hemoglobin 16 hematocrit 47.1 platelet count 252 MCV 92.2 TSH 2.810, IgA 262, tTg IgA <2     Abdominal Imaging:  Upon review of records:     Gallbladder ultrasound dated 12/10/2019 reveals Limited images of the pancreas are unremarkable. The liver parenchyma is normal in echogenicity. The gallbladder is normal with no shadowing stones or wall thickening. There is no pericholecystic fluid. The common duct measures 3.70 mm. Limited images of the right kidney are unremarkable.     Procedures:  Upon review of records:     Dated April 9, 2019 the patient underwent an upper endoscopy which revealed: Esophageal foreign body.  Status post removal.  Tight esophageal concentric rings.  Status post serial dilation to 12 mm.  Erosive distal esophagitis.  LA class A.   Small sliding hiatal hernia less than 3 cm.  Erythematous-erosive gastritis.  10 x 5 mm clean-based prepyloric ulcer.  Changes seen within the esophagus are suggestive of eosinophilic esophagitis.  Stomach biopsy revealed antral mucosa with reactive gastropathy and mild to moderate chronic inflammation.  Negative for Helicobacter pylori by immunohistochemical stain.  Negative for intestinal metaplasia, dysplasia or malignancy.  Esophagus, biopsy revealed squamous mucosa with reactive changes, intraepithelial eosinophils and surface erosion.  Rare fungal organisms morphologically consistent with Candida species identified by PAS special stain.  Negative for glandular mucosa, intestinal metaplasia, dysplasia or malignancy.  Sections of the mid and distal esophageal biopsy show squamous mucosa with marked spongiosis, expansion of the basal layer, and mixed inflammatory infiltrate with neutrophils and eosinophils (up to 25 intraepithelial eosinophils per high magnification field).  There are focal eosinophilic microabscesses, and the superficial mucosa shows areas of erosion. A PAS special stain shows rare fungal organisms morphologically consistent with Candida species. The differential diagnosis for this degree of eosinophilia and histologic changes include eosinophilic esophagitis, proton pump inhibitor therapy response and esophageal eosinophilia, and gastroesophageal reflux. Clinical and endoscopic correlation is recommended.     EGD dated 5/14/2019 reveals erythematous distal esophagitis.  Erythematous gastritis. Changes consistent with delayed gastric emptying; partly digested food residue within stomach, patient nothing by mouth for 10 hours, and no gastric outlet obstruction.  Small sliding hiatal hernia less than 3 cm.  Antrum biopsy reveals mild reactive gastropathy with focal superficial vascular congestion. Negative for H. pylori, intestinal metaplasia, dysplasia and malignancy.    EGD dated 3/5/2020  reveals erythematous distal esophagitis.  Subtle concentric rings involving the mid and distal esophagus.  Early stricturing of the distal esophagus.  Erythematous gastritis.  Changes consistent with delayed gastric emptying; partly digested food residue within stomach, patient nothing by mouth for 10 hours, and no gastric outlet obstruction.  Small sliding hiatal hernia less than 3 cm.  No intervention in terms of dilatation of the esophagus was undertaken in view of presence of significant amount of partly digested food residue. Gastric antrum biopsy reveals reactive gastropathy with mild chronic gastritis with lymphoid aggregate and single granuloma.  H. pylori negative.  The presence of granulomas is of uncertain significance.  It is a negative granuloma so the likelihood of sarcoidosis is very low.  Crohn's disease may occasionally show small granulomatous throughout the GI tract.  If the patient has GI symptoms and/or diarrhea, lower endoscopy to rule out idiopathic inflammatory bowel disease is suggested.    Assessment:      ICD-10-CM ICD-9-CM   1. Dysphagia, unspecified type R13.10 787.20   2. Epigastric pain R10.13 789.06   3. Heartburn R12 787.1   4. Delayed gastric emptying K30 536.8     Plan/  Patient Instructions   1. Antireflux measures: Avoid fried, fatty foods, alcohol, chocolate, coffee, tea,  soft drinks, peppermint and spearmint, spicy foods, tomatoes and tomato based foods, onion based foods, and smoking. Other antireflux measures include weight reduction if overweight, avoiding tight clothing around the abdomen, elevating the head of the bed 6 inches with blocks under the head board, and don't drink or eat before going to bed and avoid lying down immediately after meals.  2. Pantoprazole 40 mg 1 by mouth in the am 30 minutes before breakfast.  3. Metoclopramide 5 mg 1/2 tablet by mouth three times a day, preferably 30 minutes before meals. Discussed short and long term side effects. The patient  verbalizes understanding.   4. The patient should eat very small meals throughout the day. Avoid large meals.  5. It is recommended to eat a softer diet. Meats are best consumed ground. Fruits and vegetables are best consumed cooked or steamed and then mashed.   6. Dietary instructions: The patient should eat in upright position and chew well.  The patient should drink water after 2-3 bites and take medications with liberal amounts of water.  Generally medications that have a potential to cause pill esophagitis may be avoided or used in an alternative form (for example if the patient needs potassium it may be used in a liquid rather than pill form).  Furthermore after eating and taking medications, the patient should remain in upright position for 5-10 minutes.  7. High fiber, low fat diet with liberal water intake.   8. Upper endoscopy-EGD: Description of the procedure, risks, benefits, alternatives and options, including nonoperative options, were discussed with the patient in detail. The patient understands and wishes to proceed. The patient has been advised to eat a soft diet and no solids by mouth after 6 pm on the day prior to the EGD. The patient's mother will call back this week to schedule procedure.   9. COVID-19 testing prior to procedure. The patient will need to self-quarantine after testing until the procedure. Instructions to be given to patient.    This was an audio and video enabled telemedicine encounter. The patient's mother was present during video visit.     SHARATH Vera

## 2020-05-29 ENCOUNTER — TELEPHONE (OUTPATIENT)
Dept: GASTROENTEROLOGY | Facility: CLINIC | Age: 25
End: 2020-05-29

## 2020-05-29 NOTE — TELEPHONE ENCOUNTER
----- Message from Ingris Keita MA sent at 5/28/2020  3:41 PM EDT -----  Patient's mom left voicemail asking for a return call. She didn't give reason why.    Riki Rock [phone]

## 2020-06-09 ENCOUNTER — PREP FOR SURGERY (OUTPATIENT)
Dept: OTHER | Facility: HOSPITAL | Age: 25
End: 2020-06-09

## 2020-06-09 DIAGNOSIS — R13.10 DYSPHAGIA, UNSPECIFIED TYPE: Primary | ICD-10-CM

## 2020-06-09 DIAGNOSIS — K30 DELAYED GASTRIC EMPTYING: ICD-10-CM

## 2020-06-09 DIAGNOSIS — R12 HEARTBURN: ICD-10-CM

## 2020-06-09 DIAGNOSIS — R10.13 EPIGASTRIC PAIN: ICD-10-CM

## 2020-06-09 DIAGNOSIS — Z01.812 PRE-PROCEDURE LAB EXAM: Primary | ICD-10-CM

## 2020-06-09 RX ORDER — SODIUM CHLORIDE 9 MG/ML
70 INJECTION, SOLUTION INTRAVENOUS CONTINUOUS PRN
Status: CANCELLED | OUTPATIENT
Start: 2020-06-09

## 2020-06-15 ENCOUNTER — LAB (OUTPATIENT)
Dept: LAB | Facility: HOSPITAL | Age: 25
End: 2020-06-15

## 2020-06-15 DIAGNOSIS — Z01.812 PRE-PROCEDURE LAB EXAM: ICD-10-CM

## 2020-06-15 PROCEDURE — U0002 COVID-19 LAB TEST NON-CDC: HCPCS

## 2020-06-15 PROCEDURE — C9803 HOPD COVID-19 SPEC COLLECT: HCPCS

## 2020-06-15 PROCEDURE — U0004 COV-19 TEST NON-CDC HGH THRU: HCPCS

## 2020-06-17 LAB
REF LAB TEST METHOD: NORMAL
SARS-COV-2 RNA RESP QL NAA+PROBE: NOT DETECTED

## 2020-06-18 ENCOUNTER — HOSPITAL ENCOUNTER (OUTPATIENT)
Facility: HOSPITAL | Age: 25
Setting detail: HOSPITAL OUTPATIENT SURGERY
Discharge: HOME | End: 2020-06-18
Attending: INTERNAL MEDICINE | Admitting: INTERNAL MEDICINE

## 2020-06-18 ENCOUNTER — ANESTHESIA (OUTPATIENT)
Dept: GASTROENTEROLOGY | Facility: HOSPITAL | Age: 25
End: 2020-06-18

## 2020-06-18 ENCOUNTER — ANESTHESIA EVENT (OUTPATIENT)
Dept: GASTROENTEROLOGY | Facility: HOSPITAL | Age: 25
End: 2020-06-18

## 2020-06-18 VITALS
SYSTOLIC BLOOD PRESSURE: 130 MMHG | TEMPERATURE: 97.8 F | HEART RATE: 87 BPM | RESPIRATION RATE: 15 BRPM | DIASTOLIC BLOOD PRESSURE: 89 MMHG | HEIGHT: 71 IN | WEIGHT: 220 LBS | OXYGEN SATURATION: 100 % | BODY MASS INDEX: 30.8 KG/M2

## 2020-06-18 DIAGNOSIS — R10.13 EPIGASTRIC PAIN: ICD-10-CM

## 2020-06-18 DIAGNOSIS — R12 HEARTBURN: ICD-10-CM

## 2020-06-18 DIAGNOSIS — K30 DELAYED GASTRIC EMPTYING: ICD-10-CM

## 2020-06-18 DIAGNOSIS — R13.10 DYSPHAGIA, UNSPECIFIED TYPE: ICD-10-CM

## 2020-06-18 PROCEDURE — C1726 CATH, BAL DIL, NON-VASCULAR: HCPCS | Performed by: INTERNAL MEDICINE

## 2020-06-18 PROCEDURE — 25010000002 MIDAZOLAM PER 1MG: Performed by: NURSE ANESTHETIST, CERTIFIED REGISTERED

## 2020-06-18 PROCEDURE — 43249 ESOPH EGD DILATION <30 MM: CPT | Performed by: INTERNAL MEDICINE

## 2020-06-18 PROCEDURE — 25010000002 PROPOFOL 10 MG/ML EMULSION: Performed by: NURSE ANESTHETIST, CERTIFIED REGISTERED

## 2020-06-18 PROCEDURE — 43239 EGD BIOPSY SINGLE/MULTIPLE: CPT | Performed by: INTERNAL MEDICINE

## 2020-06-18 PROCEDURE — 25010000002 FENTANYL CITRATE (PF) 100 MCG/2ML SOLUTION: Performed by: NURSE ANESTHETIST, CERTIFIED REGISTERED

## 2020-06-18 PROCEDURE — 88305 TISSUE EXAM BY PATHOLOGIST: CPT | Performed by: INTERNAL MEDICINE

## 2020-06-18 RX ORDER — SODIUM CHLORIDE 0.9 % (FLUSH) 0.9 %
10 SYRINGE (ML) INJECTION AS NEEDED
Status: DISCONTINUED | OUTPATIENT
Start: 2020-06-18 | End: 2020-06-18 | Stop reason: HOSPADM

## 2020-06-18 RX ORDER — MIDAZOLAM HYDROCHLORIDE 2 MG/2ML
INJECTION, SOLUTION INTRAMUSCULAR; INTRAVENOUS AS NEEDED
Status: DISCONTINUED | OUTPATIENT
Start: 2020-06-18 | End: 2020-06-18 | Stop reason: SURG

## 2020-06-18 RX ORDER — SODIUM CHLORIDE 9 MG/ML
70 INJECTION, SOLUTION INTRAVENOUS CONTINUOUS PRN
Status: DISCONTINUED | OUTPATIENT
Start: 2020-06-18 | End: 2020-06-18 | Stop reason: HOSPADM

## 2020-06-18 RX ORDER — SODIUM CHLORIDE 9 MG/ML
INJECTION, SOLUTION INTRAVENOUS CONTINUOUS PRN
Status: DISCONTINUED | OUTPATIENT
Start: 2020-06-18 | End: 2020-06-18 | Stop reason: SURG

## 2020-06-18 RX ORDER — LIDOCAINE HYDROCHLORIDE 20 MG/ML
INJECTION, SOLUTION INTRAVENOUS AS NEEDED
Status: DISCONTINUED | OUTPATIENT
Start: 2020-06-18 | End: 2020-06-18 | Stop reason: SURG

## 2020-06-18 RX ORDER — FENTANYL CITRATE 50 UG/ML
INJECTION, SOLUTION INTRAMUSCULAR; INTRAVENOUS AS NEEDED
Status: DISCONTINUED | OUTPATIENT
Start: 2020-06-18 | End: 2020-06-18 | Stop reason: SURG

## 2020-06-18 RX ORDER — SIMETHICONE 20 MG/.3ML
EMULSION ORAL AS NEEDED
Status: DISCONTINUED | OUTPATIENT
Start: 2020-06-18 | End: 2020-06-18 | Stop reason: HOSPADM

## 2020-06-18 RX ORDER — PROPOFOL 10 MG/ML
VIAL (ML) INTRAVENOUS AS NEEDED
Status: DISCONTINUED | OUTPATIENT
Start: 2020-06-18 | End: 2020-06-18 | Stop reason: SURG

## 2020-06-18 RX ORDER — KETAMINE HCL IN NACL, ISO-OSM 100MG/10ML
SYRINGE (ML) INJECTION AS NEEDED
Status: DISCONTINUED | OUTPATIENT
Start: 2020-06-18 | End: 2020-06-18 | Stop reason: SURG

## 2020-06-18 RX ADMIN — MIDAZOLAM HYDROCHLORIDE 2 MG: 1 INJECTION, SOLUTION INTRAMUSCULAR; INTRAVENOUS at 08:46

## 2020-06-18 RX ADMIN — SODIUM CHLORIDE 70 ML/HR: 9 INJECTION, SOLUTION INTRAVENOUS at 07:25

## 2020-06-18 RX ADMIN — PROPOFOL 30 MG: 10 INJECTION, EMULSION INTRAVENOUS at 09:02

## 2020-06-18 RX ADMIN — PROPOFOL 30 MG: 10 INJECTION, EMULSION INTRAVENOUS at 09:08

## 2020-06-18 RX ADMIN — PROPOFOL 30 MG: 10 INJECTION, EMULSION INTRAVENOUS at 09:07

## 2020-06-18 RX ADMIN — PROPOFOL 30 MG: 10 INJECTION, EMULSION INTRAVENOUS at 09:05

## 2020-06-18 RX ADMIN — PROPOFOL 30 MG: 10 INJECTION, EMULSION INTRAVENOUS at 09:09

## 2020-06-18 RX ADMIN — FENTANYL CITRATE 50 MCG: 50 INJECTION, SOLUTION INTRAMUSCULAR; INTRAVENOUS at 08:58

## 2020-06-18 RX ADMIN — Medication 25 MG: at 08:58

## 2020-06-18 RX ADMIN — LIDOCAINE HYDROCHLORIDE 40 MG: 20 INJECTION, SOLUTION INTRAVENOUS at 08:58

## 2020-06-18 RX ADMIN — PROPOFOL 50 MG: 10 INJECTION, EMULSION INTRAVENOUS at 08:59

## 2020-06-18 RX ADMIN — SODIUM CHLORIDE: 9 INJECTION, SOLUTION INTRAVENOUS at 08:35

## 2020-06-18 RX ADMIN — FENTANYL CITRATE 50 MCG: 50 INJECTION, SOLUTION INTRAMUSCULAR; INTRAVENOUS at 08:59

## 2020-06-18 RX ADMIN — LIDOCAINE HYDROCHLORIDE 40 MG: 20 INJECTION, SOLUTION INTRAVENOUS at 08:59

## 2020-06-18 NOTE — INTERVAL H&P NOTE
"  H&P reviewed. The patient was examined and there are no changes to the H&P.       No recent shortness of breath or chest pains.      Blood pressure 135/93, pulse 82, temperature 98.2 °F (36.8 °C), temperature source Temporal, resp. rate 18, height 180.3 cm (71\"), weight 99.8 kg (220 lb), SpO2 97 %.    Chest: clear to auscultation.  Cardiac exam: No S3 no murmurs.   Abdomen: soft bowel sounds present nondistended nontender.        "

## 2020-06-18 NOTE — OP NOTE
PROCEDURE:  Upper Endoscopy with serial dilation of the distal esophagus to 11 mm using CRE balloon and biopsies.    DATE OF PROCEDURE: June 18, 2020.    REFERRING PROVIDER:  Silvano Cazares MD.     INSTRUMENT: Olympus GIF H 190 video endoscope     INDICATIONS OF THE PROCEDURE:   This is a 25-year-old white male with history of recurrent dysphagia, epigastric abdominal pain, reflux and history of delayed gastric emptying.  Currently undergoing upper endoscopy for further evaluation.     BIOPSIES: Second portion of duodenum.  Gastric antrum, body and fundus.  Mid and distal esophagus.     MEDICATIONS:  MAC.     PHOTOGRAPHS:  Photographs were included in the medical records.     CONSENT/PREPROCEDURE EVALUATION:  Risks, benefits, alternatives and options of the procedure including risks of anesthesia/sedation were discussed and informed consent was obtained prior to the procedure. History and physical examination were performed and nothing precluded the test.     REPORT:  The patient was placed in left lateral decubitus position. Once under the influence of IV sedation, the instrument was inserted into the mouth and esophagus was intubated under direct vision without difficulty.     Esophagus:  Z line was noted to be around 40 cm.  Multiple concentric rings were noted within the mid and distal esophagus.  There appeared to be rather tight.  Erythematous distal esophagitis was seen.  Biopsies were obtained in these areas.  A small sliding hiatal hernia less than 3 cm was noted.  No Liriano's esophagus was seen.     Stomach:  Antrum:  Erythematous-erosive gastritis.  Angulus, lesser and greater curves: Normal.  Retroflex examination: Sliding hiatal hernia.  Cardia and fundus:  Normal.     Body of the stomach: Erythematous gastritis.  Good distensibility of the stomach was achieved no giant folds were noted.   Biopsies were obtained from the gastric antrum,  body and fundus.    Pylorus and pyloric channel:  normal.      Duodenum:  Bulb: Normal.  Second portion: normal.  No scalloping was seen in the second portion of duodenum.  Biopsies were obtained from the second portion of duodenum.    Intervention: Distal and mid esophageal areas were dilated using 10-11-12 millimeters CRE balloon serially to 11 mm.  Small mucosal tears were noted.  No active bleeding was seen.    The upper GI tract was decompressed and the scope was pulled out of the patient. The patient tolerated the procedure well.     DIAGNOSES:     1. Esophageal rings.  Status post serial dilation to 11 mm.  2. Small sliding hiatal hernia less than 3 cm.  3. Erythematous distal esophagitis.  4. Erythematous-erosive gastritis.    RECOMMENDATIONS:  1.  Dietary instructions.  2.  Pantoprazole 40 mg 1 p.o. q.a.m. 1/2 hour before breakfast.  3.  Follow biopsies.  4.  Follow up in office.  5.  Reglan (metoclopramide) 5 mg tablets.  Take one half tablet (2.5 mg) by mouth in the morning and in the evening (2 times daily preferably half an hour before food).     Thank you very much for letting me participate in the care of this patient. Please do not hesitate to call me if you have any questions.

## 2020-06-18 NOTE — ANESTHESIA POSTPROCEDURE EVALUATION
Patient: Crow Rock    Procedure Summary     Date:  06/18/20 Room / Location:  [facility] ENDOSCOPY 2 / [facility] ENDOSCOPY    Anesthesia Start:  0844 Anesthesia Stop:      Procedure:  ESOPHAGOGASTRODUODENOSCOPY (N/A Esophagus) Diagnosis:       Dysphagia, unspecified type      Epigastric pain      Heartburn      Delayed gastric emptying      (Dysphagia, unspecified type [R13.10])      (Epigastric pain [R10.13])      (Heartburn [R12])      (Delayed gastric emptying [K30])    Surgeon:  Tyrone Herzog MD Provider:  Flaco Crow CRNA    Anesthesia Type:  MAC ASA Status:  2          Anesthesia Type: MAC    Vitals  No vitals data found for the desired time range.          Post Anesthesia Care and Evaluation    Patient location during evaluation: PHASE II  Patient participation: complete - patient participated  Level of consciousness: awake  Pain score: 0  Pain management: adequate  Airway patency: patent  Anesthetic complications: No anesthetic complications  PONV Status: none  Cardiovascular status: acceptable  Respiratory status: acceptable and nasal cannula  Hydration status: acceptable    Comments: vsss resp spont, reflexes intact, responsive, report given to pacu nurse

## 2020-06-18 NOTE — DISCHARGE INSTRUCTIONS
Postprocedure instructions:  1. Nothing by mouth until fully alert and as specified below .  2. Bedrest until fully alert.  3. Vital signs as routine.    Diet:   1. Nothing by mouth for 60 minutes, then if no chest pains the patient may have Clear liquids diet (No Sodas) for 4 hours.  2. May advance to soft diet in 6 hours if no chest pains, Fever or chills, nausea vomiting or bleeding.    Other instructions:  1. The patient may eat in upright position, chew well, take small bites, eat soft food and take medications in upright position.   2. The patient should drink water after 3-4 bites, and liberally with medications.    3. The patient should remain upright for about 10 minutes after eating and taking oral medications.    Blood Thinner and other medications Directions:  Avoid Aspirin & other NSAIDS for 3 days.  Tylenol is okay.    Other instructions:  The patient should avoid medications that have a potential to cause pill esophagitis including potassium pills, tetracycline capsules, iron pills, NSAIDs and bisphosphonates.    Follow-up:    DR. RICK HERZOG in 1 week.Office phone # [phone].  ****************************************************************************************************    Notes to the patient and the family from Dr. Herzog.     Dear patient/family member,    Findings on today's procedure are as follows:    1. Esophagitis. Inflammation of the esophagus.  2. Esophageal rings. These areas were stretched.   3. Inflammation of the stomach.  Erosive gastritis.  4. Small sliding hiatal hernia.  5. No cancer. No active ulcers.     Recommendations:    1. Protonix (Pantoprazole) tablet 40 mg tablet. Take 1 tablet orally in the morning half an hour before eating every day.  2. Reglan (metoclopramide) 5 mg tablets.  Take one half tablet (2.5 mg) by mouth in the morning and in the evening (2 times daily preferably half an hour before food).  3. Other instructions as above.  4. Although, your food  pipe has been stretched however, this is not yet up to the mala.  You should continue to take precautions when you eat.    Should you have more questions please do not hesitate to talk to the nurse who can call me and let me talk to you.     I hope you feel better.    Tyrone Herzog M.D., FACP, FACG.

## 2020-06-23 LAB
LAB AP CASE REPORT: NORMAL
PATH REPORT.FINAL DX SPEC: NORMAL

## 2020-06-29 ENCOUNTER — OFFICE VISIT (OUTPATIENT)
Dept: GASTROENTEROLOGY | Facility: CLINIC | Age: 25
End: 2020-06-29

## 2020-06-29 VITALS
WEIGHT: 220 LBS | RESPIRATION RATE: 12 BRPM | TEMPERATURE: 99.1 F | HEART RATE: 72 BPM | DIASTOLIC BLOOD PRESSURE: 85 MMHG | SYSTOLIC BLOOD PRESSURE: 129 MMHG | HEIGHT: 72 IN | BODY MASS INDEX: 29.8 KG/M2

## 2020-06-29 DIAGNOSIS — K20.0 ESOPHAGITIS, EOSINOPHILIC: ICD-10-CM

## 2020-06-29 DIAGNOSIS — R13.19 OTHER DYSPHAGIA: ICD-10-CM

## 2020-06-29 DIAGNOSIS — R12 HEARTBURN: Primary | ICD-10-CM

## 2020-06-29 DIAGNOSIS — K30 DELAYED GASTRIC EMPTYING: ICD-10-CM

## 2020-06-29 PROCEDURE — 99214 OFFICE O/P EST MOD 30 MIN: CPT | Performed by: INTERNAL MEDICINE

## 2020-08-06 DIAGNOSIS — R12 HEARTBURN: Chronic | ICD-10-CM

## 2020-08-07 RX ORDER — PANTOPRAZOLE SODIUM 40 MG/1
TABLET, DELAYED RELEASE ORAL
Qty: 90 TABLET | Refills: 0 | Status: SHIPPED | OUTPATIENT
Start: 2020-08-07 | End: 2021-02-01 | Stop reason: SDUPTHER

## 2020-08-27 ENCOUNTER — OFFICE VISIT (OUTPATIENT)
Dept: GASTROENTEROLOGY | Facility: CLINIC | Age: 25
End: 2020-08-27

## 2020-08-27 VITALS
BODY MASS INDEX: 29.26 KG/M2 | WEIGHT: 216 LBS | HEIGHT: 72 IN | SYSTOLIC BLOOD PRESSURE: 129 MMHG | DIASTOLIC BLOOD PRESSURE: 87 MMHG | TEMPERATURE: 99.6 F | RESPIRATION RATE: 16 BRPM | HEART RATE: 66 BPM

## 2020-08-27 DIAGNOSIS — K20.0 ESOPHAGITIS, EOSINOPHILIC: Chronic | ICD-10-CM

## 2020-08-27 DIAGNOSIS — K31.84 GASTROPARESIS: Chronic | ICD-10-CM

## 2020-08-27 DIAGNOSIS — K21.00 GASTROESOPHAGEAL REFLUX DISEASE WITH ESOPHAGITIS: Primary | Chronic | ICD-10-CM

## 2020-08-27 PROCEDURE — 99214 OFFICE O/P EST MOD 30 MIN: CPT | Performed by: NURSE PRACTITIONER

## 2020-08-27 NOTE — PROGRESS NOTES
Follow Up Note     Date: 2020   Patient Name: Crow Rock  MRN: [id number]  : 1995     Primary Care Provider: Silvano Cazares MD     Chief Complaint:    Chief Complaint   Patient presents with   • Follow-up     History of present illness:   2020  Crow Rock is a 25 y.o. male who is here today for follow up for difficulty swallowing. He has been doing very well. He has not had any further episodes of difficulty swallowing. Heartburn is well controlled with Pantoprazole. He is no longer taking Metoclopramide. The patient denies constipation, diarrhea or rectal bleeding.    Interval History:  2020    The patient has a history of reflux off and on for the last several years.  The reflux is moderate to severe.  Symptoms are described as retrosternal burning sensation, and indigestion.  There is history of frequent regurgitative symptoms.  Frequency being several times per week.  The symptoms are worse at night.  The patient takes acid suppressive therapy with reasonable control of his symptoms.  His dysphagia has significantly improved.  The patient denies further dysphagia symptoms.  He denies abdominal pain.  There is no nausea or vomiting.  Currently, the patient denies diarrhea or constipation.    Subjective      Past Medical History:   Diagnosis Date   • Asperger syndrome    • Chronic gastritis    • Delayed gastric emptying    • Diarrhea    • Dysphagia     MOTHER REPORTS PATIENT HAS RECENTLY HAS GOTTEN CHOKED EASILY AND GETS FOOD STUCK IN THROAT.   • Epigastric abdominal pain    • Esophagitis    • GERD (gastroesophageal reflux disease)    • H/O seasonal allergies    • Hearing loss     Patient's mother reported more loss on the left side but that patient has loss bilateral.  No use of hearing devices.    • Obesity    • Wears glasses      Past Surgical History:   Procedure Laterality Date   • EAR TUBES     • ENDOSCOPY N/A 7/15/2017    Procedure: ESOPHAGOGASTRODUODENOSCOPY  with bx , removal foreign body;  Surgeon: Saroj Scott MD;  Location: [facility] OR;  Service:    • ENDOSCOPY N/A 8/24/2017    Procedure: ESOPHAGOGASTRODUODENOSCOPY ESOPHAGEAL DILATATION;  Surgeon: Saroj Scott MD;  Location: [facility] ENDOSCOPY;  Service:    • ENDOSCOPY N/A 4/9/2019    Procedure: ESOPHAGOGASTRODUODENOSCOPY FOR FOREIGN BODY, biopsy, and esophageal dilitation;  Surgeon: Tyrone Herzog MD;  Location: [facility] ENDOSCOPY;  Service: Gastroenterology   • ENDOSCOPY N/A 5/14/2019    Procedure: ESOPHAGOGASTRODUODENOSCOPY with biopsy;  Surgeon: Tyrone Herzog MD;  Location: [facility] ENDOSCOPY;  Service: Gastroenterology   • ENDOSCOPY N/A 3/5/2020    Procedure: ESOPHAGOGASTRODUODENOSCOPY;  Surgeon: Tyrone Herzog MD;  Location: [facility] ENDOSCOPY;  Service: Gastroenterology;  Laterality: N/A;   • ENDOSCOPY N/A 6/18/2020    Procedure: ESOPHAGOGASTRODUODENOSCOPY;  Surgeon: Tyrone Herzog MD;  Location: [facility] ENDOSCOPY;  Service: Gastroenterology;  Laterality: N/A;   • UPPER GASTROINTESTINAL ENDOSCOPY  04/09/2019   • UPPER GASTROINTESTINAL ENDOSCOPY  05/14/2019     Family History   Problem Relation Age of Onset   • No Known Problems Mother    • No Known Problems Father    • Colon cancer Neg Hx      Social History     Socioeconomic History   • Marital status: Single     Spouse name: Not on file   • Number of children: Not on file   • Years of education: Not on file   • Highest education level: Not on file   Tobacco Use   • Smoking status: Never Smoker   • Smokeless tobacco: Never Used   Substance and Sexual Activity   • Alcohol use: No   • Drug use: No   • Sexual activity: Defer       Current Outpatient Medications:   •  pantoprazole (PROTONIX) 40 MG EC tablet, TAKE 1 TABLET BY MOUTH EVERY DAY 30 MINUTES BEFORE BREAKFAST, Disp: 90 tablet, Rfl: 0     No Known Allergies     Review of Systems   Constitutional: Negative for appetite change and unexpected weight loss.   HENT: Negative for trouble swallowing.    Eyes:  Negative for blurred vision.   Respiratory: Negative for choking and chest tightness.    Cardiovascular: Negative for leg swelling.   Gastrointestinal: Positive for GERD. Negative for abdominal distention, abdominal pain, anal bleeding, blood in stool, constipation, diarrhea, nausea, rectal pain, vomiting and indigestion.   Endocrine: Negative for polyphagia.   Genitourinary: Negative for hematuria.   Musculoskeletal: Negative for arthralgias and myalgias.   Skin: Negative for rash.   Allergic/Immunologic: Negative for food allergies.   Neurological: Negative for dizziness, syncope and confusion.   Hematological: Does not bruise/bleed easily.   Psychiatric/Behavioral: Negative for depressed mood.      The following portions of the patient's history were reviewed and updated as appropriate: allergies, current medications, past family history, past medical history, past social history, past surgical history and problem list.    Objective     Physical Exam   Constitutional: He is oriented to person, place, and time. He appears well-developed and well-nourished. No distress.   HENT:   Head: Normocephalic and atraumatic.   Right Ear: Hearing and external ear normal.   Left Ear: Hearing and external ear normal.   Nose: Nose normal.   Mouth/Throat: Oropharynx is clear and moist and mucous membranes are normal. Mucous membranes are not pale, not dry and not cyanotic. No oral lesions. No oropharyngeal exudate.   Eyes: Conjunctivae and EOM are normal. Right eye exhibits no discharge. Left eye exhibits no discharge.   Neck: Trachea normal. Neck supple. No JVD present. No edema present. No thyroid mass and no thyromegaly present.   Cardiovascular: Normal rate, regular rhythm, S2 normal and normal heart sounds. Exam reveals no gallop, no S3 and no friction rub.   No murmur heard.  Pulmonary/Chest: Effort normal and breath sounds normal. No respiratory distress. He exhibits no tenderness.   Abdominal: Normal appearance and bowel  "sounds are normal. He exhibits no distension, no ascites and no mass. There is no splenomegaly or hepatomegaly. There is no tenderness. There is no rigidity, no rebound and no guarding. No hernia.     Vascular Status -  His right foot exhibits no edema. His left foot exhibits no edema.  Lymphadenopathy:     He has no cervical adenopathy.        Left: No supraclavicular adenopathy present.   Neurological: He is alert and oriented to person, place, and time. He has normal strength. No cranial nerve deficit or sensory deficit.   Skin: No rash noted. He is not diaphoretic. No cyanosis. No pallor. Nails show no clubbing.   Psychiatric: He has a normal mood and affect.   Nursing note and vitals reviewed.    Vitals:    08/27/20 1602   BP: 129/87   Pulse: 66   Resp: 16   Temp: 99.6 °F (37.6 °C)   Weight: 98 kg (216 lb)   Height: 182.9 cm (72\")     Results Review:   I reviewed the patient's new clinical results.    Admission on 06/18/2020, Discharged on 06/18/2020   Component Date Value Ref Range Status   • Case Report 06/18/2020    Final                    Value:Surgical Pathology Report                         Case: [id number]                                  Authorizing Provider:  Tyrone Herzog MD         Collected:           06/18/2020 09:02 AM          Ordering Location:     [facility]    Received:            06/18/2020 01:57 PM                                 [facility]                                                                    Pathologist:           Giuseppe Yun MD                                                             Specimens:   1) - Small Intestine, 2nd portion duodenum biopsy                                                   2) - Gastric, Antrum, antrum fundus and body biopsy                                                 3) - Esophagus, Mid, mid and distal biopsy                                                • Final Diagnosis 06/18/2020    Final                    Value:This result " contains rich text formatting which cannot be displayed here.   Lab on 06/15/2020   Component Date Value Ref Range Status   • COVID19 06/15/2020 Not Detected  Not Detected - Ref. Range Final      No radiology results for the last 90 days.     Gallbladder ultrasound dated 12/10/2019 reveals Limited images of the pancreas are unremarkable. The liver parenchyma is normal in echogenicity. The gallbladder is normal with no shadowing stones or wall thickening. There is no pericholecystic fluid. The common duct measures 3.70 mm. Limited images of the right kidney are unremarkable.     EGD dated 3/5/2020 reveals erythematous distal esophagitis.  Subtle concentric rings involving the mid and distal esophagus.  Early stricturing of the distal esophagus.  Erythematous gastritis.  Changes consistent with delayed gastric emptying; partly digested food residue within stomach, patient nothing by mouth for 10 hours, and no gastric outlet obstruction.  Small sliding hiatal hernia less than 3 cm.  No intervention in terms of dilatation of the esophagus was undertaken in view of presence of significant amount of partly digested food residue. Gastric antrum biopsy reveals reactive gastropathy with mild chronic gastritis with lymphoid aggregate and single granuloma.  H. pylori negative.  The presence of granulomas is of uncertain significance.  It is a negative granuloma so the likelihood of sarcoidosis is very low.  Crohn's disease may occasionally show small granulomatous throughout the GI tract.  If the patient has GI symptoms and/or diarrhea, lower endoscopy to rule out idiopathic inflammatory bowel disease is suggested.     Dated June 18, 2020 the patient underwent an upper endoscopy which revealed: Esophageal rings.  Status post serial dilation to 11 mm.  Small sliding hiatal hernia less than 3 cm.  Erythematous distal esophagitis.  Erythematous-erosive gastritis.  Second portion of duodenum, biopsy revealed duodenal type mucosa  with no significant histopathologic abnormalities.  Antrum, body and fundus, biopsies revealed gastric antral and fundic type mucosa with mild chronic inactive gastritis.  Negative for intestinal metaplasia or dysplasia.  No Helicobacter pylori-like organisms seen.  Mid and distal esophagus, biopsy revealed squamous mucosa with markedly increased intraepithelial eosinophils (greater than 30 eosinophils per high-power field), in the right clinical and endoscopic setting, this finding would be consistent with eosinophilic esophagitis.  Negative for glandular type mucosa, intestinal metaplasia, or dysplasia.  Negative for specific microorganisms.    Assessment / Plan      1. Gastroesophageal reflux disease with esophagitis  Reasonable control with PPI therapy. Will continue.    2. Gastroparesis  Changes associated with delayed gastric emptying per EGD. The patient is no longer taking Metoclopramide. He denies epigastric pain, nausea or vomiting.    3. Esophagitis, eosinophilic  Biopsies consistent with EOE per EGD in June 2020. The patient declines food allergy panel to eliminate food source causing EOE as he has a very limited diet due to food textures and he knows he will not eliminate any of those foods if they were positive. He does not want further treatment or evaluation at this time. He will consider in the future if he has return of difficulty swallowing.    Patient Instructions   1. Antireflux measures: Avoid fried, fatty foods, alcohol, chocolate, coffee, tea,  soft drinks, peppermint and spearmint, spicy foods, tomatoes and tomato based foods, onion based foods, and smoking. Other antireflux measures include weight reduction if overweight, avoiding tight clothing around the abdomen, elevating the head of the bed 6 inches with blocks under the head board, and don't drink or eat before going to bed and avoid lying down immediately after meals.  2. Pantoprazole 40 mg 1 by mouth in the am 30 minutes before  breakfast.  3. The patient should eat very small meals throughout the day. Avoid large meals.  4. It is recommended to eat a softer diet. Meats are best consumed ground. Fruits and vegetables are best consumed cooked or steamed and then mashed.   5. Dietary instructions.  The patient should eat in upright position and chew well.  The patient should drink water after 2-3 bites and take medications with liberal amounts of water.  Furthermore after eating and taking medications, the patient should remain in upright position for 5-10 minutes.  6. The patient may consider food allergy panel in the future.  7. Follow up: 6 months or sooner if any problems    The patient was seen along with his mother.    Cornell Ravi, APRN  8/27/2020    Please note that portions of this note may have been completed with a voice recognition program. Efforts were made to edit the dictations, but occasionally words are mistranscribed.

## 2020-08-27 NOTE — PATIENT INSTRUCTIONS
1. Antireflux measures: Avoid fried, fatty foods, alcohol, chocolate, coffee, tea,  soft drinks, peppermint and spearmint, spicy foods, tomatoes and tomato based foods, onion based foods, and smoking. Other antireflux measures include weight reduction if overweight, avoiding tight clothing around the abdomen, elevating the head of the bed 6 inches with blocks under the head board, and don't drink or eat before going to bed and avoid lying down immediately after meals.  2. Pantoprazole 40 mg 1 by mouth in the am 30 minutes before breakfast.  3. The patient should eat very small meals throughout the day. Avoid large meals.  4. It is recommended to eat a softer diet. Meats are best consumed ground. Fruits and vegetables are best consumed cooked or steamed and then mashed.   5. Dietary instructions.  The patient should eat in upright position and chew well.  The patient should drink water after 2-3 bites and take medications with liberal amounts of water.  Furthermore after eating and taking medications, the patient should remain in upright position for 5-10 minutes.  6. The patient may consider food allergy panel in the future.  7. Follow up: 6 months or sooner if any problems    The patient was seen along with his mother.

## 2021-02-01 DIAGNOSIS — K21.00 GASTROESOPHAGEAL REFLUX DISEASE WITH ESOPHAGITIS WITHOUT HEMORRHAGE: Primary | ICD-10-CM

## 2021-02-01 DIAGNOSIS — R12 HEARTBURN: Chronic | ICD-10-CM

## 2021-02-01 RX ORDER — PANTOPRAZOLE SODIUM 40 MG/1
TABLET, DELAYED RELEASE ORAL
Qty: 90 TABLET | Refills: 1 | Status: SHIPPED | OUTPATIENT
Start: 2021-02-01 | End: 2021-04-08 | Stop reason: SDUPTHER

## 2021-03-23 ENCOUNTER — BULK ORDERING (OUTPATIENT)
Dept: CASE MANAGEMENT | Facility: OTHER | Age: 26
End: 2021-03-23

## 2021-03-23 DIAGNOSIS — Z23 IMMUNIZATION DUE: ICD-10-CM

## 2021-04-08 ENCOUNTER — OFFICE VISIT (OUTPATIENT)
Dept: GASTROENTEROLOGY | Facility: CLINIC | Age: 26
End: 2021-04-08

## 2021-04-08 VITALS
HEART RATE: 75 BPM | SYSTOLIC BLOOD PRESSURE: 118 MMHG | DIASTOLIC BLOOD PRESSURE: 85 MMHG | HEIGHT: 72 IN | BODY MASS INDEX: 30.88 KG/M2 | TEMPERATURE: 98.6 F | RESPIRATION RATE: 18 BRPM | WEIGHT: 228 LBS

## 2021-04-08 DIAGNOSIS — R13.19 ESOPHAGEAL DYSPHAGIA: Primary | ICD-10-CM

## 2021-04-08 DIAGNOSIS — K20.0 ESOPHAGITIS, EOSINOPHILIC: ICD-10-CM

## 2021-04-08 DIAGNOSIS — Z01.812 PRE-PROCEDURE LAB EXAM: Primary | ICD-10-CM

## 2021-04-08 DIAGNOSIS — K21.00 GASTROESOPHAGEAL REFLUX DISEASE WITH ESOPHAGITIS WITHOUT HEMORRHAGE: ICD-10-CM

## 2021-04-08 DIAGNOSIS — T78.49XA OTHER ALLERGY, INITIAL ENCOUNTER: ICD-10-CM

## 2021-04-08 PROCEDURE — 99214 OFFICE O/P EST MOD 30 MIN: CPT | Performed by: NURSE PRACTITIONER

## 2021-04-08 RX ORDER — SODIUM CHLORIDE 9 MG/ML
70 INJECTION, SOLUTION INTRAVENOUS CONTINUOUS PRN
Status: CANCELLED | OUTPATIENT
Start: 2021-04-08

## 2021-04-08 RX ORDER — PANTOPRAZOLE SODIUM 40 MG/1
TABLET, DELAYED RELEASE ORAL
Qty: 90 TABLET | Refills: 1 | Status: ON HOLD | OUTPATIENT
Start: 2021-04-08 | End: 2021-05-11 | Stop reason: SDUPTHER

## 2021-04-08 NOTE — PROGRESS NOTES
Follow Up Note     Date: 2021   Patient Name: Crow Rock  MRN: [id number]  : 1995     Primary Care Provider: Sivlano Cazares MD     Chief Complaint:    Chief Complaint   Patient presents with   • Follow-up   • Med Refill     History of present illness:   2021  Crow Rock is a 25 y.o. male who is here today for follow up for med refill.    Over the past few months his difficulty swallowing has been worsening. He had been eating a few weeks ago and felt the food would not go down. After several hours, it finally passed. He has a history of EOE, but did not want treatment or food allergy testing after his last EGD in . Reflux well controlled with PPI. Reflux is severe if he tries to stop it. Denies abdominal pain or nausea. No constipation, diarrhea or rectal bleeding.    Interval History:  2020  Crow Rock is a 25 y.o. male who is here today for follow up for difficulty swallowing. He has been doing very well. He has not had any further episodes of difficulty swallowing. Heartburn is well controlled with Pantoprazole. He is no longer taking Metoclopramide. The patient denies constipation, diarrhea or rectal bleeding.     2020  The patient has a history of reflux off and on for the last several years.  The reflux is moderate to severe.  Symptoms are described as retrosternal burning sensation, and indigestion.  There is history of frequent regurgitative symptoms.  Frequency being several times per week.  The symptoms are worse at night.  The patient takes acid suppressive therapy with reasonable control of his symptoms.  His dysphagia has significantly improved.  The patient denies further dysphagia symptoms.  He denies abdominal pain.  There is no nausea or vomiting.  Currently, the patient denies diarrhea or constipation.    Subjective      Past Medical History:   Diagnosis Date   • Asperger syndrome    • Chronic gastritis    • Delayed gastric emptying     • Diarrhea    • Dysphagia     MOTHER REPORTS PATIENT HAS RECENTLY HAS GOTTEN CHOKED EASILY AND GETS FOOD STUCK IN THROAT.   • Epigastric abdominal pain    • Esophagitis    • GERD (gastroesophageal reflux disease)    • H/O seasonal allergies    • Hearing loss     Patient's mother reported more loss on the left side but that patient has loss bilateral.  No use of hearing devices.    • Obesity    • Wears glasses      Past Surgical History:   Procedure Laterality Date   • EAR TUBES     • ENDOSCOPY N/A 7/15/2017    Procedure: ESOPHAGOGASTRODUODENOSCOPY with bx , removal foreign body;  Surgeon: Saroj Scott MD;  Location: [facility] OR;  Service:    • ENDOSCOPY N/A 8/24/2017    Procedure: ESOPHAGOGASTRODUODENOSCOPY ESOPHAGEAL DILATATION;  Surgeon: Saroj Scott MD;  Location: [facility] ENDOSCOPY;  Service:    • ENDOSCOPY N/A 4/9/2019    Procedure: ESOPHAGOGASTRODUODENOSCOPY FOR FOREIGN BODY, biopsy, and esophageal dilitation;  Surgeon: Tyrone Herzog MD;  Location: [facility] ENDOSCOPY;  Service: Gastroenterology   • ENDOSCOPY N/A 5/14/2019    Procedure: ESOPHAGOGASTRODUODENOSCOPY with biopsy;  Surgeon: Tyrone Herzog MD;  Location: [facility] ENDOSCOPY;  Service: Gastroenterology   • ENDOSCOPY N/A 3/5/2020    Procedure: ESOPHAGOGASTRODUODENOSCOPY;  Surgeon: Tyrone Herzog MD;  Location: [facility] ENDOSCOPY;  Service: Gastroenterology;  Laterality: N/A;   • ENDOSCOPY N/A 6/18/2020    Procedure: ESOPHAGOGASTRODUODENOSCOPY;  Surgeon: Tyrone Herzog MD;  Location: [facility] ENDOSCOPY;  Service: Gastroenterology;  Laterality: N/A;   • UPPER GASTROINTESTINAL ENDOSCOPY  04/09/2019   • UPPER GASTROINTESTINAL ENDOSCOPY  05/14/2019     Family History   Problem Relation Age of Onset   • No Known Problems Mother    • No Known Problems Father    • Colon cancer Neg Hx      Social History     Socioeconomic History   • Marital status: Single     Spouse name: Not on file   • Number of children: Not on file   • Years of education: Not on file   •  Highest education level: Not on file   Tobacco Use   • Smoking status: Never Smoker   • Smokeless tobacco: Never Used   Vaping Use   • Vaping Use: Never used   Substance and Sexual Activity   • Alcohol use: No   • Drug use: No   • Sexual activity: Defer       Current Outpatient Medications:   •  pantoprazole (PROTONIX) 40 MG EC tablet, TAKE 1 TABLET BY MOUTH EVERY DAY 30 MINUTES BEFORE BREAKFAST, Disp: 90 tablet, Rfl: 1     No Known Allergies     The following portions of the patient's history were reviewed and updated as appropriate: allergies, current medications, past family history, past medical history, past social history, past surgical history and problem list.  Objective     Physical Exam  Vitals and nursing note reviewed.   Constitutional:       General: He is not in acute distress.     Appearance: Normal appearance. He is well-developed. He is not diaphoretic.   HENT:      Head: Normocephalic and atraumatic.      Right Ear: Hearing and external ear normal.      Left Ear: Hearing and external ear normal.      Nose: Nose normal.      Mouth/Throat:      Mouth: Mucous membranes are not pale, not dry and not cyanotic.   Eyes:      General: Lids are normal.         Right eye: No discharge.         Left eye: No discharge.      Conjunctiva/sclera: Conjunctivae normal.   Neck:      Thyroid: No thyroid mass or thyromegaly.      Vascular: No JVD.      Trachea: Trachea normal.   Cardiovascular:      Rate and Rhythm: Normal rate and regular rhythm.      Heart sounds: Normal heart sounds and S2 normal. No murmur heard.   No friction rub. No gallop. No S3 sounds.    Pulmonary:      Effort: Pulmonary effort is normal. No respiratory distress.      Breath sounds: Normal breath sounds.   Chest:      Chest wall: No tenderness.   Abdominal:      General: Bowel sounds are normal. There is no distension.      Palpations: Abdomen is soft. Abdomen is not rigid. There is no hepatomegaly, splenomegaly or mass.      Tenderness:  "There is no abdominal tenderness. There is no guarding or rebound.      Hernia: No hernia is present.   Musculoskeletal:      Cervical back: Neck supple. No edema.   Lymphadenopathy:      Cervical: No cervical adenopathy.      Upper Body:      Left upper body: No supraclavicular adenopathy.   Skin:     General: Skin is warm and dry.      Coloration: Skin is not pale.      Findings: No rash.      Nails: There is no clubbing.   Neurological:      Mental Status: He is alert and oriented to person, place, and time.      Cranial Nerves: No cranial nerve deficit.      Sensory: No sensory deficit.   Psychiatric:         Mood and Affect: Mood normal.         Speech: Speech normal.         Behavior: Behavior is cooperative.       Vitals:    04/08/21 1524   BP: 118/85   Pulse: 75   Resp: 18   Temp: 98.6 °F (37 °C)   Weight: 103 kg (228 lb)   Height: 182.9 cm (72\")     Results Review:   I reviewed the patient's new clinical results.    No visits with results within 90 Day(s) from this visit.   Latest known visit with results is:   Admission on 06/18/2020, Discharged on 06/18/2020   Component Date Value Ref Range Status   • Case Report 06/18/2020    Final                    Value:Surgical Pathology Report                         Case: [id number]                                  Authorizing Provider:  Tyrone Herzog MD         Collected:           06/18/2020 09:02 AM          Ordering Location:     [facility]    Received:            06/18/2020 01:57 PM                                 [facility]                                                                    Pathologist:           Giuseppe Yun MD                                                             Specimens:   1) - Small Intestine, 2nd portion duodenum biopsy                                                   2) - Gastric, Antrum, antrum fundus and body biopsy                                                 3) - Esophagus, Mid, mid and distal biopsy        "                                         • Final Diagnosis 06/18/2020    Final                    Value:This result contains rich text formatting which cannot be displayed here.      No radiology results for the last 90 days.     Dated June 18, 2020 the patient underwent an upper endoscopy which revealed: Esophageal rings.  Status post serial dilation to 11 mm.  Small sliding hiatal hernia less than 3 cm.  Erythematous distal esophagitis.  Erythematous-erosive gastritis.  Second portion of duodenum, biopsy revealed duodenal type mucosa with no significant histopathologic abnormalities.  Antrum, body and fundus, biopsies revealed gastric antral and fundic type mucosa with mild chronic inactive gastritis.  Negative for intestinal metaplasia or dysplasia.  No Helicobacter pylori-like organisms seen.  Mid and distal esophagus, biopsy revealed squamous mucosa with markedly increased intraepithelial eosinophils (greater than 30 eosinophils per high-power field), in the right clinical and endoscopic setting, this finding would be consistent with eosinophilic esophagitis.  Negative for glandular type mucosa, intestinal metaplasia, or dysplasia.  Negative for specific microorganisms.    Assessment / Plan      1. Esophageal dysphagia  2. Esophagitis, eosinophilic  3. Gastroesophageal reflux disease with esophagitis without hemorrhage  Long standing history of difficulty swallowing with worsening over the past few months. He has a history of EOE, last EGD was in June 2020. At that time, he did not want treatment or food allergy testing due to EOE as he has a very limited list of foods he can eat due to problems with textures and he knew he would not eliminate those foods. He is taking Pantoprazole with reasonable control of reflux. If he tries to decrease dose or stop PPI, his reflux is severe.  Anti-reflux measures  Continue PPI for now.  Advised to eat a softer diet, chew food very well and take sips of water between bites.    He is agreeable for food allergy testing  EGD to rule out esophageal stricture/EOE    - Case Request; Standing  - Implement Anesthesia Orders Day of Procedure; Standing  - Obtain Informed Consent; Standing  - Oxygen Therapy- Nasal Cannula; 2 LPM; Titrate for SPO2: equal to or greater than, 90%; Standing  - sodium chloride 0.9 % infusion  - Case Request  - Food Allergy Profile; Future    8/27/2020  Biopsies consistent with EOE per EGD in June 2020. The patient declines food allergy panel to eliminate food source causing EOE as he has a very limited diet due to food textures and he knows he will not eliminate any of those foods if they were positive. He does not want further treatment or evaluation at this time. He will consider in the future if he has return of difficulty swallowing.    4. Other allergy, initial encounter   - Food Allergy Profile; Future    Previous history   2. Gastroparesis  Changes associated with delayed gastric emptying per EGD. The patient is no longer taking Metoclopramide. He denies epigastric pain, nausea or vomiting.    Patient Instructions   Antireflux measures: Avoid fried, fatty foods, alcohol, chocolate, coffee, tea,  soft drinks, peppermint and spearmint, spicy foods, tomatoes and tomato based foods, onion based foods, and smoking. Other antireflux measures include weight reduction if overweight, avoiding tight clothing around the abdomen, elevating the head of the bed 6 inches with blocks under the head board, and don't drink or eat before going to bed and avoid lying down immediately after meals.  Pantoprazole 40 mg 1 by mouth in the am 30 minutes before breakfast.   The patient should eat relatively soft diet, and should eat in upright position and chew well.  The patient should drink water after 2-3 bites and take medications with liberal amounts of water. Furthermore after eating and taking medications, the patient should remain in upright position for 5-10 minutes.  Food  allergy profile   Upper endoscopy-EGD: Description of the procedure, risks, benefits, alternatives and options, including nonoperative options, were discussed with the patient in detail. The patient understands and wishes to proceed.   COVID-19 testing prior to procedure. The patient will need to self-quarantine after testing until the procedure. Instructions given to patient.    Cornell Ravi, APRN  4/8/2021    Please note that portions of this note may have been completed with a voice recognition program. Efforts were made to edit the dictations, but occasionally words are mistranscribed.

## 2021-04-08 NOTE — PATIENT INSTRUCTIONS
Antireflux measures: Avoid fried, fatty foods, alcohol, chocolate, coffee, tea,  soft drinks, peppermint and spearmint, spicy foods, tomatoes and tomato based foods, onion based foods, and smoking. Other antireflux measures include weight reduction if overweight, avoiding tight clothing around the abdomen, elevating the head of the bed 6 inches with blocks under the head board, and don't drink or eat before going to bed and avoid lying down immediately after meals.  Pantoprazole 40 mg 1 by mouth in the am 30 minutes before breakfast.   The patient should eat relatively soft diet, and should eat in upright position and chew well.  The patient should drink water after 2-3 bites and take medications with liberal amounts of water. Furthermore after eating and taking medications, the patient should remain in upright position for 5-10 minutes.  Food allergy profile   Upper endoscopy-EGD: Description of the procedure, risks, benefits, alternatives and options, including nonoperative options, were discussed with the patient in detail. The patient understands and wishes to proceed.   COVID-19 testing prior to procedure. The patient will need to self-quarantine after testing until the procedure. Instructions given to patient.

## 2021-04-12 PROBLEM — R13.19 ESOPHAGEAL DYSPHAGIA: Status: ACTIVE | Noted: 2021-04-12

## 2021-05-07 ENCOUNTER — LAB (OUTPATIENT)
Dept: LAB | Facility: HOSPITAL | Age: 26
End: 2021-05-07

## 2021-05-07 DIAGNOSIS — Z01.812 PRE-PROCEDURE LAB EXAM: ICD-10-CM

## 2021-05-07 PROCEDURE — C9803 HOPD COVID-19 SPEC COLLECT: HCPCS

## 2021-05-07 PROCEDURE — U0004 COV-19 TEST NON-CDC HGH THRU: HCPCS

## 2021-05-08 LAB — SARS-COV-2 RNA NOSE QL NAA+PROBE: NOT DETECTED

## 2021-05-11 ENCOUNTER — ANESTHESIA EVENT (OUTPATIENT)
Dept: GASTROENTEROLOGY | Facility: HOSPITAL | Age: 26
End: 2021-05-11

## 2021-05-11 ENCOUNTER — ANESTHESIA (OUTPATIENT)
Dept: GASTROENTEROLOGY | Facility: HOSPITAL | Age: 26
End: 2021-05-11

## 2021-05-11 ENCOUNTER — HOSPITAL ENCOUNTER (OUTPATIENT)
Facility: HOSPITAL | Age: 26
Setting detail: HOSPITAL OUTPATIENT SURGERY
Discharge: HOME | End: 2021-05-11
Attending: INTERNAL MEDICINE | Admitting: INTERNAL MEDICINE

## 2021-05-11 VITALS
SYSTOLIC BLOOD PRESSURE: 140 MMHG | WEIGHT: 225 LBS | BODY MASS INDEX: 30.48 KG/M2 | TEMPERATURE: 97.2 F | HEIGHT: 72 IN | HEART RATE: 84 BPM | DIASTOLIC BLOOD PRESSURE: 89 MMHG | OXYGEN SATURATION: 99 % | RESPIRATION RATE: 16 BRPM

## 2021-05-11 DIAGNOSIS — K20.0 ESOPHAGITIS, EOSINOPHILIC: ICD-10-CM

## 2021-05-11 DIAGNOSIS — R13.19 ESOPHAGEAL DYSPHAGIA: ICD-10-CM

## 2021-05-11 DIAGNOSIS — Z01.818 PREOP TESTING: Primary | ICD-10-CM

## 2021-05-11 DIAGNOSIS — K22.2 ESOPHAGEAL STRICTURE: Primary | ICD-10-CM

## 2021-05-11 DIAGNOSIS — K21.00 GASTROESOPHAGEAL REFLUX DISEASE WITH ESOPHAGITIS WITHOUT HEMORRHAGE: ICD-10-CM

## 2021-05-11 PROCEDURE — 43248 EGD GUIDE WIRE INSERTION: CPT | Performed by: INTERNAL MEDICINE

## 2021-05-11 PROCEDURE — 88305 TISSUE EXAM BY PATHOLOGIST: CPT | Performed by: INTERNAL MEDICINE

## 2021-05-11 PROCEDURE — 43239 EGD BIOPSY SINGLE/MULTIPLE: CPT | Performed by: INTERNAL MEDICINE

## 2021-05-11 PROCEDURE — 25010000002 MIDAZOLAM PER 1MG: Performed by: NURSE ANESTHETIST, CERTIFIED REGISTERED

## 2021-05-11 PROCEDURE — 25010000002 PROPOFOL 10 MG/ML EMULSION: Performed by: NURSE ANESTHETIST, CERTIFIED REGISTERED

## 2021-05-11 PROCEDURE — C1769 GUIDE WIRE: HCPCS | Performed by: INTERNAL MEDICINE

## 2021-05-11 PROCEDURE — 25010000002 FENTANYL CITRATE (PF) 100 MCG/2ML SOLUTION: Performed by: NURSE ANESTHETIST, CERTIFIED REGISTERED

## 2021-05-11 RX ORDER — KETAMINE HCL IN NACL, ISO-OSM 100MG/10ML
SYRINGE (ML) INJECTION AS NEEDED
Status: DISCONTINUED | OUTPATIENT
Start: 2021-05-11 | End: 2021-05-11 | Stop reason: SURG

## 2021-05-11 RX ORDER — PROPOFOL 10 MG/ML
VIAL (ML) INTRAVENOUS AS NEEDED
Status: DISCONTINUED | OUTPATIENT
Start: 2021-05-11 | End: 2021-05-11 | Stop reason: SURG

## 2021-05-11 RX ORDER — MIDAZOLAM HYDROCHLORIDE 2 MG/2ML
INJECTION, SOLUTION INTRAMUSCULAR; INTRAVENOUS AS NEEDED
Status: DISCONTINUED | OUTPATIENT
Start: 2021-05-11 | End: 2021-05-11 | Stop reason: SURG

## 2021-05-11 RX ORDER — LIDOCAINE HYDROCHLORIDE 20 MG/ML
INJECTION, SOLUTION INTRAVENOUS AS NEEDED
Status: DISCONTINUED | OUTPATIENT
Start: 2021-05-11 | End: 2021-05-11 | Stop reason: SURG

## 2021-05-11 RX ORDER — SODIUM CHLORIDE 9 MG/ML
70 INJECTION, SOLUTION INTRAVENOUS CONTINUOUS PRN
Status: DISCONTINUED | OUTPATIENT
Start: 2021-05-11 | End: 2021-05-11 | Stop reason: HOSPADM

## 2021-05-11 RX ORDER — SODIUM CHLORIDE 0.9 % (FLUSH) 0.9 %
10 SYRINGE (ML) INJECTION AS NEEDED
Status: DISCONTINUED | OUTPATIENT
Start: 2021-05-11 | End: 2021-05-11 | Stop reason: HOSPADM

## 2021-05-11 RX ORDER — PANTOPRAZOLE SODIUM 40 MG/1
40 TABLET, DELAYED RELEASE ORAL 2 TIMES DAILY
Qty: 60 TABLET | Refills: 2 | Status: ON HOLD | OUTPATIENT
Start: 2021-05-11 | End: 2022-08-12

## 2021-05-11 RX ORDER — FENTANYL CITRATE 50 UG/ML
INJECTION, SOLUTION INTRAMUSCULAR; INTRAVENOUS AS NEEDED
Status: DISCONTINUED | OUTPATIENT
Start: 2021-05-11 | End: 2021-05-11 | Stop reason: SURG

## 2021-05-11 RX ORDER — SODIUM CHLORIDE 9 MG/ML
30 INJECTION, SOLUTION INTRAVENOUS CONTINUOUS PRN
Status: CANCELLED | OUTPATIENT
Start: 2021-05-11

## 2021-05-11 RX ORDER — SODIUM CHLORIDE 9 MG/ML
INJECTION, SOLUTION INTRAVENOUS CONTINUOUS PRN
Status: DISCONTINUED | OUTPATIENT
Start: 2021-05-11 | End: 2021-05-11 | Stop reason: SURG

## 2021-05-11 RX ADMIN — MIDAZOLAM HYDROCHLORIDE 2 MG: 1 INJECTION, SOLUTION INTRAMUSCULAR; INTRAVENOUS at 10:26

## 2021-05-11 RX ADMIN — Medication 25 MG: at 10:29

## 2021-05-11 RX ADMIN — SODIUM CHLORIDE 70 ML/HR: 9 INJECTION, SOLUTION INTRAVENOUS at 07:48

## 2021-05-11 RX ADMIN — PROPOFOL 30 MG: 10 INJECTION, EMULSION INTRAVENOUS at 10:34

## 2021-05-11 RX ADMIN — PROPOFOL 30 MG: 10 INJECTION, EMULSION INTRAVENOUS at 10:43

## 2021-05-11 RX ADMIN — PROPOFOL 30 MG: 10 INJECTION, EMULSION INTRAVENOUS at 10:37

## 2021-05-11 RX ADMIN — PROPOFOL 30 MG: 10 INJECTION, EMULSION INTRAVENOUS at 10:45

## 2021-05-11 RX ADMIN — FENTANYL CITRATE 50 MCG: 50 INJECTION, SOLUTION INTRAMUSCULAR; INTRAVENOUS at 10:33

## 2021-05-11 RX ADMIN — PROPOFOL 30 MG: 10 INJECTION, EMULSION INTRAVENOUS at 10:33

## 2021-05-11 RX ADMIN — PROPOFOL 30 MG: 10 INJECTION, EMULSION INTRAVENOUS at 10:40

## 2021-05-11 RX ADMIN — SODIUM CHLORIDE: 9 INJECTION, SOLUTION INTRAVENOUS at 10:24

## 2021-05-11 RX ADMIN — PROPOFOL 30 MG: 10 INJECTION, EMULSION INTRAVENOUS at 10:32

## 2021-05-11 RX ADMIN — LIDOCAINE HYDROCHLORIDE 40 MG: 20 INJECTION, SOLUTION INTRAVENOUS at 10:28

## 2021-05-11 RX ADMIN — FENTANYL CITRATE 50 MCG: 50 INJECTION, SOLUTION INTRAMUSCULAR; INTRAVENOUS at 10:28

## 2021-05-11 NOTE — ANESTHESIA POSTPROCEDURE EVALUATION
Patient: Crow Rock    Procedure Summary     Date: 05/11/21 Room / Location: [facility] ENDOSCOPY 2 / [facility] ENDOSCOPY    Anesthesia Start: 1024 Anesthesia Stop:     Procedure: ESOPHAGOGASTRODUODENOSCOPY WITH SAVORY DILITATION AND BIOPSY (N/A Esophagus) Diagnosis:       Esophageal dysphagia      Esophagitis, eosinophilic      (Esophageal dysphagia [R13.10])      (Esophagitis, eosinophilic [K20.0])    Surgeons: Nellie Xavier MD Provider: Flaco Crow CRNA    Anesthesia Type: MAC ASA Status: 2          Anesthesia Type: MAC    Vitals  No vitals data found for the desired time range.          Post Anesthesia Care and Evaluation    Patient location during evaluation: PHASE II  Patient participation: complete - patient participated  Level of consciousness: awake  Pain score: 0  Pain management: adequate  Airway patency: patent  Anesthetic complications: No anesthetic complications  PONV Status: none  Cardiovascular status: acceptable  Respiratory status: acceptable and nasal cannula  Hydration status: acceptable    Comments: vsss resp spont, reflexes intact, responsive, report given to pacu nurse

## 2021-05-14 LAB
LAB AP CASE REPORT: NORMAL
PATH REPORT.FINAL DX SPEC: NORMAL

## 2021-05-28 PROBLEM — K22.2 ESOPHAGEAL STRICTURE: Status: ACTIVE | Noted: 2021-05-28

## 2021-06-04 ENCOUNTER — LAB (OUTPATIENT)
Dept: LAB | Facility: HOSPITAL | Age: 26
End: 2021-06-04

## 2021-06-04 DIAGNOSIS — Z01.818 PREOP TESTING: ICD-10-CM

## 2021-06-04 PROCEDURE — C9803 HOPD COVID-19 SPEC COLLECT: HCPCS

## 2021-06-04 PROCEDURE — U0004 COV-19 TEST NON-CDC HGH THRU: HCPCS

## 2021-06-04 NOTE — PRE-PROCEDURE INSTRUCTIONS
PAT phone history completed with pt for upcoming procedure on 6/7/21 with Dr. Xavier.      PAT PASS GIVEN/REVIEWED WITH PT.  VERBALIZED UNDERSTANDING OF THE FOLLOWING:  DO NOT EAT, DRINK, SMOKE, USE SMOKELESS TOBACCO OR CHEW GUM AFTER MIDNIGHT THE NIGHT BEFORE SURGERY.  THIS ALSO INCLUDES HARD CANDIES AND MINTS.    DO NOT SHAVE THE AREA TO BE OPERATED ON AT LEAST 48 HOURS PRIOR TO THE PROCEDURE.  DO NOT WEAR MAKE UP OR NAIL POLISH.  DO NOT LEAVE IN ANY PIERCING OR WEAR JEWELRY THE DAY OF SURGERY.      DO NOT USE ADHESIVES IF YOU WEAR DENTURES.    DO NOT WEAR EYE CONTACTS; BRING IN YOUR GLASSES.    ONLY TAKE MEDICATION THE MORNING OF YOUR PROCEDURE IF INSTRUCTED BY YOUR SURGEON WITH ENOUGH WATER TO SWALLOW THE MEDICATION.  IF YOUR SURGEON DID NOT SPECIFY WHICH MEDICATIONS TO TAKE, YOU WILL NEED TO CALL THEIR OFFICE FOR FURTHER INSTRUCTIONS AND DO AS THEY INSTRUCT.    LEAVE ANYTHING YOU CONSIDER VALUABLE AT HOME.    YOU WILL NEED TO ARRANGE FOR SOMEONE TO DRIVE YOU HOME AFTER SURGERY.  IT IS RECOMMENDED THAT YOU DO NOT DRIVE, WORK, DRINK ALCOHOL OR MAKE MAJOR DECISIONS FOR AT LEAST 24 HOURS AFTER YOUR PROCEDURE IS COMPLETE.      THE DAY OF YOUR PROCEDURE, BRING IN THE FOLLOWING IF APPLICABLE:   PICTURE ID AND INSURANCE/MEDICARE OR MEDICAID CARDS/ANY CO-PAY THAT MAY BE DUE   COPY OF ADVANCED DIRECTIVE/LIVING WILL/POWER OR    CPAP/BIPAP/INHALERS   SKIN PREP SHEET   YOUR PREADMISSION TESTING PASS (IF NOT A PHONE HISTORY)    COVID self-quarantine instructions reviewed with the pt.  Verbalized understanding.

## 2021-06-05 LAB — SARS-COV-2 RNA NOSE QL NAA+PROBE: NOT DETECTED

## 2021-06-07 ENCOUNTER — ANESTHESIA (OUTPATIENT)
Dept: GASTROENTEROLOGY | Facility: HOSPITAL | Age: 26
End: 2021-06-07

## 2021-06-07 ENCOUNTER — HOSPITAL ENCOUNTER (OUTPATIENT)
Facility: HOSPITAL | Age: 26
Setting detail: HOSPITAL OUTPATIENT SURGERY
Discharge: HOME | End: 2021-06-07
Attending: INTERNAL MEDICINE | Admitting: INTERNAL MEDICINE

## 2021-06-07 ENCOUNTER — ANESTHESIA EVENT (OUTPATIENT)
Dept: GASTROENTEROLOGY | Facility: HOSPITAL | Age: 26
End: 2021-06-07

## 2021-06-07 VITALS
BODY MASS INDEX: 30.48 KG/M2 | HEART RATE: 86 BPM | OXYGEN SATURATION: 97 % | HEIGHT: 72 IN | SYSTOLIC BLOOD PRESSURE: 128 MMHG | TEMPERATURE: 97.6 F | WEIGHT: 225 LBS | DIASTOLIC BLOOD PRESSURE: 74 MMHG | RESPIRATION RATE: 16 BRPM

## 2021-06-07 DIAGNOSIS — R13.19 ESOPHAGEAL DYSPHAGIA: Primary | ICD-10-CM

## 2021-06-07 DIAGNOSIS — K22.2 ESOPHAGEAL STRICTURE: Primary | ICD-10-CM

## 2021-06-07 DIAGNOSIS — K22.2 ESOPHAGEAL STRICTURE: ICD-10-CM

## 2021-06-07 DIAGNOSIS — T78.40XA ALLERGY, UNSPECIFIED, INITIAL ENCOUNTER: ICD-10-CM

## 2021-06-07 DIAGNOSIS — K20.0 ESOPHAGITIS, EOSINOPHILIC: ICD-10-CM

## 2021-06-07 PROCEDURE — 86003 ALLG SPEC IGE CRUDE XTRC EA: CPT | Performed by: INTERNAL MEDICINE

## 2021-06-07 PROCEDURE — C1769 GUIDE WIRE: HCPCS | Performed by: INTERNAL MEDICINE

## 2021-06-07 PROCEDURE — 43239 EGD BIOPSY SINGLE/MULTIPLE: CPT | Performed by: INTERNAL MEDICINE

## 2021-06-07 PROCEDURE — 25010000002 PROPOFOL 200 MG/20ML EMULSION: Performed by: NURSE ANESTHETIST, CERTIFIED REGISTERED

## 2021-06-07 PROCEDURE — 88305 TISSUE EXAM BY PATHOLOGIST: CPT | Performed by: INTERNAL MEDICINE

## 2021-06-07 PROCEDURE — 43248 EGD GUIDE WIRE INSERTION: CPT | Performed by: INTERNAL MEDICINE

## 2021-06-07 RX ORDER — SODIUM CHLORIDE 9 MG/ML
30 INJECTION, SOLUTION INTRAVENOUS CONTINUOUS PRN
Status: DISCONTINUED | OUTPATIENT
Start: 2021-06-07 | End: 2021-06-07 | Stop reason: HOSPADM

## 2021-06-07 RX ORDER — LIDOCAINE HYDROCHLORIDE 20 MG/ML
INJECTION, SOLUTION INTRAVENOUS AS NEEDED
Status: DISCONTINUED | OUTPATIENT
Start: 2021-06-07 | End: 2021-06-07 | Stop reason: SURG

## 2021-06-07 RX ORDER — SODIUM CHLORIDE 9 MG/ML
30 INJECTION, SOLUTION INTRAVENOUS CONTINUOUS PRN
Status: CANCELLED | OUTPATIENT
Start: 2021-06-07

## 2021-06-07 RX ORDER — PROPOFOL 10 MG/ML
INJECTION, EMULSION INTRAVENOUS AS NEEDED
Status: DISCONTINUED | OUTPATIENT
Start: 2021-06-07 | End: 2021-06-07 | Stop reason: SURG

## 2021-06-07 RX ORDER — SODIUM CHLORIDE 0.9 % (FLUSH) 0.9 %
10 SYRINGE (ML) INJECTION AS NEEDED
Status: DISCONTINUED | OUTPATIENT
Start: 2021-06-07 | End: 2021-06-07 | Stop reason: HOSPADM

## 2021-06-07 RX ADMIN — SODIUM CHLORIDE 30 ML/HR: 9 INJECTION, SOLUTION INTRAVENOUS at 10:31

## 2021-06-07 RX ADMIN — PROPOFOL 100 MG: 10 INJECTION, EMULSION INTRAVENOUS at 12:35

## 2021-06-07 RX ADMIN — LIDOCAINE HYDROCHLORIDE 100 MG: 20 INJECTION, SOLUTION INTRAVENOUS at 12:35

## 2021-06-07 NOTE — ANESTHESIA PREPROCEDURE EVALUATION
Anesthesia Evaluation     Patient summary reviewed and Nursing notes reviewed   no history of anesthetic complications:  NPO Solid Status: > 8 hours  NPO Liquid Status: > 8 hours           Airway   Mallampati: II  TM distance: >3 FB  Neck ROM: full  No difficulty expected  Dental    (+) poor dentition        Pulmonary - normal exam   (+) sleep apnea,   (-) not a smoker  Cardiovascular - negative cardio ROS and normal exam  Exercise tolerance: excellent (>7 METS)        Neuro/Psych  (+) psychiatric history (Asperger syndrome ),     GI/Hepatic/Renal/Endo    (+) obesity, morbid obesity, GERD poorly controlled,      ROS Comment: Esophagitis  dysphagia    Musculoskeletal (-) negative ROS    Abdominal  - normal exam    Bowel sounds: normal.   Substance History - negative use     OB/GYN negative ob/gyn ROS         Other - negative ROS                       Anesthesia Plan    ASA 2     MAC   (Risks and benefits discussed including risk of aspiration, recall and dental damage. All patient questions answered.    Will continue with plan of care.)  intravenous induction     Anesthetic plan, all risks, benefits, and alternatives have been provided, discussed and informed consent has been obtained with: patient.    Plan discussed with attending.

## 2021-06-07 NOTE — DISCHARGE INSTRUCTIONS
No pushing, pulling, tugging,  heavy lifting, or strenuous activity.  No major decision making, driving, or drinking alcoholic beverages for 24 hours. ( due to the medications you have  received)  Always use good hand hygiene/washing techniques.  NO driving while taking pain medications.    To assist you in voiding:  Drink plenty of fluids  Listen to running water while attempting to void.    If you are unable to urinate and you have an uncomfortable urge to void or it has been   6 hours since you were discharged, return to the Emergency Room      Soft diet today  Avoid ASA/ NSAIDS  PPI BID  Food allergy profile  Repeat EGD  And dilatation to Savary 16 mm -18mm

## 2021-06-07 NOTE — ANESTHESIA POSTPROCEDURE EVALUATION
Patient: Crow Rock    Procedure Summary     Date: 06/07/21 Room / Location: [facility] ENDOSCOPY 1 / [facility] ENDOSCOPY    Anesthesia Start: 1232 Anesthesia Stop: 1244    Procedure: ESOPHAGOGASTRODUODENOSCOPY WITH SAVORY DILATATION AND BIOPSY  (N/A ) Diagnosis:       Esophageal stricture      (Esophageal stricture [K22.2])    Surgeons: Nellie Xavier MD Provider: Shayan Becerra CRNA    Anesthesia Type: MAC ASA Status: 2          Anesthesia Type: MAC    Vitals  Vitals Value Taken Time   /74 06/07/21 1316   Temp 97.6 °F (36.4 °C) 06/07/21 1250   Pulse 86 06/07/21 1316   Resp 16 06/07/21 1316   SpO2 97 % 06/07/21 1316           Post Anesthesia Care and Evaluation    Patient location during evaluation: bedside  Patient participation: complete - patient participated  Level of consciousness: awake and alert  Pain management: satisfactory to patient  Airway patency: patent  Anesthetic complications: No anesthetic complications  PONV Status: none  Cardiovascular status: acceptable and hemodynamically stable  Respiratory status: acceptable  Hydration status: acceptable  No anesthesia care post op

## 2021-06-07 NOTE — H&P
[facility]  HISTORY AND PHYSICAL    Patient Name: Crow Rock  : 1995  MRN: [id number]    Chief Complaint:   For EGD/ dilatation    History Of Presenting Illness:    EoE  Dysphagia  Esophageal stricture    Past Medical History:   Diagnosis Date   • Asperger syndrome    • Chronic gastritis    • Delayed gastric emptying    • Diarrhea    • Dysphagia     MOTHER REPORTS PATIENT HAS RECENTLY HAS GOTTEN CHOKED EASILY AND GETS FOOD STUCK IN THROAT.   • Epigastric abdominal pain    • Esophagitis    • GERD (gastroesophageal reflux disease)    • H/O seasonal allergies    • Hearing loss     Patient's mother reported more loss on the left side but that patient has loss bilateral.  No use of hearing devices.    • Obesity    • Wears glasses        Past Surgical History:   Procedure Laterality Date   • EAR TUBES     • ENDOSCOPY N/A 7/15/2017    Procedure: ESOPHAGOGASTRODUODENOSCOPY with bx , removal foreign body;  Surgeon: Saroj Scott MD;  Location: [facility] OR;  Service:    • ENDOSCOPY N/A 2017    Procedure: ESOPHAGOGASTRODUODENOSCOPY ESOPHAGEAL DILATATION;  Surgeon: Saroj Scott MD;  Location: [facility] ENDOSCOPY;  Service:    • ENDOSCOPY N/A 2019    Procedure: ESOPHAGOGASTRODUODENOSCOPY FOR FOREIGN BODY, biopsy, and esophageal dilitation;  Surgeon: Tyrone Herzog MD;  Location: [facility] ENDOSCOPY;  Service: Gastroenterology   • ENDOSCOPY N/A 2019    Procedure: ESOPHAGOGASTRODUODENOSCOPY with biopsy;  Surgeon: Tyrone Herzog MD;  Location: [facility] ENDOSCOPY;  Service: Gastroenterology   • ENDOSCOPY N/A 3/5/2020    Procedure: ESOPHAGOGASTRODUODENOSCOPY;  Surgeon: Tyrone Herzog MD;  Location: [facility] ENDOSCOPY;  Service: Gastroenterology;  Laterality: N/A;   • ENDOSCOPY N/A 2020    Procedure: ESOPHAGOGASTRODUODENOSCOPY;  Surgeon: Tyrone Herzog MD;  Location: [facility] ENDOSCOPY;  Service: Gastroenterology;  Laterality: N/A;   • ENDOSCOPY N/A 2021    Procedure:  ESOPHAGOGASTRODUODENOSCOPY WITH SAVORY DILITATION AND BIOPSY;  Surgeon: Nellie Xavier MD;  Location: [facility] ENDOSCOPY;  Service: Gastroenterology;  Laterality: N/A;   • UPPER GASTROINTESTINAL ENDOSCOPY  04/09/2019   • UPPER GASTROINTESTINAL ENDOSCOPY  05/14/2019       Social History     Socioeconomic History   • Marital status: Single     Spouse name: Not on file   • Number of children: Not on file   • Years of education: Not on file   • Highest education level: Not on file   Tobacco Use   • Smoking status: Never Smoker   • Smokeless tobacco: Never Used   Vaping Use   • Vaping Use: Never used   Substance and Sexual Activity   • Alcohol use: No   • Drug use: No   • Sexual activity: Defer       Family History   Problem Relation Age of Onset   • No Known Problems Mother    • No Known Problems Father    • Colon cancer Neg Hx        Prior to Admission Medications:  Medications Prior to Admission   Medication Sig Dispense Refill Last Dose   • pantoprazole (PROTONIX) 40 MG EC tablet Take 1 tablet by mouth 2 (two) times a day. TAKE 1 TABLET BY MOUTH EVERY DAY 30 MINUTES BEFORE BREAKFAST 60 tablet 2 6/6/2021 at Unknown time       Allergies:  No Known Allergies     Vitals: Temp:  [97.4 °F (36.3 °C)] 97.4 °F (36.3 °C)  Heart Rate:  [80] 80  Resp:  [18] 18  BP: (139)/(92) 139/92    Review Of Systems:  Constitutional:  Negative for chills, fever, and unexpected weight change.  Respiratory:  Negative for cough, chest tightness, shortness of breath, and wheezing.  Cardiovascular:  Negative for chest pain, palpitations, and leg swelling.  Gastrointestinal:  Negative for abdominal distention, abdominal pain, Nausea, vomiting.  Neurological:  Negative for Weakness, numbness, and headaches.     Physical Exam:    General Appearance:  Alert, cooperative, in no acute distress.   Lungs:   Clear to auscultation, respirations regular, even and                 unlabored.   Heart:  Regular rhythm and normal rate.   Abdomen:   Normal  bowel sounds, no masses, no organomegaly. Soft, non-tender, non-distended   Neurologic: Alert and oriented x 3. Moves all four limbs equally       Plan: ESOPHAGOGASTRODUODENOSCOPY WITH DILATATION CPT CODE: 11328 (N/A)     Nellie Xavier MD  6/7/2021

## 2021-06-09 LAB
LAB AP CASE REPORT: NORMAL
PATH REPORT.FINAL DX SPEC: NORMAL

## 2021-06-10 LAB
CLAM IGE QN: <0.1 KU/L
CODFISH IGE QN: <0.1 KU/L
CONV CLASS DESCRIPTION: ABNORMAL
CORN IGE QN: 0.22 KU/L
COW MILK IGE QN: 3.26 KU/L
EGG WHITE IGE QN: 1.64 KU/L
PEANUT IGE QN: 0.81 KU/L
SCALLOP IGE QN: <0.1 KU/L
SESAME SEED IGE QN: 0.23 KU/L
SHRIMP IGE QN: 0.18 KU/L
SOYBEAN IGE QN: 2.26 KU/L
WALNUT IGE QN: 0.1 KU/L
WHEAT IGE QN: 3.75 KU/L

## 2021-07-20 ENCOUNTER — TELEPHONE (OUTPATIENT)
Dept: GASTROENTEROLOGY | Facility: CLINIC | Age: 26
End: 2021-07-20

## 2021-07-24 ENCOUNTER — TRANSCRIBE ORDERS (OUTPATIENT)
Dept: LAB | Facility: HOSPITAL | Age: 26
End: 2021-07-24

## 2021-07-24 ENCOUNTER — LAB (OUTPATIENT)
Dept: LAB | Facility: HOSPITAL | Age: 26
End: 2021-07-24

## 2021-07-24 DIAGNOSIS — Z01.818 PRE-OPERATIVE CLEARANCE: Primary | ICD-10-CM

## 2021-07-24 DIAGNOSIS — Z11.59 ENCOUNTER FOR SCREENING FOR OTHER VIRAL DISEASES: ICD-10-CM

## 2021-07-24 DIAGNOSIS — Z01.818 PRE-OPERATIVE CLEARANCE: ICD-10-CM

## 2021-07-24 PROCEDURE — U0004 COV-19 TEST NON-CDC HGH THRU: HCPCS

## 2021-07-27 ENCOUNTER — ANESTHESIA EVENT (OUTPATIENT)
Dept: GASTROENTEROLOGY | Facility: HOSPITAL | Age: 26
End: 2021-07-27

## 2021-07-27 ENCOUNTER — HOSPITAL ENCOUNTER (OUTPATIENT)
Facility: HOSPITAL | Age: 26
Setting detail: HOSPITAL OUTPATIENT SURGERY
Discharge: HOME | End: 2021-07-27
Attending: INTERNAL MEDICINE | Admitting: INTERNAL MEDICINE

## 2021-07-27 ENCOUNTER — ANESTHESIA (OUTPATIENT)
Dept: GASTROENTEROLOGY | Facility: HOSPITAL | Age: 26
End: 2021-07-27

## 2021-07-27 VITALS
TEMPERATURE: 97 F | RESPIRATION RATE: 18 BRPM | OXYGEN SATURATION: 99 % | DIASTOLIC BLOOD PRESSURE: 64 MMHG | WEIGHT: 220 LBS | HEIGHT: 71 IN | HEART RATE: 80 BPM | SYSTOLIC BLOOD PRESSURE: 116 MMHG | BODY MASS INDEX: 30.8 KG/M2

## 2021-07-27 DIAGNOSIS — K20.0 ESOPHAGITIS, EOSINOPHILIC: ICD-10-CM

## 2021-07-27 DIAGNOSIS — R13.19 ESOPHAGEAL DYSPHAGIA: Primary | ICD-10-CM

## 2021-07-27 DIAGNOSIS — K22.2 ESOPHAGEAL STRICTURE: ICD-10-CM

## 2021-07-27 LAB
SARS-COV-2 RNA NOSE QL NAA+PROBE: NOT DETECTED
SARS-COV-2 RNA PNL SPEC NAA+PROBE: NOT DETECTED

## 2021-07-27 PROCEDURE — 43249 ESOPH EGD DILATION <30 MM: CPT | Performed by: INTERNAL MEDICINE

## 2021-07-27 PROCEDURE — C1726 CATH, BAL DIL, NON-VASCULAR: HCPCS | Performed by: INTERNAL MEDICINE

## 2021-07-27 PROCEDURE — 88305 TISSUE EXAM BY PATHOLOGIST: CPT | Performed by: INTERNAL MEDICINE

## 2021-07-27 PROCEDURE — 43239 EGD BIOPSY SINGLE/MULTIPLE: CPT | Performed by: INTERNAL MEDICINE

## 2021-07-27 PROCEDURE — 25010000002 PROPOFOL 1000 MG/100ML EMULSION: Performed by: NURSE ANESTHETIST, CERTIFIED REGISTERED

## 2021-07-27 PROCEDURE — C9803 HOPD COVID-19 SPEC COLLECT: HCPCS

## 2021-07-27 PROCEDURE — 87635 SARS-COV-2 COVID-19 AMP PRB: CPT | Performed by: INTERNAL MEDICINE

## 2021-07-27 RX ORDER — HYDROCODONE BITARTRATE AND ACETAMINOPHEN 7.5; 325 MG/1; MG/1
1 TABLET ORAL ONCE AS NEEDED
Status: CANCELLED | OUTPATIENT
Start: 2021-07-27

## 2021-07-27 RX ORDER — LIDOCAINE HYDROCHLORIDE 20 MG/ML
INJECTION, SOLUTION INTRAVENOUS AS NEEDED
Status: DISCONTINUED | OUTPATIENT
Start: 2021-07-27 | End: 2021-07-27 | Stop reason: SURG

## 2021-07-27 RX ORDER — PROPOFOL 10 MG/ML
INJECTION, EMULSION INTRAVENOUS AS NEEDED
Status: DISCONTINUED | OUTPATIENT
Start: 2021-07-27 | End: 2021-07-27 | Stop reason: SURG

## 2021-07-27 RX ORDER — ACETAMINOPHEN AND CODEINE PHOSPHATE 300; 30 MG/1; MG/1
1 TABLET ORAL EVERY 6 HOURS PRN
Qty: 15 TABLET | Refills: 0 | Status: SHIPPED | OUTPATIENT
Start: 2021-07-27 | End: 2021-09-01

## 2021-07-27 RX ORDER — SODIUM CHLORIDE 9 MG/ML
30 INJECTION, SOLUTION INTRAVENOUS CONTINUOUS PRN
Status: DISCONTINUED | OUTPATIENT
Start: 2021-07-27 | End: 2021-07-27 | Stop reason: HOSPADM

## 2021-07-27 RX ORDER — SODIUM CHLORIDE 0.9 % (FLUSH) 0.9 %
10 SYRINGE (ML) INJECTION AS NEEDED
Status: DISCONTINUED | OUTPATIENT
Start: 2021-07-27 | End: 2021-07-27 | Stop reason: HOSPADM

## 2021-07-27 RX ADMIN — LIDOCAINE HYDROCHLORIDE 60 MG: 20 INJECTION, SOLUTION INTRAVENOUS at 09:40

## 2021-07-27 RX ADMIN — PROPOFOL 50 MG: 10 INJECTION, EMULSION INTRAVENOUS at 09:43

## 2021-07-27 RX ADMIN — SODIUM CHLORIDE 30 ML/HR: 9 INJECTION, SOLUTION INTRAVENOUS at 08:32

## 2021-07-27 RX ADMIN — PROPOFOL 50 MG: 10 INJECTION, EMULSION INTRAVENOUS at 09:51

## 2021-07-27 RX ADMIN — PROPOFOL 50 MG: 10 INJECTION, EMULSION INTRAVENOUS at 09:48

## 2021-07-27 RX ADMIN — PROPOFOL 50 MG: 10 INJECTION, EMULSION INTRAVENOUS at 09:46

## 2021-07-27 RX ADMIN — PROPOFOL 100 MG: 10 INJECTION, EMULSION INTRAVENOUS at 09:40

## 2021-07-27 NOTE — ANESTHESIA PREPROCEDURE EVALUATION
Anesthesia Evaluation     Patient summary reviewed and Nursing notes reviewed   no history of anesthetic complications:  NPO Solid Status: > 8 hours  NPO Liquid Status: > 8 hours           Airway   Mallampati: II  TM distance: >3 FB  Neck ROM: full  No difficulty expected  Dental    (+) poor dentition        Pulmonary - normal exam   (+) sleep apnea,   (-) not a smoker  Cardiovascular - negative cardio ROS and normal exam  Exercise tolerance: good (4-7 METS)        Neuro/Psych  (+) psychiatric history (Asperger syndrome ),     GI/Hepatic/Renal/Endo    (+) obesity, morbid obesity, GERD poorly controlled,      ROS Comment: Esophagitis  dysphagia    Musculoskeletal (-) negative ROS    Abdominal  - normal exam   Substance History - negative use     OB/GYN negative ob/gyn ROS         Other - negative ROS       ROS/Med Hx Other: Gastroesophageal reflux disease with esophagitis  Chronic gastritis without bleeding  Dysphagia  Heartburn  Delayed gastric emptying  Esophagitis  Epigastric pain  Esophagitis, eosinophilic  Gastroparesis  Esophageal dysphagia  Esophageal stricture    Asperger syndrome                    Anesthesia Plan    ASA 2     MAC   (Patient advised that intravenous sedation would be utilized as primary anesthetic technique. Every effort will be made to make sure patient is comfortable. Patient advised that they may experience recall of events of the procedure. Patient verbalized understanding and agreed to plan. )  intravenous induction     Anesthetic plan, all risks, benefits, and alternatives have been provided, discussed and informed consent has been obtained with: patient.    Plan discussed with CRNA.

## 2021-07-27 NOTE — ANESTHESIA POSTPROCEDURE EVALUATION
Patient: Crow Rock    Procedure Summary     Date: 07/27/21 Room / Location: [facility] ENDOSCOPY 2 / [facility] ENDOSCOPY    Anesthesia Start: 0940 Anesthesia Stop: 0954    Procedure: ESOPHAGOGASTRODUODENOSCOPY WITH DILATATION and biopsy CPT CODE: 85301 (N/A ) Diagnosis:       Esophageal stricture      (Esophageal stricture [K22.2])    Surgeons: Nellie Xavier MD Provider: Lenny Duff CRNA    Anesthesia Type: MAC ASA Status: 2          Anesthesia Type: MAC    Vitals  Vitals Value Taken Time   /64 07/27/21 1030   Temp 97 °F (36.1 °C) 07/27/21 1000   Pulse 80 07/27/21 1030   Resp 18 07/27/21 1030   SpO2 99 % 07/27/21 1030           Post Anesthesia Care and Evaluation    Patient location during evaluation: PHASE II  Patient participation: complete - patient participated  Level of consciousness: awake and sleepy but conscious  Pain management: adequate  Airway patency: patent  Anesthetic complications: No anesthetic complications  PONV Status: none  Cardiovascular status: acceptable and hemodynamically stable  Respiratory status: acceptable, room air, nonlabored ventilation and spontaneous ventilation  Hydration status: acceptable

## 2021-07-29 ENCOUNTER — TELEPHONE (OUTPATIENT)
Dept: GASTROENTEROLOGY | Facility: CLINIC | Age: 26
End: 2021-07-29

## 2021-07-29 LAB
LAB AP CASE REPORT: NORMAL
PATH REPORT.FINAL DX SPEC: NORMAL

## 2021-07-29 NOTE — TELEPHONE ENCOUNTER
Mother called wanting to speak with Cornell.  She is wanting clarification about dietary restrictions.  They were given a diet on the day of his procedure, and she does not know how restrictive, or anything about the diet.  She states that it had cow's milk, wheat, soy on the diet, but she states that patient will only eat certain things and she does not know how restrictive to be, since patient is special needs.  Patient does not eat any veggies or fruits.

## 2021-07-29 NOTE — TELEPHONE ENCOUNTER
He has eosinophilic esophagitis-that is the reason he continues to have difficulty swallowing. If he does not change his diet and avoid foods that are causing the EOE, his swallowing will not improve. It is important that he avoid cows milk, wheat, soy, and egg whites. She can look into alternative foods that do not contain those products.

## 2021-07-30 NOTE — TELEPHONE ENCOUNTER
Spoke to mother.   Information given.  She may want to consider seeing an allergist in the future.  She states understanding.

## 2021-09-01 ENCOUNTER — OFFICE VISIT (OUTPATIENT)
Dept: GASTROENTEROLOGY | Facility: CLINIC | Age: 26
End: 2021-09-01

## 2021-09-01 VITALS
RESPIRATION RATE: 16 BRPM | HEIGHT: 71 IN | TEMPERATURE: 97.7 F | WEIGHT: 228 LBS | SYSTOLIC BLOOD PRESSURE: 142 MMHG | BODY MASS INDEX: 31.92 KG/M2 | HEART RATE: 73 BPM | DIASTOLIC BLOOD PRESSURE: 83 MMHG

## 2021-09-01 DIAGNOSIS — K20.0 ESOPHAGITIS, EOSINOPHILIC: Primary | Chronic | ICD-10-CM

## 2021-09-01 DIAGNOSIS — K21.00 GASTROESOPHAGEAL REFLUX DISEASE WITH ESOPHAGITIS WITHOUT HEMORRHAGE: Chronic | ICD-10-CM

## 2021-09-01 PROCEDURE — 99214 OFFICE O/P EST MOD 30 MIN: CPT | Performed by: NURSE PRACTITIONER

## 2021-09-01 RX ORDER — BUDESONIDE 1 MG/2ML
INHALANT ORAL
Qty: 60 EACH | Refills: 1 | Status: SHIPPED | OUTPATIENT
Start: 2021-09-01 | End: 2022-02-05 | Stop reason: HOSPADM

## 2021-09-01 NOTE — PROGRESS NOTES
Follow Up Note     Date: 2021   Patient Name: Crow Rock  MRN: [id number]  : 1995     Primary Care Provider: Silvano Cazares MD     Chief Complaint   Patient presents with   • Follow-up     History of present illness:   2021  Crow Rock is a 26 y.o. male who is here today for follow up after EGDs.    His swallowing has been doing better. He has tried to avoid milk, wheat, eggs and soy, but he has a very limited diet and has had a difficult time finding foods he can eat. He admits he does not eat fruits or vegetables due to texture problems.    Interval History:  2021  Over the past few months his difficulty swallowing has been worsening. He had been eating a few weeks ago and felt the food would not go down. After several hours, it finally passed. He has a history of EOE, but did not want treatment or food allergy testing after his last EGD in . Reflux well controlled with PPI. Reflux is severe if he tries to stop it. Denies abdominal pain or nausea. No constipation, diarrhea or rectal bleeding.    2020  Crow Rock is a 25 y.o. male who is here today for follow up for difficulty swallowing. He has been doing very well. He has not had any further episodes of difficulty swallowing. Heartburn is well controlled with Pantoprazole. He is no longer taking Metoclopramide. The patient denies constipation, diarrhea or rectal bleeding.     2020  The patient has a history of reflux off and on for the last several years.  The reflux is moderate to severe.  Symptoms are described as retrosternal burning sensation, and indigestion.  There is history of frequent regurgitative symptoms.  Frequency being several times per week.  The symptoms are worse at night.  The patient takes acid suppressive therapy with reasonable control of his symptoms.  His dysphagia has significantly improved.  The patient denies further dysphagia symptoms.  He denies abdominal pain.  There  is no nausea or vomiting.  Currently, the patient denies diarrhea or constipation.    Subjective      Past Medical History:   Diagnosis Date   • Allergy to bean 2021    Soybeans   • Asperger syndrome    • Chronic gastritis    • Cow's milk allergy 2021   • Delayed gastric emptying    • Diarrhea    • Dysphagia     MOTHER REPORTS PATIENT HAS RECENTLY HAS GOTTEN CHOKED EASILY AND GETS FOOD STUCK IN THROAT.   • Egg allergy 2021   • Epigastric abdominal pain    • Esophagitis    • GERD (gastroesophageal reflux disease)    • H/O seasonal allergies    • Hearing loss     Patient's mother reported more loss on the left side but that patient has loss bilateral.  No use of hearing devices.    • Obesity    • Wears glasses    • Wheat allergy 2021     Past Surgical History:   Procedure Laterality Date   • EAR TUBES     • ENDOSCOPY N/A 7/15/2017    Procedure: ESOPHAGOGASTRODUODENOSCOPY with bx , removal foreign body;  Surgeon: Saroj Scott MD;  Location: [facility] OR;  Service:    • ENDOSCOPY N/A 8/24/2017    Procedure: ESOPHAGOGASTRODUODENOSCOPY ESOPHAGEAL DILATATION;  Surgeon: Saroj Scott MD;  Location: [facility] ENDOSCOPY;  Service:    • ENDOSCOPY N/A 4/9/2019    Procedure: ESOPHAGOGASTRODUODENOSCOPY FOR FOREIGN BODY, biopsy, and esophageal dilitation;  Surgeon: Tyrone Herzog MD;  Location: [facility] ENDOSCOPY;  Service: Gastroenterology   • ENDOSCOPY N/A 5/14/2019    Procedure: ESOPHAGOGASTRODUODENOSCOPY with biopsy;  Surgeon: Tyrone Herzog MD;  Location: [facility] ENDOSCOPY;  Service: Gastroenterology   • ENDOSCOPY N/A 3/5/2020    Procedure: ESOPHAGOGASTRODUODENOSCOPY;  Surgeon: Tyrone Herzog MD;  Location: [facility] ENDOSCOPY;  Service: Gastroenterology;  Laterality: N/A;   • ENDOSCOPY N/A 6/18/2020    Procedure: ESOPHAGOGASTRODUODENOSCOPY;  Surgeon: Tyrone Herzog MD;  Location: [facility] ENDOSCOPY;  Service: Gastroenterology;  Laterality: N/A;   • ENDOSCOPY N/A 5/11/2021    Procedure: ESOPHAGOGASTRODUODENOSCOPY WITH SAVORY  DILITATION AND BIOPSY;  Surgeon: Nellie Xavier MD;  Location: [facility] ENDOSCOPY;  Service: Gastroenterology;  Laterality: N/A;   • ENDOSCOPY N/A 6/7/2021    Procedure: ESOPHAGOGASTRODUODENOSCOPY WITH SAVORY DILATATION AND BIOPSY ;  Surgeon: Nellie Xavier MD;  Location: [facility] ENDOSCOPY;  Service: Gastroenterology;  Laterality: N/A;   • ENDOSCOPY N/A 7/27/2021    Procedure: ESOPHAGOGASTRODUODENOSCOPY WITH DILATATION AND BIOPSY;  Surgeon: Nellie Xavier MD;  Location: [facility] ENDOSCOPY;  Service: Gastroenterology;  Laterality: N/A;   • UPPER GASTROINTESTINAL ENDOSCOPY  04/09/2019   • UPPER GASTROINTESTINAL ENDOSCOPY  05/14/2019     Family History   Problem Relation Age of Onset   • No Known Problems Mother    • No Known Problems Father    • Colon cancer Neg Hx      Social History     Socioeconomic History   • Marital status: Single     Spouse name: Not on file   • Number of children: Not on file   • Years of education: Not on file   • Highest education level: Not on file   Tobacco Use   • Smoking status: Never Smoker   • Smokeless tobacco: Never Used   Vaping Use   • Vaping Use: Never used   Substance and Sexual Activity   • Alcohol use: No   • Drug use: No   • Sexual activity: Defer       Current Outpatient Medications:   •  pantoprazole (PROTONIX) 40 MG EC tablet, Take 1 tablet by mouth 2 (two) times a day. TAKE 1 TABLET BY MOUTH EVERY DAY 30 MINUTES BEFORE BREAKFAST, Disp: 60 tablet, Rfl: 2  •  budesonide (PULMICORT) 1 MG/2ML nebulizer solution, Use as directed in slurry after breakfast and dinner daily x 8 weeks, Disp: 60 each, Rfl: 1     No Known Allergies     The following portions of the patient's history were reviewed and updated as appropriate: allergies, current medications, past family history, past medical history, past social history, past surgical history and problem list.  Objective     Physical Exam  Vitals and nursing note reviewed.   Constitutional:       General: He is not in  "acute distress.     Appearance: Normal appearance. He is well-developed. He is not diaphoretic.   HENT:      Head: Normocephalic and atraumatic.      Right Ear: Hearing and external ear normal.      Left Ear: Hearing and external ear normal.      Nose: Nose normal.      Mouth/Throat:      Mouth: Mucous membranes are not pale, not dry and not cyanotic.   Eyes:      General: Lids are normal.         Right eye: No discharge.         Left eye: No discharge.      Conjunctiva/sclera: Conjunctivae normal.   Neck:      Trachea: Trachea normal.   Cardiovascular:      Rate and Rhythm: Normal rate and regular rhythm.      Heart sounds: Normal heart sounds.   Pulmonary:      Effort: Pulmonary effort is normal. No respiratory distress.      Breath sounds: Normal breath sounds.   Abdominal:      General: Bowel sounds are normal. There is no distension.      Palpations: Abdomen is soft. There is no mass.      Tenderness: There is no abdominal tenderness. There is no guarding or rebound.      Hernia: No hernia is present.   Skin:     General: Skin is warm and dry.      Coloration: Skin is not pale.      Findings: No rash.   Neurological:      Mental Status: He is alert and oriented to person, place, and time.      Cranial Nerves: No cranial nerve deficit.   Psychiatric:         Mood and Affect: Mood normal.         Speech: Speech normal.         Behavior: Behavior is cooperative.       Vitals:    09/01/21 1531   BP: 142/83   Pulse: 73   Resp: 16   Temp: 97.7 °F (36.5 °C)   Weight: 103 kg (228 lb)   Height: 180.3 cm (71\")     Body mass index is 31.8 kg/m².     Results Review:   I reviewed the patient's new clinical results.    Admission on 07/27/2021, Discharged on 07/27/2021   Component Date Value Ref Range Status   • COVID19 07/27/2021 Not Detected  Not Detected - Ref. Range Final   • Case Report 07/27/2021    Final                    Value:Surgical Pathology Report                         Case: [id number]                          "         Authorizing Provider:  Nellie Xavier MD  Collected:           07/27/2021 09:51 AM          Ordering Location:     [facility]    Received:            07/27/2021 02:06 PM                                 [facility]                                                                    Pathologist:           Sumeet Hendrix MD                                                       Specimen:    Esophagus, distal mid and proximal for follow up EOE treatment                            • Final Diagnosis 07/27/2021    Final                    Value:This result contains rich text formatting which cannot be displayed here.   Lab on 07/24/2021   Component Date Value Ref Range Status   • SARS-CoV-2 SANAM 07/24/2021 Not Detected  Not Detected Final   Admission on 06/07/2021, Discharged on 06/07/2021   Component Date Value Ref Range Status   • Case Report 06/07/2021    Final                    Value:Surgical Pathology Report                         Case: [id number]                                  Authorizing Provider:  Nellie Xavier MD  Collected:           06/07/2021 12:41 PM          Ordering Location:     [facility]    Received:            06/07/2021 02:17 PM                                 [facility]                                                                    Pathologist:           Rajat Gregorio MD                                                            Specimen:    Esophagus, bx r\o EOE                                                                     • Final Diagnosis 06/07/2021    Final                    Value:This result contains rich text formatting which cannot be displayed here.   • Class Description 06/07/2021 Comment   Final        Levels of Specific IgE       Class  Description of Class      ---------------------------  -----  --------------------                     < 0.10         0         Negative             0.10 -    0.31         0/I        Equivocal/Low             0.32 -    0.55         I         Low             0.56 -    1.40         II        Moderate             1.41 -    3.90         III       High             3.91 -   19.00         IV        Very High            19.01 -  100.00         V         Very High                    >100.00         VI        Very High   • Egg White 06/07/2021 1.64* Class III kU/L Final   • Peanut 06/07/2021 0.81* Class II kU/L Final   • Soybean 06/07/2021 2.26* Class III kU/L Final   • Milk, Cow's 06/07/2021 3.26* Class III kU/L Final   • Clams 06/07/2021 <0.10  Class 0 kU/L Final   • Shrimp 06/07/2021 0.18* Class 0/I kU/L Final   • [place] 06/07/2021 0.10* Class 0/I kU/L Final   • CodFish 06/07/2021 <0.10  Class 0 kU/L Final   • Scallop 06/07/2021 <0.10  Class 0 kU/L Final   • Wheat 06/07/2021 3.75* Class III kU/L Final   • Corn 06/07/2021 0.22* Class 0/I kU/L Final   • Sesame Seed 06/07/2021 0.23* Class 0/I kU/L Final   Lab on 06/04/2021   Component Date Value Ref Range Status   • SARS-CoV-2 SANAM 06/04/2021 Not Detected  Not Detected Final      No radiology results for the last 90 days.     EGD dated 5/11/2021 per Dr. Xavier  - Normal oropharynx.  - Z-line regular, 39 cm from the incisors.  - Benign-appearing esophageal stenosis. Dilated. Biopsied.  - Esophageal mucosal changes consistent with eosinophilic esophagitis with narrow caliber esophagus. Biopsied.  - Non-bleeding erosive gastropathy. Biopsied.  - A medium amount of food (residue) in the stomach suggesting possible mild gastroparesis, unless patient ate anythin in the morning.  - Normal duodenal bulb, first portion of the duodenum, second portion of the duodenum and third portion of the duodenum. Biopsied.  - Duodenum biopsies unremarkable. Gastric biopsies with mild chronic gastritis. Esophagus biopsies with eosinophil rich esophagitis (EOE).    EGD dated 6/7/2021 per Dr. Xavier  - Normal oropharynx.  - Esophageal mucosal changes secondary to eosinophilic  esophagitis. Dilated. Biopsied.  - Z-line regular, 40 cm from the incisors.  - Non-bleeding erosive gastropathy.  - No gross lesions in the duodenal bulb, in the first portion of the duodenum, in the second portion of the duodenum and in the third portion of the duodenum.  - Esophagus biopsies with increased eosinophils, EOE.    EGD dated 7/27/2021 per Dr. Xavier  - Normal oropharynx.  - Z-line regular, 40 cm from the incisors.  - Benign-appearing esophageal stenosis. Dilated. Biopsied.  - No gross lesions in the stomach.  - Normal duodenal bulb, first portion of the duodenum, second portion of the duodenum and third portion of the duodenum.  - Esophageal mucosal changes secondary to eosinophilic esophagitis. Biopsied.  - Esophagus biopsies with EOE.    Assessment / Plan      1. Esophagitis, eosinophilic  2. Gastroesophageal reflux disease with esophagitis without hemorrhage  3. Esophageal dysphagia  He has not been having difficulty swallowing since his last EGD. Biopsies with EOE. Food allergy profile with allergy to cow's milk, eggs, wheat and soy. He has cut back on those foods, but he continues to consume those items on a daily basis as the majority of his diet consists of milk, breads, breaded chicken, etc. He does not know what he can or cannot eat and would like a referral to a dietician that can help him with substitute items he can have.  Referral to nutrition services.  Referral to allergy.  Budesonide slurry po BID x 8 weeks - patient given instructions and gone over in detail with patient and his mother.  EGD in 6 months for surveillance of EOE.    - budesonide (PULMICORT) 1 MG/2ML nebulizer solution; Use as directed in slurry after breakfast and dinner daily x 8 weeks  Dispense: 60 each; Refill: 1  - Ambulatory Referral to Allergy  - Ambulatory Referral to Nutrition Services    4/8/2021  Long standing history of difficulty swallowing with worsening over the past few months. He has a history of EOE,  last EGD was in June 2020. At that time, he did not want treatment or food allergy testing due to EOE as he has a very limited list of foods he can eat due to problems with textures and he knew he would not eliminate those foods. He is taking Pantoprazole with reasonable control of reflux. If he tries to decrease dose or stop PPI, his reflux is severe.  Anti-reflux measures  Continue PPI for now.  Advised to eat a softer diet, chew food very well and take sips of water between bites.   He is agreeable for food allergy testing  EGD to rule out esophageal stricture/EOE     8/27/2020  Biopsies consistent with EOE per EGD in June 2020. The patient declines food allergy panel to eliminate food source causing EOE as he has a very limited diet due to food textures and he knows he will not eliminate any of those foods if they were positive. He does not want further treatment or evaluation at this time. He will consider in the future if he has return of difficulty swallowing.    Patient Instructions   Antireflux measures: Avoid fried, fatty foods, alcohol, chocolate, coffee, tea,  soft drinks, peppermint and spearmint, spicy foods, tomatoes and tomato based foods, onions, peppers, and smoking.   Other antireflux measures include weight reduction if overweight, avoiding tight clothing around the abdomen, elevating the head of the bed 6 inches with blocks under the head board, and don't drink or eat before going to bed and avoid lying down immediately after meals.  Pantoprazole 40 mg 1 by mouth in the am 30 minutes before breakfast.  AVOID- milk, eggs, soybean and wheat  Budesonide slurry twice a day x 8 weeks - Instructions given to patient.  Referral to allergist  Referral to nutrition services to assist with dietary recommendations.  EGD in 6 months  Follow up: 3 months    Cornell Ravi, APRN  9/1/2021    Please note that portions of this note may have been completed with a voice recognition program. Efforts were made  to edit the dictations, but occasionally words are mistranscribed.

## 2021-09-01 NOTE — PATIENT INSTRUCTIONS
Antireflux measures: Avoid fried, fatty foods, alcohol, chocolate, coffee, tea,  soft drinks, peppermint and spearmint, spicy foods, tomatoes and tomato based foods, onions, peppers, and smoking.   Other antireflux measures include weight reduction if overweight, avoiding tight clothing around the abdomen, elevating the head of the bed 6 inches with blocks under the head board, and don't drink or eat before going to bed and avoid lying down immediately after meals.  Pantoprazole 40 mg 1 by mouth in the am 30 minutes before breakfast.  AVOID- milk, eggs, soybean and wheat  Budesonide slurry twice a day x 8 weeks - Instructions given to patient.  Referral to allergist  Referral to nutrition services to assist with dietary recommendations.  EGD in 6 months  Follow up: 3 months

## 2021-09-17 ENCOUNTER — TELEPHONE (OUTPATIENT)
Dept: GASTROENTEROLOGY | Facility: CLINIC | Age: 26
End: 2021-09-17

## 2021-09-17 NOTE — TELEPHONE ENCOUNTER
I spoke with Mother of patient today.  I ran the PA through [employer] my meds, for his [place] plan.  [employer] med[employer] says there is no authorization needed for the [place].  I faxed paperwork back to the pharmacy this week, after SHARATH Sarabia had filled out some specific information.  Mother will check with the pharmacy and see if medication is ready or needs other authorization.

## 2022-02-05 ENCOUNTER — ANESTHESIA EVENT (OUTPATIENT)
Dept: PERIOP | Facility: HOSPITAL | Age: 27
End: 2022-02-05

## 2022-02-05 ENCOUNTER — HOSPITAL ENCOUNTER (EMERGENCY)
Facility: HOSPITAL | Age: 27
Discharge: HOME | End: 2022-02-05
Attending: EMERGENCY MEDICINE | Admitting: SURGERY

## 2022-02-05 ENCOUNTER — ANESTHESIA (OUTPATIENT)
Dept: PERIOP | Facility: HOSPITAL | Age: 27
End: 2022-02-05

## 2022-02-05 VITALS
HEIGHT: 72 IN | RESPIRATION RATE: 13 BRPM | DIASTOLIC BLOOD PRESSURE: 70 MMHG | TEMPERATURE: 97.6 F | BODY MASS INDEX: 30.72 KG/M2 | SYSTOLIC BLOOD PRESSURE: 108 MMHG | WEIGHT: 226.8 LBS | OXYGEN SATURATION: 98 % | HEART RATE: 80 BPM

## 2022-02-05 DIAGNOSIS — K22.2 ESOPHAGEAL OBSTRUCTION DUE TO FOOD IMPACTION: Primary | ICD-10-CM

## 2022-02-05 DIAGNOSIS — T18.128A ESOPHAGEAL OBSTRUCTION DUE TO FOOD IMPACTION: Primary | ICD-10-CM

## 2022-02-05 DIAGNOSIS — R13.10 DYSPHAGIA: ICD-10-CM

## 2022-02-05 PROBLEM — T18.108A ESOPHAGEAL FOREIGN BODY, INITIAL ENCOUNTER: Status: ACTIVE | Noted: 2022-02-05

## 2022-02-05 LAB
ALBUMIN SERPL-MCNC: 4.9 G/DL (ref 3.5–5.2)
ALBUMIN/GLOB SERPL: 1.7 G/DL
ALP SERPL-CCNC: 107 U/L (ref 39–117)
ALT SERPL W P-5'-P-CCNC: 22 U/L (ref 1–41)
ANION GAP SERPL CALCULATED.3IONS-SCNC: 12.1 MMOL/L (ref 5–15)
AST SERPL-CCNC: 19 U/L (ref 1–40)
BASOPHILS # BLD AUTO: 0.04 10*3/MM3 (ref 0–0.2)
BASOPHILS NFR BLD AUTO: 0.7 % (ref 0–1.5)
BILIRUB SERPL-MCNC: 1.1 MG/DL (ref 0–1.2)
BUN SERPL-MCNC: 13 MG/DL (ref 6–20)
BUN/CREAT SERPL: 12.3 (ref 7–25)
CALCIUM SPEC-SCNC: 9.8 MG/DL (ref 8.6–10.5)
CHLORIDE SERPL-SCNC: 102 MMOL/L (ref 98–107)
CO2 SERPL-SCNC: 25.9 MMOL/L (ref 22–29)
CREAT SERPL-MCNC: 1.06 MG/DL (ref 0.76–1.27)
DEPRECATED RDW RBC AUTO: 41.2 FL (ref 37–54)
EOSINOPHIL # BLD AUTO: 0.34 10*3/MM3 (ref 0–0.4)
EOSINOPHIL NFR BLD AUTO: 5.6 % (ref 0.3–6.2)
ERYTHROCYTE [DISTWIDTH] IN BLOOD BY AUTOMATED COUNT: 12.3 % (ref 12.3–15.4)
GFR SERPL CREATININE-BSD FRML MDRD: 83 ML/MIN/1.73
GLOBULIN UR ELPH-MCNC: 2.9 GM/DL
GLUCOSE SERPL-MCNC: 97 MG/DL (ref 65–99)
HCT VFR BLD AUTO: 48.4 % (ref 37.5–51)
HGB BLD-MCNC: 16.5 G/DL (ref 13–17.7)
IMM GRANULOCYTES # BLD AUTO: 0.02 10*3/MM3 (ref 0–0.05)
IMM GRANULOCYTES NFR BLD AUTO: 0.3 % (ref 0–0.5)
LYMPHOCYTES # BLD AUTO: 1.74 10*3/MM3 (ref 0.7–3.1)
LYMPHOCYTES NFR BLD AUTO: 28.5 % (ref 19.6–45.3)
MCH RBC QN AUTO: 31.2 PG (ref 26.6–33)
MCHC RBC AUTO-ENTMCNC: 34.1 G/DL (ref 31.5–35.7)
MCV RBC AUTO: 91.5 FL (ref 79–97)
MONOCYTES # BLD AUTO: 0.72 10*3/MM3 (ref 0.1–0.9)
MONOCYTES NFR BLD AUTO: 11.8 % (ref 5–12)
NEUTROPHILS NFR BLD AUTO: 3.25 10*3/MM3 (ref 1.7–7)
NEUTROPHILS NFR BLD AUTO: 53.1 % (ref 42.7–76)
NRBC BLD AUTO-RTO: 0 /100 WBC (ref 0–0.2)
PLATELET # BLD AUTO: 224 10*3/MM3 (ref 140–450)
PMV BLD AUTO: 10.9 FL (ref 6–12)
POTASSIUM SERPL-SCNC: 3.7 MMOL/L (ref 3.5–5.2)
PROT SERPL-MCNC: 7.8 G/DL (ref 6–8.5)
RBC # BLD AUTO: 5.29 10*6/MM3 (ref 4.14–5.8)
SARS-COV-2 RNA PNL SPEC NAA+PROBE: NOT DETECTED
SODIUM SERPL-SCNC: 140 MMOL/L (ref 136–145)
WBC NRBC COR # BLD: 6.11 10*3/MM3 (ref 3.4–10.8)

## 2022-02-05 PROCEDURE — C9803 HOPD COVID-19 SPEC COLLECT: HCPCS

## 2022-02-05 PROCEDURE — 88305 TISSUE EXAM BY PATHOLOGIST: CPT | Performed by: SURGERY

## 2022-02-05 PROCEDURE — 80053 COMPREHEN METABOLIC PANEL: CPT | Performed by: EMERGENCY MEDICINE

## 2022-02-05 PROCEDURE — 85025 COMPLETE CBC W/AUTO DIFF WBC: CPT | Performed by: EMERGENCY MEDICINE

## 2022-02-05 PROCEDURE — 99284 EMERGENCY DEPT VISIT MOD MDM: CPT

## 2022-02-05 PROCEDURE — 25010000002 METOCLOPRAMIDE PER 10 MG: Performed by: NURSE ANESTHETIST, CERTIFIED REGISTERED

## 2022-02-05 PROCEDURE — 25010000002 PROPOFOL 10 MG/ML EMULSION: Performed by: NURSE ANESTHETIST, CERTIFIED REGISTERED

## 2022-02-05 PROCEDURE — 99219 PR INITIAL OBSERVATION CARE/DAY 50 MINUTES: CPT | Performed by: SURGERY

## 2022-02-05 PROCEDURE — 87635 SARS-COV-2 COVID-19 AMP PRB: CPT | Performed by: EMERGENCY MEDICINE

## 2022-02-05 PROCEDURE — 25010000002 ONDANSETRON PER 1 MG: Performed by: NURSE ANESTHETIST, CERTIFIED REGISTERED

## 2022-02-05 RX ORDER — ONDANSETRON 2 MG/ML
INJECTION INTRAMUSCULAR; INTRAVENOUS AS NEEDED
Status: DISCONTINUED | OUTPATIENT
Start: 2022-02-05 | End: 2022-02-05 | Stop reason: SURG

## 2022-02-05 RX ORDER — SODIUM CHLORIDE 9 MG/ML
INJECTION, SOLUTION INTRAVENOUS CONTINUOUS PRN
Status: DISCONTINUED | OUTPATIENT
Start: 2022-02-05 | End: 2022-02-05 | Stop reason: SURG

## 2022-02-05 RX ORDER — ONDANSETRON 2 MG/ML
4 INJECTION INTRAMUSCULAR; INTRAVENOUS ONCE AS NEEDED
Status: DISCONTINUED | OUTPATIENT
Start: 2022-02-05 | End: 2022-02-05 | Stop reason: HOSPADM

## 2022-02-05 RX ORDER — METOCLOPRAMIDE HYDROCHLORIDE 5 MG/ML
INJECTION INTRAMUSCULAR; INTRAVENOUS AS NEEDED
Status: DISCONTINUED | OUTPATIENT
Start: 2022-02-05 | End: 2022-02-05 | Stop reason: SURG

## 2022-02-05 RX ORDER — KETAMINE HYDROCHLORIDE 50 MG/ML
INJECTION, SOLUTION, CONCENTRATE INTRAMUSCULAR; INTRAVENOUS AS NEEDED
Status: DISCONTINUED | OUTPATIENT
Start: 2022-02-05 | End: 2022-02-05 | Stop reason: SURG

## 2022-02-05 RX ORDER — LIDOCAINE HYDROCHLORIDE 20 MG/ML
INJECTION, SOLUTION INTRAVENOUS AS NEEDED
Status: DISCONTINUED | OUTPATIENT
Start: 2022-02-05 | End: 2022-02-05 | Stop reason: SURG

## 2022-02-05 RX ORDER — PROPOFOL 10 MG/ML
VIAL (ML) INTRAVENOUS AS NEEDED
Status: DISCONTINUED | OUTPATIENT
Start: 2022-02-05 | End: 2022-02-05 | Stop reason: SURG

## 2022-02-05 RX ORDER — SODIUM CHLORIDE 0.9 % (FLUSH) 0.9 %
10 SYRINGE (ML) INJECTION AS NEEDED
Status: DISCONTINUED | OUTPATIENT
Start: 2022-02-05 | End: 2022-02-05 | Stop reason: HOSPADM

## 2022-02-05 RX ORDER — FLUTICASONE PROPIONATE 220 UG/1
2 AEROSOL, METERED RESPIRATORY (INHALATION)
Qty: 1 EACH | Refills: 1 | Status: ON HOLD | OUTPATIENT
Start: 2022-02-05 | End: 2022-08-12

## 2022-02-05 RX ADMIN — METOCLOPRAMIDE 5 MG: 5 INJECTION, SOLUTION INTRAMUSCULAR; INTRAVENOUS at 12:18

## 2022-02-05 RX ADMIN — KETAMINE HYDROCHLORIDE 25 MG: 50 INJECTION, SOLUTION INTRAMUSCULAR; INTRAVENOUS at 12:27

## 2022-02-05 RX ADMIN — LIDOCAINE HYDROCHLORIDE 40 MG: 20 INJECTION, SOLUTION INTRAVENOUS at 12:27

## 2022-02-05 RX ADMIN — PROPOFOL 100 MG: 10 INJECTION, EMULSION INTRAVENOUS at 12:34

## 2022-02-05 RX ADMIN — ONDANSETRON 4 MG: 2 INJECTION INTRAMUSCULAR; INTRAVENOUS at 12:17

## 2022-02-05 RX ADMIN — PROPOFOL 100 MG: 10 INJECTION, EMULSION INTRAVENOUS at 12:27

## 2022-02-05 RX ADMIN — SODIUM CHLORIDE: 9 INJECTION, SOLUTION INTRAVENOUS at 12:09

## 2022-02-05 RX ADMIN — GLYCOPYRROLATE 0.2 MG: 0.2 INJECTION, SOLUTION INTRAMUSCULAR; INTRAVITREAL at 12:09

## 2022-02-05 NOTE — ANESTHESIA POSTPROCEDURE EVALUATION
Patient: Crow Rock    Procedure Summary     Date: 02/05/22 Room / Location: [facility] OR 2 /  [facility] OR    Anesthesia Start: 1209 Anesthesia Stop:     Procedure: ESOPHAGOGASTRODUODENOSCOPY WITH FOREIGN BODY REMOVAL (N/A ) Diagnosis:     Surgeons: Levy Hester MD Provider: Rios Choudhury CRNA    Anesthesia Type: MAC ASA Status: 2          Anesthesia Type: MAC    Vitals    Sat 100  Resp 10  /57  Temp 97.6      Post Anesthesia Care and Evaluation    Patient location during evaluation: PACU  Patient participation: complete - patient participated  Level of consciousness: awake and alert and sleepy but conscious  Pain score: 0  Pain management: satisfactory to patient  Airway patency: patent  Anesthetic complications: No anesthetic complications  PONV Status: none  Cardiovascular status: acceptable and stable  Respiratory status: acceptable and nasal cannula  Hydration status: acceptable

## 2022-02-05 NOTE — ANESTHESIA PREPROCEDURE EVALUATION
Anesthesia Evaluation     Patient summary reviewed and Nursing notes reviewed   no history of anesthetic complications:  NPO Solid Status: > 8 hours  NPO Liquid Status: > 8 hours           Airway   Mallampati: II  TM distance: >3 FB  Neck ROM: full  No difficulty expected  Dental    (+) poor dentition        Pulmonary - normal exam   (+) sleep apnea,   (-) not a smoker  Cardiovascular - negative cardio ROS and normal exam  Exercise tolerance: good (4-7 METS)        Neuro/Psych  (+) psychiatric history (Asperger syndrome ),     GI/Hepatic/Renal/Endo    (+) obesity, morbid obesity, GERD poorly controlled,      ROS Comment: Esophagitis  dysphagia    Musculoskeletal (-) negative ROS    Abdominal  - normal exam   Substance History - negative use     OB/GYN negative ob/gyn ROS         Other - negative ROS       ROS/Med Hx Other: Gastroesophageal reflux disease with esophagitis  Chronic gastritis without bleeding  Dysphagia  Heartburn  Delayed gastric emptying  Esophagitis  Epigastric pain  Esophagitis, eosinophilic  Gastroparesis  Esophageal dysphagia  Esophageal stricture    Asperger syndrome                    Anesthesia Plan    ASA 2     MAC   (Risks and benefits discussed including risk of aspiration, recall and dental damage. All patient questions answered. Will continue with POC.)  intravenous induction     Anesthetic plan, all risks, benefits, and alternatives have been provided, discussed and informed consent has been obtained with: patient.    Plan discussed with CRNA.

## 2022-02-05 NOTE — H&P
[facility]   HISTORY AND PHYSICAL      Name:  C[place] Rock   Age:  26 y.o.  Sex:  male  :  1995  MRN:  [id number]   Visit Number:  [id number]  Admission Date:  2022  Date Of Service:  22  Primary Care Physician:  Silvano Cazares MD    Chief Complaint:     I cant swallow  History Of Presenting Illness:      Patient with a 1-1/2-day history of esophageal food bolus obstructing his esophagus.  He states that he cannot tolerate even swallowing saliva at this time.  He has had multiple episodes of this in the past including requiring dilation.  No chest pain.  No abdominal pain.    Review Of Systems:     General ROS: Patient denies any fevers, chills or loss of consciousness.  No complaints of generalized weakness  Psychological ROS: Denies any hallucinations and delusions.  Respiratory ROS: Denies cough or shortness of breath.   Cardiovascular ROS: Denies chest pain or palpitations. No history of exertional chest pain.   Gastrointestinal ROS: As per HPI  Genito-Urinary ROS: Denies dysuria or hematuria.  Neurological ROS: Denies any focal weakness. No loss of consciousness. Denies any numbness.   Dermatological ROS: Denies any redness or pruritis.     Past Medical History:    Past Medical History:   Diagnosis Date   • Allergy to bean     Soybeans   • Asperger syndrome    • Chronic gastritis    • Cow's milk allergy    • Delayed gastric emptying    • Diarrhea    • Dysphagia     MOTHER REPORTS PATIENT HAS RECENTLY HAS GOTTEN CHOKED EASILY AND GETS FOOD STUCK IN THROAT.   • Egg allergy    • Epigastric abdominal pain    • Esophagitis    • GERD (gastroesophageal reflux disease)    • H/O seasonal allergies    • Hearing loss     Patient's mother reported more loss on the left side but that patient has loss bilateral.  No use of hearing devices.    • Obesity    • Wears glasses    • Wheat allergy        Past Surgical history:    Past Surgical History:    Procedure Laterality Date   • EAR TUBES     • ENDOSCOPY N/A 7/15/2017    Procedure: ESOPHAGOGASTRODUODENOSCOPY with bx , removal foreign body;  Surgeon: Saroj Scott MD;  Location: [facility] OR;  Service:    • ENDOSCOPY N/A 8/24/2017    Procedure: ESOPHAGOGASTRODUODENOSCOPY ESOPHAGEAL DILATATION;  Surgeon: Saroj Scott MD;  Location: [facility] ENDOSCOPY;  Service:    • ENDOSCOPY N/A 4/9/2019    Procedure: ESOPHAGOGASTRODUODENOSCOPY FOR FOREIGN BODY, biopsy, and esophageal dilitation;  Surgeon: Tyrone Herzog MD;  Location: [facility] ENDOSCOPY;  Service: Gastroenterology   • ENDOSCOPY N/A 5/14/2019    Procedure: ESOPHAGOGASTRODUODENOSCOPY with biopsy;  Surgeon: Tyrone Herzog MD;  Location: [facility] ENDOSCOPY;  Service: Gastroenterology   • ENDOSCOPY N/A 3/5/2020    Procedure: ESOPHAGOGASTRODUODENOSCOPY;  Surgeon: Tyrone Herzog MD;  Location: [facility] ENDOSCOPY;  Service: Gastroenterology;  Laterality: N/A;   • ENDOSCOPY N/A 6/18/2020    Procedure: ESOPHAGOGASTRODUODENOSCOPY;  Surgeon: Tyrone Herzog MD;  Location: [facility] ENDOSCOPY;  Service: Gastroenterology;  Laterality: N/A;   • ENDOSCOPY N/A 5/11/2021    Procedure: ESOPHAGOGASTRODUODENOSCOPY WITH SAVORY DILITATION AND BIOPSY;  Surgeon: Nellie Xavier MD;  Location: [facility] ENDOSCOPY;  Service: Gastroenterology;  Laterality: N/A;   • ENDOSCOPY N/A 6/7/2021    Procedure: ESOPHAGOGASTRODUODENOSCOPY WITH SAVORY DILATATION AND BIOPSY ;  Surgeon: Nellie Xavier MD;  Location: [facility] ENDOSCOPY;  Service: Gastroenterology;  Laterality: N/A;   • ENDOSCOPY N/A 7/27/2021    Procedure: ESOPHAGOGASTRODUODENOSCOPY WITH DILATATION AND BIOPSY;  Surgeon: Nellie Xavier MD;  Location: [facility] ENDOSCOPY;  Service: Gastroenterology;  Laterality: N/A;   • UPPER GASTROINTESTINAL ENDOSCOPY  04/09/2019   • UPPER GASTROINTESTINAL ENDOSCOPY  05/14/2019       Social History:    Social History     Socioeconomic History   • Marital status: Single   Tobacco Use   •  Smoking status: Never Smoker   • Smokeless tobacco: Never Used   Vaping Use   • Vaping Use: Never used   Substance and Sexual Activity   • Alcohol use: No   • Drug use: No   • Sexual activity: Defer       Family History:    Family History   Problem Relation Age of Onset   • No Known Problems Mother    • No Known Problems Father    • Colon cancer Neg Hx        Allergies:      Patient has no known allergies.    Home Medications:    Prior to Admission Medications     Prescriptions Last Dose Informant Patient Reported? Taking?    budesonide (PULMICORT) 1 MG/2ML nebulizer solution   No No    Use as directed in slurry after breakfast and dinner daily x 8 weeks    pantoprazole (PROTONIX) 40 MG EC tablet  Self No No    Take 1 tablet by mouth 2 (two) times a day. TAKE 1 TABLET BY MOUTH EVERY DAY 30 MINUTES BEFORE BREAKFAST             ED Medications:    Medications   sodium chloride 0.9 % flush 10 mL ( Intravenous MAR Hold 2/5/22 1213)       Vital Signs:    Temp:  [98.3 °F (36.8 °C)] 98.3 °F (36.8 °C)  Heart Rate:  [87] 87  Resp:  [18] 18  BP: (137-139)/() 139/86        02/05/22  1036   Weight: 103 kg (226 lb 12.8 oz)       Body mass index is 30.76 kg/m².    Physical Exam:      General Appearance:  Alert and cooperative, not in any acute distress.   Head:  Atraumatic and normocephalic, without obvious abnormality.   Eyes:          PERRLA, conjunctivae and sclerae normal, no Icterus. No pallor. Extra-occular movements are within normal limits.   Ears:  Ears appear intact with no abnormalities noted.   Respiratory/Lungs:   Breath sounds heard bilaterally equally.  No crackles or wheezing. No Pleural rub or bronchial breathing. Normal respiratory effort.    Cardiovascular/Heart:  Normal S1 and S2,    GI/Abdomen:   No masses, no hepatosplenomegaly. Soft, non-tender, non-distended, no hernia                 Musculoskeletal/ Extremities:   Moves all extremities well   Skin: No bleeding, bruising or rash, no induration    Psychiatric : Alert and oriented ×3.  No depression or anxiety    Neurologic: Cranial nerves 2 - 12 grossly intact, sensation intact, Motor power is normal and equal bilaterally.       EKG:          Labs:    Lab Results (last 24 hours)     Procedure Component Value Units Date/Time    COVID PRE-OP / PRE-PROCEDURE SCREENING ORDER (NO ISOLATION) - Swab, Nasal Cavity [[id number]]  (Normal) Collected: 02/05/22 1121    Specimen: Swab from Nasal Cavity Updated: 02/05/22 1157    Narrative:      The following orders were created for panel order COVID PRE-OP / PRE-PROCEDURE SCREENING ORDER (NO ISOLATION) - Swab, Nasal Cavity.  Procedure                               Abnormality         Status                     ---------                               -----------         ------                     COVID-19,[employer] [facility]-LUIS...[[id number]]  Normal              Final result                 Please view results for these tests on the individual orders.    COVID-19,[employer] IN-HOUSE,Nasal Swab No Transport Media 3-4 HR TAT - Swab, Nasal Cavity [[id number]]  (Normal) Collected: 02/05/22 1121    Specimen: Swab from Nasal Cavity Updated: 02/05/22 1157     COVID19 Not Detected    Narrative:      Fact sheet for providers: https://www.fda.gov/media/363033/download     Fact sheet for patients: https://www.fda.gov/media/806379/download    Test performed by PCR.    Consider negative results in combination with clinical observations, patient history, and epidemiological information.    Comprehensive Metabolic Panel [[id number]] Collected: 02/05/22 1121    Specimen: Blood Updated: 02/05/22 1147     Glucose 97 mg/dL      BUN 13 mg/dL      Creatinine 1.06 mg/dL      Sodium 140 mmol/L      Potassium 3.7 mmol/L      Chloride 102 mmol/L      CO2 25.9 mmol/L      Calcium 9.8 mg/dL      Total Protein 7.8 g/dL      Albumin 4.90 g/dL      ALT (SGPT) 22 U/L      AST (SGOT) 19 U/L      Alkaline Phosphatase 107 U/L      Total Bilirubin 1.1 mg/dL      eGFR  Non  Amer 83 mL/min/1.73      Globulin 2.9 gm/dL      A/G Ratio 1.7 g/dL      BUN/Creatinine Ratio 12.3     Anion Gap 12.1 mmol/L     Narrative:      GFR Normal >60  Chronic Kidney Disease <60  Kidney Failure <15      CBC & Differential [[id number]]  (Normal) Collected: 02/05/22 1121    Specimen: Blood Updated: 02/05/22 1132    Narrative:      The following orders were created for panel order CBC & Differential.  Procedure                               Abnormality         Status                     ---------                               -----------         ------                     CBC Auto Differential[[id number]]        Normal              Final result                 Please view results for these tests on the individual orders.    CBC Auto Differential [[id number]]  (Normal) Collected: 02/05/22 1121    Specimen: Blood Updated: 02/05/22 1132     WBC 6.11 10*3/mm3      RBC 5.29 10*6/mm3      Hemoglobin 16.5 g/dL      Hematocrit 48.4 %      MCV 91.5 fL      MCH 31.2 pg      MCHC 34.1 g/dL      RDW 12.3 %      RDW-SD 41.2 fl      MPV 10.9 fL      Platelets 224 10*3/mm3      Neutrophil % 53.1 %      Lymphocyte % 28.5 %      Monocyte % 11.8 %      Eosinophil % 5.6 %      Basophil % 0.7 %      Immature Grans % 0.3 %      Neutrophils, Absolute 3.25 10*3/mm3      Lymphocytes, Absolute 1.74 10*3/mm3      Monocytes, Absolute 0.72 10*3/mm3      Eosinophils, Absolute 0.34 10*3/mm3      Basophils, Absolute 0.04 10*3/mm3      Immature Grans, Absolute 0.02 10*3/mm3      nRBC 0.0 /100 WBC           Radiology:    Imaging Results (Last 72 Hours)     ** No results found for the last 72 hours. **          Assessment:    Esophageal foreign body causing obstruction    Plan:     I recommend an EGD for removal of his esophageal foreign body with possible dilation as necessary.  He has required dilation in the past.  He understands the procedure as well as the risk of bleeding and perforation as well as the risk of aspiration and he  understands the ramifications of these potential complications and he wishes to proceed.  In the past he was recommended to take Flonase which he was unable to do due to insurance issues.    Levy Hester MD  02/05/22  12:25 EST

## 2022-02-10 LAB
LAB AP CASE REPORT: NORMAL
PATH REPORT.FINAL DX SPEC: NORMAL

## 2022-02-11 NOTE — ED PROVIDER NOTES
Subjective   History of Present Illness    Chief Complaint: Concern for foreign body esophageal  History of Present Illness: 26-year-old male history of eosinophilic esophagitis requiring multiple dilations, here with approximately 36 hours of feeling of food bolus obstruction.  States it began when he was eating chicken.  Denies bleeding or fever.  Last esophageal foreign body 2 years ago.  Onset: See above timeline  Duration: Persistent  Exacerbating / Alleviating factors: Worse with p.o. intake  Associated symptoms: None      Nurses Notes reviewed and agree, including vitals, allergies, social history and prior medical history.     REVIEW OF SYSTEMS: All systems reviewed and not pertinent unless noted.    Positive for: Midsternal fullness, inability to tolerate p.o. intake, concern for foreign body obstruction in esophagus    Negative for: Fever cough hematemesis shortness of breath  Review of Systems    Past Medical History:   Diagnosis Date   • Allergy to bean 2021    Soybeans   • Asperger syndrome    • Chronic gastritis    • Cow's milk allergy 2021   • Delayed gastric emptying    • Diarrhea    • Dysphagia     MOTHER REPORTS PATIENT HAS RECENTLY HAS GOTTEN CHOKED EASILY AND GETS FOOD STUCK IN THROAT.   • Egg allergy 2021   • Epigastric abdominal pain    • Esophagitis    • GERD (gastroesophageal reflux disease)    • H/O seasonal allergies    • Hearing loss     Patient's mother reported more loss on the left side but that patient has loss bilateral.  No use of hearing devices.    • Obesity    • Wears glasses    • Wheat allergy 2021       No Known Allergies    Past Surgical History:   Procedure Laterality Date   • EAR TUBES     • ENDOSCOPY N/A 7/15/2017    Procedure: ESOPHAGOGASTRODUODENOSCOPY with bx , removal foreign body;  Surgeon: Saroj Scott MD;  Location: [facility];  Service:    • ENDOSCOPY N/A 8/24/2017    Procedure: ESOPHAGOGASTRODUODENOSCOPY ESOPHAGEAL DILATATION;  Surgeon: Saroj Scott MD;   Location: [facility] ENDOSCOPY;  Service:    • ENDOSCOPY N/A 4/9/2019    Procedure: ESOPHAGOGASTRODUODENOSCOPY FOR FOREIGN BODY, biopsy, and esophageal dilitation;  Surgeon: Tyrone Herzog MD;  Location: [facility] ENDOSCOPY;  Service: Gastroenterology   • ENDOSCOPY N/A 5/14/2019    Procedure: ESOPHAGOGASTRODUODENOSCOPY with biopsy;  Surgeon: Tyrone Herzog MD;  Location: [facility] ENDOSCOPY;  Service: Gastroenterology   • ENDOSCOPY N/A 3/5/2020    Procedure: ESOPHAGOGASTRODUODENOSCOPY;  Surgeon: Tyrone Herzog MD;  Location: [facility] ENDOSCOPY;  Service: Gastroenterology;  Laterality: N/A;   • ENDOSCOPY N/A 6/18/2020    Procedure: ESOPHAGOGASTRODUODENOSCOPY;  Surgeon: Tyrone Herzog MD;  Location: [facility] ENDOSCOPY;  Service: Gastroenterology;  Laterality: N/A;   • ENDOSCOPY N/A 5/11/2021    Procedure: ESOPHAGOGASTRODUODENOSCOPY WITH SAVORY DILITATION AND BIOPSY;  Surgeon: Nellie Xavier MD;  Location: [facility] ENDOSCOPY;  Service: Gastroenterology;  Laterality: N/A;   • ENDOSCOPY N/A 6/7/2021    Procedure: ESOPHAGOGASTRODUODENOSCOPY WITH SAVORY DILATATION AND BIOPSY ;  Surgeon: Nellie Xavier MD;  Location: [facility] ENDOSCOPY;  Service: Gastroenterology;  Laterality: N/A;   • ENDOSCOPY N/A 7/27/2021    Procedure: ESOPHAGOGASTRODUODENOSCOPY WITH DILATATION AND BIOPSY;  Surgeon: Nellie Xavier MD;  Location: [facility] ENDOSCOPY;  Service: Gastroenterology;  Laterality: N/A;   • ENDOSCOPY N/A 2/5/2022    Procedure: ESOPHAGOGASTRODUODENOSCOPY WITH FOREIGN BODY REMOVAL AND BIOPSY;  Surgeon: Levy Hester MD;  Location: [facility] OR;  Service: General;  Laterality: N/A;   • UPPER GASTROINTESTINAL ENDOSCOPY  04/09/2019   • UPPER GASTROINTESTINAL ENDOSCOPY  05/14/2019       Family History   Problem Relation Age of Onset   • No Known Problems Mother    • No Known Problems Father    • Colon cancer Neg Hx        Social History     Socioeconomic History   • Marital status: Single   Tobacco Use   • Smoking status:  Never Smoker   • Smokeless tobacco: Never Used   Vaping Use   • Vaping Use: Never used   Substance and Sexual Activity   • Alcohol use: No   • Drug use: No   • Sexual activity: Defer           Objective   Physical Exam    CONSTITUTIONAL: Well developed, nontoxic 26-year-old  male,  in no acute distress.  VITAL SIGNS: per nursing, reviewed and noted  SKIN: exposed skin with no rashes, ulcerations or petechiae.  EYES: perrla. EOMI.  ENT: Normal voice. moist mucous membranes.  Spitting secretions  RESPIRATORY:  No increased work of breathing. No retractions.  Chest clear to station bilaterally  CARDIOVASCULAR:  regular rate and rhythm, no murmurs.  Good Peripheral pulses. Good cap refill to extremities.   GI: Abdomen soft, nontender, normal bowel sounds. No hernia. No ascites.  MUSCULOSKELETAL:  No tenderness. Full ROM. Strength and tone grossly normal.  no spasms. no neck or back tenderness or spasm.   NEUROLOGIC: Alert, oriented x 3. No gross deficits. GCS 15.   PSYCH: appropriate affect.  : no bladder tenderness or distention, no CVA tenderness      Procedures     No attending physician procedures were performed on this patient.      ED Course  ED Course as of 02/11/22 0306 Fri Feb 11, 2022   0305 COVID19: Not Detected [PF]   0305 WBC: 6.11 [PF]   0305 Hemoglobin: 16.5 [PF]   0305 Hematocrit: 48.4 [PF]   0305 Platelets: 224 [PF]   0305 Glucose: 97 [PF]   0305 BUN: 13 [PF]   0305 Creatinine: 1.06 [PF]   0305 Sodium: 140 [PF]   0305 Potassium: 3.7 [PF]   0305 Chloride: 102 [PF]   0305 CO2: 25.9 [PF]   0305 Calcium: 9.8 [PF]   0305 Total Protein: 7.8 [PF]   0305 Albumin: 4.90 [PF]   0305 ALT (SGPT): 22 [PF]   0305 AST (SGOT): 19 [PF]   0305 Alkaline Phosphatase: 107 [PF]   0305 Total Bilirubin: 1.1 [PF]      ED Course User Index  [PF] Layo Celestin,       Telemetry reveals sinus rhythm with no ectopy normal rate.                                           MDM  Consulted with general surgeon on-call   Mir, will take to endoscopy.  Preop labs pending.  Final diagnoses:   Esophageal obstruction due to food impaction       ED Disposition  ED Disposition     ED Disposition Condition Comment    Send to OR            Silvano Cazares MD  [address] [place] [address] [phone]               Medication List      New Prescriptions    fluticasone 220 MCG/ACT inhaler  Commonly known as: FLOVENT HFA  Inhale 2 puffs 2 (Two) Times a Day for 30 days.        Stop    budesonide 1 MG/2ML nebulizer solution  Commonly known as: PULMICORT           Where to Get Your Medications      You can get these medications from any pharmacy    Bring a paper prescription for each of these medications  · fluticasone 220 MCG/ACT inhaler          Layo Celestin,   02/11/22 0308

## 2022-03-01 NOTE — PROGRESS NOTES
"Patient: [place]    YOB: 1995    Date: 03/03/2022    Primary Care Provider: Silvano Cazares MD    Reason for Consultation: Follow-up EGD    Chief Complaint   Patient presents with   • Post-op     EGD       History of present illness:  I saw the patient in the office today as a followup from their recent EGD with biopsy. They state that they have done well and have no complaints.  He is having no further dysphagia and is feeling better on his medication.  He has not had any allergy testing yet.    The following portions of the patient's history were reviewed and updated as appropriate: allergies, current medications, past family history, past medical history, past social history, past surgical history and problem list.    Review of Systems   Constitutional: Negative for activity change, appetite change and chills.   HENT: Negative for congestion and hearing loss.    Respiratory: Negative for cough, choking, chest tightness and shortness of breath.    Cardiovascular: Negative for chest pain, palpitations and leg swelling.   Endocrine: Negative for cold intolerance and heat intolerance.   Genitourinary: Negative for dysuria, flank pain and frequency.   Musculoskeletal: Negative for back pain and myalgias.   Skin: Negative for color change and rash.   Neurological: Negative for seizures, facial asymmetry, light-headedness, numbness and headaches.   Psychiatric/Behavioral: Negative for agitation, behavioral problems and confusion.       Vital Signs:   Vitals:    03/03/22 1500   BP: 112/88   BP Location: Left arm   Patient Position: Sitting   Cuff Size: Adult   Pulse: 74   Temp: 98.2 °F (36.8 °C)   TempSrc: Infrared   SpO2: 98%   Weight: 102 kg (225 lb)   Height: 182.9 cm (72\")       Allergies:  No Known Allergies    Medications:    Current Outpatient Medications:   •  fluticasone (FLOVENT HFA) 220 MCG/ACT inhaler, Inhale 2 puffs 2 (Two) Times a Day for 30 days., Disp: 1 each, Rfl: 1  •  " pantoprazole (PROTONIX) 40 MG EC tablet, Take 1 tablet by mouth 2 (two) times a day. TAKE 1 TABLET BY MOUTH EVERY DAY 30 MINUTES BEFORE BREAKFAST, Disp: 60 tablet, Rfl: 2    Physical Exam:   General Appearance:    Alert, cooperative, in no acute distress   Abdomen:    Benign            Cor: Regular rate and rhythm      Results Review:   I reviewed the patient's new clinical results.  Pathology was reviewed    Review of Systems was reviewed and confirmed as accurate as documented by the MA.    Assessment / Plan:    1. Eosinophilic esophagitis      Finish the course of Flovent.  I will also send him to an allergist.  He will follow-up with me as needed.    Electronically signed by Levy Hester MD  03/03/22

## 2022-03-03 ENCOUNTER — DOCUMENTATION (OUTPATIENT)
Dept: NUTRITION | Facility: HOSPITAL | Age: 27
End: 2022-03-03

## 2022-03-03 ENCOUNTER — OFFICE VISIT (OUTPATIENT)
Dept: SURGERY | Facility: CLINIC | Age: 27
End: 2022-03-03

## 2022-03-03 VITALS
SYSTOLIC BLOOD PRESSURE: 112 MMHG | OXYGEN SATURATION: 98 % | BODY MASS INDEX: 30.48 KG/M2 | DIASTOLIC BLOOD PRESSURE: 88 MMHG | TEMPERATURE: 98.2 F | WEIGHT: 225 LBS | HEART RATE: 74 BPM | HEIGHT: 72 IN

## 2022-03-03 DIAGNOSIS — K20.0 EOSINOPHILIC ESOPHAGITIS: Primary | ICD-10-CM

## 2022-03-03 PROCEDURE — 99213 OFFICE O/P EST LOW 20 MIN: CPT | Performed by: SURGERY

## 2022-03-03 NOTE — PROGRESS NOTES
RD previously called pt regarding nutrition appt. RD will send letter in the mail and wait two weeks before closing. Thank you for the initial referral. RD available PRN.

## 2022-08-12 ENCOUNTER — HOSPITAL ENCOUNTER (INPATIENT)
Facility: HOSPITAL | Age: 27
LOS: 4 days | Discharge: HOME | End: 2022-08-16
Attending: PSYCHIATRY & NEUROLOGY | Admitting: PSYCHIATRY & NEUROLOGY

## 2022-08-12 PROBLEM — F33.9 MAJOR DEPRESSION, RECURRENT: Status: ACTIVE | Noted: 2022-08-12

## 2022-08-12 PROBLEM — F33.2 SEVERE EPISODE OF RECURRENT MAJOR DEPRESSIVE DISORDER, WITHOUT PSYCHOTIC FEATURES (HCC): Status: ACTIVE | Noted: 2022-08-12

## 2022-08-12 PROBLEM — F33.9 MAJOR DEPRESSION, RECURRENT (HCC): Status: RESOLVED | Noted: 2022-08-12 | Resolved: 2022-08-12

## 2022-08-12 PROBLEM — F84.0 AUTISM SPECTRUM DISORDER: Status: ACTIVE | Noted: 2022-08-12

## 2022-08-12 PROBLEM — K21.00 GASTROESOPHAGEAL REFLUX DISEASE WITH ESOPHAGITIS: Status: ACTIVE | Noted: 2017-08-02

## 2022-08-12 LAB
HBA1C MFR BLD: 5.2 % (ref 4.8–5.6)
QT INTERVAL: 380 MS
QTC INTERVAL: 430 MS
T4 FREE SERPL-MCNC: 1.49 NG/DL (ref 0.93–1.7)
TROPONIN T SERPL-MCNC: <0.01 NG/ML (ref 0–0.03)
TSH SERPL DL<=0.05 MIU/L-ACNC: 2.1 UIU/ML (ref 0.27–4.2)

## 2022-08-12 PROCEDURE — 99223 1ST HOSP IP/OBS HIGH 75: CPT | Performed by: PSYCHIATRY & NEUROLOGY

## 2022-08-12 PROCEDURE — 94760 N-INVAS EAR/PLS OXIMETRY 1: CPT

## 2022-08-12 PROCEDURE — 94799 UNLISTED PULMONARY SVC/PX: CPT

## 2022-08-12 PROCEDURE — 93005 ELECTROCARDIOGRAM TRACING: CPT | Performed by: PSYCHIATRY & NEUROLOGY

## 2022-08-12 PROCEDURE — 83036 HEMOGLOBIN GLYCOSYLATED A1C: CPT | Performed by: PSYCHIATRY & NEUROLOGY

## 2022-08-12 PROCEDURE — 84484 ASSAY OF TROPONIN QUANT: CPT | Performed by: PSYCHIATRY & NEUROLOGY

## 2022-08-12 PROCEDURE — 84443 ASSAY THYROID STIM HORMONE: CPT | Performed by: PSYCHIATRY & NEUROLOGY

## 2022-08-12 PROCEDURE — 84439 ASSAY OF FREE THYROXINE: CPT | Performed by: PSYCHIATRY & NEUROLOGY

## 2022-08-12 PROCEDURE — 99222 1ST HOSP IP/OBS MODERATE 55: CPT | Performed by: INTERNAL MEDICINE

## 2022-08-12 RX ORDER — PANTOPRAZOLE SODIUM 40 MG/1
40 TABLET, DELAYED RELEASE ORAL
Status: DISCONTINUED | OUTPATIENT
Start: 2022-08-12 | End: 2022-08-16 | Stop reason: HOSPADM

## 2022-08-12 RX ORDER — TRAZODONE HYDROCHLORIDE 50 MG/1
50 TABLET ORAL NIGHTLY PRN
Status: DISCONTINUED | OUTPATIENT
Start: 2022-08-12 | End: 2022-08-16 | Stop reason: HOSPADM

## 2022-08-12 RX ORDER — LOPERAMIDE HYDROCHLORIDE 2 MG/1
2 CAPSULE ORAL
Status: DISCONTINUED | OUTPATIENT
Start: 2022-08-12 | End: 2022-08-16 | Stop reason: HOSPADM

## 2022-08-12 RX ORDER — BENZTROPINE MESYLATE 1 MG/ML
1 INJECTION INTRAMUSCULAR; INTRAVENOUS ONCE AS NEEDED
Status: DISCONTINUED | OUTPATIENT
Start: 2022-08-12 | End: 2022-08-16 | Stop reason: HOSPADM

## 2022-08-12 RX ORDER — ECHINACEA PURPUREA EXTRACT 125 MG
2 TABLET ORAL AS NEEDED
Status: DISCONTINUED | OUTPATIENT
Start: 2022-08-12 | End: 2022-08-16 | Stop reason: HOSPADM

## 2022-08-12 RX ORDER — BUDESONIDE 0.5 MG/2ML
0.5 INHALANT ORAL
Status: DISCONTINUED | OUTPATIENT
Start: 2022-08-12 | End: 2022-08-16

## 2022-08-12 RX ORDER — FLUTICASONE PROPIONATE 220 UG/1
2 AEROSOL, METERED RESPIRATORY (INHALATION)
COMMUNITY

## 2022-08-12 RX ORDER — IBUPROFEN 400 MG/1
400 TABLET ORAL EVERY 6 HOURS PRN
Status: DISCONTINUED | OUTPATIENT
Start: 2022-08-12 | End: 2022-08-16 | Stop reason: HOSPADM

## 2022-08-12 RX ORDER — BENZTROPINE MESYLATE 1 MG/1
2 TABLET ORAL ONCE AS NEEDED
Status: DISCONTINUED | OUTPATIENT
Start: 2022-08-12 | End: 2022-08-16 | Stop reason: HOSPADM

## 2022-08-12 RX ORDER — ONDANSETRON 4 MG/1
4 TABLET, FILM COATED ORAL EVERY 6 HOURS PRN
Status: DISCONTINUED | OUTPATIENT
Start: 2022-08-12 | End: 2022-08-16 | Stop reason: HOSPADM

## 2022-08-12 RX ORDER — HYDROXYZINE 50 MG/1
50 TABLET, FILM COATED ORAL EVERY 6 HOURS PRN
Status: DISCONTINUED | OUTPATIENT
Start: 2022-08-12 | End: 2022-08-16 | Stop reason: HOSPADM

## 2022-08-12 RX ORDER — ALUMINA, MAGNESIA, AND SIMETHICONE 2400; 2400; 240 MG/30ML; MG/30ML; MG/30ML
15 SUSPENSION ORAL EVERY 6 HOURS PRN
Status: DISCONTINUED | OUTPATIENT
Start: 2022-08-12 | End: 2022-08-16 | Stop reason: HOSPADM

## 2022-08-12 RX ORDER — BENZONATATE 100 MG/1
100 CAPSULE ORAL 3 TIMES DAILY PRN
Status: DISCONTINUED | OUTPATIENT
Start: 2022-08-12 | End: 2022-08-16 | Stop reason: HOSPADM

## 2022-08-12 RX ORDER — FAMOTIDINE 20 MG/1
20 TABLET, FILM COATED ORAL 2 TIMES DAILY PRN
Status: DISCONTINUED | OUTPATIENT
Start: 2022-08-12 | End: 2022-08-16 | Stop reason: HOSPADM

## 2022-08-12 RX ORDER — ACETAMINOPHEN 325 MG/1
650 TABLET ORAL EVERY 6 HOURS PRN
Status: DISCONTINUED | OUTPATIENT
Start: 2022-08-12 | End: 2022-08-16 | Stop reason: HOSPADM

## 2022-08-12 RX ADMIN — FAMOTIDINE 20 MG: 20 TABLET, FILM COATED ORAL at 14:19

## 2022-08-12 RX ADMIN — PANTOPRAZOLE SODIUM 40 MG: 40 TABLET, DELAYED RELEASE ORAL at 11:32

## 2022-08-12 RX ADMIN — TRAZODONE HYDROCHLORIDE 50 MG: 50 TABLET ORAL at 22:10

## 2022-08-12 RX ADMIN — HYDROXYZINE HYDROCHLORIDE 50 MG: 50 TABLET ORAL at 08:20

## 2022-08-12 RX ADMIN — SERTRALINE 50 MG: 50 TABLET, FILM COATED ORAL at 11:32

## 2022-08-12 RX ADMIN — ALUMINUM HYDROXIDE, MAGNESIUM HYDROXIDE, AND DIMETHICONE 15 ML: 400; 400; 40 SUSPENSION ORAL at 14:19

## 2022-08-12 RX ADMIN — HYDROXYZINE HYDROCHLORIDE 50 MG: 50 TABLET ORAL at 20:09

## 2022-08-12 NOTE — PLAN OF CARE
Goal Outcome Evaluation:  Plan of Care Reviewed With: patient  Patient Agreement with Plan of Care: agrees     Progress: no change  Outcome Evaluation: new admit

## 2022-08-12 NOTE — PLAN OF CARE
Goal Outcome Evaluation:  Plan of Care Reviewed With: patient  Patient Agreement with Plan of Care: agrees  Consent Given to Review Plan with: Mother  Progress: no change  Outcome Evaluation: Therapist met with Patient to review care plan, social history, and aftercare recommendations; Patient agreeable.      Problem: Adult Behavioral Health Plan of Care  Goal: Plan of Care Review  Outcome: Ongoing, Progressing  Flowsheets (Taken 8/12/2022 1056)  Consent Given to Review Plan with: Mother  Progress: no change  Plan of Care Reviewed With: patient  Patient Agreement with Plan of Care: agrees  Outcome Evaluation:   Therapist met with Patient to review care plan, social history, and aftercare recommendations   Patient agreeable.  Goal: Patient-Specific Goal (Individualization)  Outcome: Ongoing, Progressing  Flowsheets  Taken 8/12/2022 1056  Patient-Specific Goals (Include Timeframe): Identify 2-3 coping skills, address relapse prevention methods, complete aftercare plan, and deny SI/HI prior to discharge.  Individualized Care Needs: Therapist to offer 1-4 therapy sessions, aftercare planning, safety planning, family education, group therapy, and brief CBT/MI intervntions.  Anxieties, Fears or Concerns: none voiced  Taken 8/12/2022 0930  Patient Personal Strengths:   resilient   resourceful   self-reliant  Patient Vulnerabilities:   poor impulse control   lacks insight into illness   family/relationship conflict   history of unsuccessful treatment  Goal: Optimized Coping Skills in Response to Life Stressors  Outcome: Ongoing, Progressing  Flowsheets (Taken 8/12/2022 1056)  Optimized Coping Skills in Response to Life Stressors: making progress toward outcome  Intervention: Promote Effective Coping Strategies  Flowsheets (Taken 8/12/2022 1056)  Supportive Measures:   active listening utilized   counseling provided   verbalization of feelings encouraged   goal-setting facilitated  Goal: Develops/Participates in Therapeutic  [place] to Support Successful Transition  Outcome: Ongoing, Progressing  Flowsheets (Taken 8/12/2022 1056)  Develops/Participates in Therapeutic [place] to Support Successful Transition: making progress toward outcome  Intervention: Foster Therapeutic [place]  Flowsheets (Taken 8/12/2022 1056)  Trust Relationship/Rapport:   care explained   choices provided   empathic listening provided   questions answered   reassurance provided   thoughts/feelings acknowledged   questions encouraged   emotional support provided  Intervention: Mutually Develop Transition Plan  Flowsheets (Taken 8/12/2022 1056)  Outpatient/Agency/Support Group Needs:   outpatient counseling   outpatient medication management  Discharge Coordination/Progress:   Therapist met with [place] to complete discharge needs assessment   Patient agreeable.  Transition Support: follow-up care discussed  Transportation Anticipated: family or friend will provide  Anticipated Discharge Disposition: home or self-care  Transportation Concerns: no car  Current Discharge Risk: psychiatric illness  Concerns to be Addressed:   relationship   cognitive/perceptual   mental health  Readmission Within the Last 30 Days: no previous admission in last 30 days  Patient/Family Anticipated Services at Transition: outpatient care  Patient's Choice of Community Agency(s): [facility]  Patient/Family Anticipates Transition to: home  Offered/Gave Vendor List: no     DATA: Therapist met individually with patient this date to introduce role and to discuss hospitalization expectations. Patient agreeable.      Patient signed consent for his mother, Ethel. This therapist spoke with Ethel today. She appears supportive. She states that she lives close to the Patient and assists him with any needs. She reported no major concerns other than Patient's depression and self-harm. We discussed safety planning today. This therapist made her aware that Patient should not have any  access to guns, other weapons, knives, sharp objects, or other harmful objects. Therapist also made her aware that medications should be secured to prevent overdose. She is agreeable to secure these things.     Patient is agreeable to follow up with [facility]. We checked with them to assure that his copay would be manageable.     Clinical Maneuvering/Intervention:     Therapist assisted patient in processing above session content; acknowledged and normalized patient’s thoughts, feelings, and concerns.  Discussed the therapist/patient relationship and explain the parameters and limitations of relative confidentiality.  Also discussed the importance of active participation, and honesty to the treatment process.  Encouraged the patient to discuss/vent their feelings, frustrations, and fears concerning their ongoing medical issues and validated their feelings.     Discussed the importance of finding enjoyable activities and coping skills that the patient can engage in a regular basis. Discussed healthy coping skills such as distraction, self love, grounding, thought challenges/reframing, etc.  Provided patient with list of healthy coping skills this date. Discussed the importance of medication compliance.  Praised the patient for seeking help and spent the majority of the session building rapport.       Allowed patient to freely discuss issues without interruption or judgment. Provided safe, confidential environment to facilitate the development of positive therapeutic relationship and encourage open, honest communication.      Therapist addressed discharge safety planning this date. Assisted patient in identifying risk factors which would indicate the need for higher level of care after discharge;  including thoughts to harm self or others and/or self-harming behavior. Encouraged patient to call 911, or present to the nearest emergency room should any of these events occur. Discussed crisis intervention  services and means to access.  Encouraged securing any objects of harm.       Therapist completed integrated summary, treatment plan, and initiated social history this date.  Therapist is strongly encouraging family involvement in treatment.       ASSESSMENT: [place] is a 27 year old  male living in [facility] in his own apartment. Patient's mother lives very close by. Patient attended some college courses, and currently receives disability benefits. Patient presented to the ED due to SI. Patient reports that this is his first psychiatric admission. Patient reports that he recently had a falling out with two very close friends because he was using their photos to talk to other men online. He states that they found out and were very upset about this. Patient states that the guilt of doing this makes him no longer want to be alive. Patient states that he feels badly that he has done this to his friends, and he also is confused as to why doing so brought him excitement as he identifies himself as a heterosexual male. Patient states that he struggles with severe guilt in several different areas of his life. He states that he feels guilt about receiving disability benefits, even though he knows that he is unable to work due to his autism diagnosis. He states that he feels guilt in relation to his interest in super hero movies because he learned that the special effects people who work on the movies are mistreated. He gave several other examples of his struggles with guilt as well. Patient struggles to make good eye contact. He struggles to answer some questions without thinking about them thoroughly first. Patient was embarrassed to discuss many issues, and wants his privacy to be respected. This therapist reassured Patient tat this information will be kept confidential. Patient was calm and cooperative with assessment.     PLAN:       Patient to remain hospitalized this date.     Treatment team  will focus efforts on stabilizing patient's acute symptoms while providing education on healthy coping and crisis management to reduce hospitalizations.   Patient requires daily psychiatrist evaluation and 24/7 nursing supervision to promote patient  safety.     Therapist will offer 1-4 individual sessions, 1 therapy group daily, family education, and appropriate referral.    Therapist recommends outpatient medication management and therapy.

## 2022-08-12 NOTE — CONSULTS
Inpatient Cardiology Consult  Consult performed by: German Gonzalez MD  Consult ordered by: Abilio Styles MD        Date of Admit: 2022  Date of Consult: 22  Provider, No Known  C[place] Rock  1995  Consulting Physician: Dr. German Gonzalez MD    Cardiology consultation    Reason for consultation: abnormal EKG, chest pain    Assessment:  1. Chest pain  2. Abnormal EKG  3. Gastroesophageal reflux disease      Recommendations:  1. Patient with history of reflux issues in the past with multiple scopes done.  Episode of discomfort in the chest after eating lunch today.  EKG did not show any acute ischemic changes but had Q waves noted inferiorly.  No previous EKG found.  Patient initial set of enzymes was negative.  Echocardiogram for any wall motion abnormality will be recommended.  If decreased ejection fraction, may get a stress test done.  2.   3. Strong suspicion for GI related discomfort.  As pulmonary infection needs to be ruled out also.  May need to have GI evaluation for repeat scope.          Subjective       History of Present Illness    [place] is a 27 y.o. male with past medical history significant for GERD. He presented to [facility] on 22 due to depression. Cardiology was consulted for an episode of chest pain and abnormal EKG. Today, after eating lunch he developed a tightness in his chest which lasted for several minutes. He reports this was associated with reflux and heart burn. He has a history of GERD and is prescribed Protonix at home. After this episode today Maalox and Pepcid were administered. The patient's symptoms resolved. He denies any syncope, palpitations, leg edema, or shortness of breath. He denies any known cardiac history. He does report his dad  of an MI when he was in his 50's. The patient reports he has had multiple EGD's in the past with the most recent in February of this year. Troponin was negative. EKG did not reveal any acute  ischemic changes but did reveal Q waves in the inferior leads with no EKG prior to admission for comparison.      Cardiac risk factors:Positive family Hx. of premature athersclerotivc disease.        Past Medical History:   Diagnosis Date   • Allergy to bean 2021    Soybeans   • Asperger syndrome    • Chronic gastritis    • Cow's milk allergy 2021   • Delayed gastric emptying    • Diarrhea    • Dysphagia     MOTHER REPORTS PATIENT HAS RECENTLY HAS GOTTEN CHOKED EASILY AND GETS FOOD STUCK IN THROAT.   • Egg allergy 2021   • Epigastric abdominal pain    • Esophagitis    • GERD (gastroesophageal reflux disease)    • H/O seasonal allergies    • Hearing loss     Patient's mother reported more loss on the left side but that patient has loss bilateral.  No use of hearing devices.    • Obesity    • Self-injurious behavior    • Wears glasses    • Wheat allergy 2021     Past Surgical History:   Procedure Laterality Date   • EAR TUBES     • ENDOSCOPY N/A 7/15/2017    Procedure: ESOPHAGOGASTRODUODENOSCOPY with bx , removal foreign body;  Surgeon: Saroj Scott MD;  Location: [facility] OR;  Service:    • ENDOSCOPY N/A 8/24/2017    Procedure: ESOPHAGOGASTRODUODENOSCOPY ESOPHAGEAL DILATATION;  Surgeon: Saroj Scott MD;  Location: [facility] ENDOSCOPY;  Service:    • ENDOSCOPY N/A 4/9/2019    Procedure: ESOPHAGOGASTRODUODENOSCOPY FOR FOREIGN BODY, biopsy, and esophageal dilitation;  Surgeon: Tyrone Herzog MD;  Location: [facility] ENDOSCOPY;  Service: Gastroenterology   • ENDOSCOPY N/A 5/14/2019    Procedure: ESOPHAGOGASTRODUODENOSCOPY with biopsy;  Surgeon: Tyrone Herzog MD;  Location: [facility] ENDOSCOPY;  Service: Gastroenterology   • ENDOSCOPY N/A 3/5/2020    Procedure: ESOPHAGOGASTRODUODENOSCOPY;  Surgeon: Tyrone Herzog MD;  Location: [facility] ENDOSCOPY;  Service: Gastroenterology;  Laterality: N/A;   • ENDOSCOPY N/A 6/18/2020    Procedure: ESOPHAGOGASTRODUODENOSCOPY;  Surgeon: Tyrone Herzog MD;  Location: [facility] ENDOSCOPY;   Service: Gastroenterology;  Laterality: N/A;   • ENDOSCOPY N/A 5/11/2021    Procedure: ESOPHAGOGASTRODUODENOSCOPY WITH SAVORY DILITATION AND BIOPSY;  Surgeon: Nellie Xavier MD;  Location: [facility] ENDOSCOPY;  Service: Gastroenterology;  Laterality: N/A;   • ENDOSCOPY N/A 6/7/2021    Procedure: ESOPHAGOGASTRODUODENOSCOPY WITH SAVORY DILATATION AND BIOPSY ;  Surgeon: Nellie Xavier MD;  Location: [facility] ENDOSCOPY;  Service: Gastroenterology;  Laterality: N/A;   • ENDOSCOPY N/A 7/27/2021    Procedure: ESOPHAGOGASTRODUODENOSCOPY WITH DILATATION AND BIOPSY;  Surgeon: Nellie Xavier MD;  Location: [facility] ENDOSCOPY;  Service: Gastroenterology;  Laterality: N/A;   • ENDOSCOPY N/A 2/5/2022    Procedure: ESOPHAGOGASTRODUODENOSCOPY WITH FOREIGN BODY REMOVAL AND BIOPSY;  Surgeon: Levy Hester MD;  Location: [facility] OR;  Service: General;  Laterality: N/A;   • UPPER GASTROINTESTINAL ENDOSCOPY  04/09/2019   • UPPER GASTROINTESTINAL ENDOSCOPY  05/14/2019     Family History   Problem Relation Age of Onset   • No Known Problems Mother    • No Known Problems Father    • Colon cancer Neg Hx      Social History     Tobacco Use   • Smoking status: Never Smoker   • Smokeless tobacco: Never Used   Vaping Use   • Vaping Use: Never used   Substance Use Topics   • Alcohol use: No   • Drug use: No     Medications Prior to Admission   Medication Sig Dispense Refill Last Dose   • fluticasone (FLOVENT HFA) 220 MCG/ACT inhaler Inhale 2 puffs 2 (Two) Times a Day.   8/11/2022 at Unknown time     Allergies:  Patient has no known allergies.      Current Facility-Administered Medications:   •  acetaminophen (TYLENOL) tablet 650 mg, 650 mg, Oral, Q6H PRN, Sumeet Styles MD  •  aluminum-magnesium hydroxide-simethicone (MAALOX MAX) 400-400-40 MG/5ML suspension 15 mL, 15 mL, Oral, Q6H PRN, Sumeet Styles MD, 15 mL at 08/12/22 1419  •  benzonatate (TESSALON) capsule 100 mg, 100 mg, Oral, TID PRN, Sumeet Styles MD  •   benztropine (COGENTIN) tablet 2 mg, 2 mg, Oral, Once PRN **OR** benztropine (COGENTIN) injection 1 mg, 1 mg, Intramuscular, Once PRN, Sumeet Styles MD  •  budesonide (PULMICORT) nebulizer solution 0.5 mg, 0.5 mg, Nebulization, BID - RT, Sumeet Styles MD  •  famotidine (PEPCID) tablet 20 mg, 20 mg, Oral, BID PRN, Sumeet Styles MD, 20 mg at 08/12/22 1419  •  hydrOXYzine (ATARAX) tablet 50 mg, 50 mg, Oral, Q6H PRN, Sumeet Styles MD, 50 mg at 08/12/22 0820  •  ibuprofen (ADVIL,MOTRIN) tablet 400 mg, 400 mg, Oral, Q6H PRN, Sumeet Styles MD  •  loperamide (IMODIUM) capsule 2 mg, 2 mg, Oral, Q2H PRN, Sumeet Styles MD  •  magnesium hydroxide (MILK OF MAGNESIA) suspension 10 mL, 10 mL, Oral, Daily PRN, Sumete Styles MD  •  ondansetron (ZOFRAN) tablet 4 mg, 4 mg, Oral, Q6H PRN, Sumeet Styles MD  •  pantoprazole (PROTONIX) EC tablet 40 mg, 40 mg, Oral, Q AM, Abilio Styles MD, 40 mg at 08/12/22 1132  •  sertraline (ZOLOFT) tablet 50 mg, 50 mg, Oral, Daily, Abilio Styles MD, 50 mg at 08/12/22 1132  •  sodium chloride nasal spray 2 spray, 2 spray, Each Nare, PRN, Sumeet Styles MD  •  traZODone (DESYREL) tablet 50 mg, 50 mg, Oral, Nightly PRN, Sumeet Styles MD    Review of Systems   Constitutional: Negative for chills, fatigue and fever.   HENT: Negative for congestion, ear pain, rhinorrhea and sore throat.    Respiratory: Negative for cough, shortness of breath and wheezing.    Cardiovascular: Positive for chest pain. Negative for palpitations and leg swelling.   Gastrointestinal: Negative for abdominal pain, diarrhea, nausea and vomiting.   Genitourinary: Negative for dysuria and urgency.   Musculoskeletal: Negative for back pain and myalgias.   Skin: Negative for rash and wound.   Neurological: Negative for dizziness, light-headedness and headaches.         Objective      Vital Signs  Temp:  [97.4 °F (36.3 °C)-98.2 °F (36.8 °C)] 97.6 °F (36.4 °C)  Heart Rate:   [] 72  Resp:  [16-18] 18  BP: (113-142)/(71-84) 142/84  Body mass index is 30.98 kg/m².  No intake or output data in the 24 hours ending 22 1552    Physical Exam  Constitutional:       Appearance: Normal appearance. He is well-developed.   HENT:      Head: Normocephalic and atraumatic.   Cardiovascular:      Rate and Rhythm: Normal rate and regular rhythm.      Heart sounds: Normal heart sounds.   Pulmonary:      Effort: Pulmonary effort is normal.      Breath sounds: Normal breath sounds.   Abdominal:      General: Bowel sounds are normal.      Palpations: Abdomen is soft.   Skin:     General: Skin is warm and dry.   Neurological:      General: No focal deficit present.      Mental Status: He is alert and oriented to person, place, and time.   Psychiatric:         Mood and Affect: Mood normal.         Behavior: Behavior normal.           Results Review:   I reviewed the patient's new clinical results.  Results from last 7 days   Lab Units 22  1430   TROPONIN T ng/mL <0.010             No results found for: INR  No results found for: MG  Lab Results   Component Value Date    TSH 2.100 2022      No results found for: BNP    [id number]      Electronically signed by SHARATH Moreno, 22, 4:04 PM EDT.  Electronically signed by German Gonzalez MD, 22, 4:48 PM EDT.

## 2022-08-12 NOTE — NURSING NOTE
Dr. Gonzalez here to see patient per cardiology consult.  Patient resting quietly, denies chest pains.  Resting quietly.

## 2022-08-12 NOTE — DISCHARGE INSTR - APPOINTMENTS
Sep 22, 2022  2:30 PM   Initial Evaluation with Mera Combs LCSW   [facility] ([place]) [address] [address] [phone]   New: Please arrive 30 minutes prior to appointment and bring all medication, insurance cards, and ID.   This is the soonest available appointment. They office will place you on a call list if a sooner appointment becomes available.

## 2022-08-12 NOTE — H&P
INITIAL PSYCHIATRIC HISTORY & PHYSICAL    Patient Identification:  Name:  @  Age:   27 y.o.  Sex:   male  :   1995  MRN:   [id number]  Visit Number:   [id number]  Primary Care Physician:   Silvano Cazares MD    SUBJECTIVE    CC/Focus of Exam: Depression    HPI: [place] is a 27 y.o. male who was admitted on 2022 with complaints of depression with suicidal intent.    This is the first psychiatric and [facility] admission for this 27-year-old single childless high school educated (2 years of college dropping out for problems with math) nonreligious SSI recipient (since age 15 for the diagnosis of autism spectrum disorder)  male who lives alone in [place] near his  [place] (mother lives close by).The patient was referred from the [facility] ER where he had presented in the emergency room expressing suicidal intent.  Patient states he has experienced episodes of depression intermittently for the past several years worse recently perhaps 1 to 2 weeks prior to admission.  No history of manic episodes.  No substance abuse.  Patient has experienced a sense of hopelessness and worthlessness, loss of interest in usual interest and exacerbation of chronic insomnia, loss of appetite and the recent feeling that he be better off dead. Day of admission patient has been hitting himself and states when he started thinking about how he might kill himself  but sought help going to the emergency room.  Patient has never had any therapeutic contact with mental health professionals, neither outpatient nor inpatient.  No legal problems.  Patient explains the way he feels relates to the fact that he is lonely, socially isolated, has a bleak future.  He is also expressing a sense of guilt and shame over some online activity involving sexual issues that he prefers not to be specific about at this time.  He mentioned that he did talk to the therapist earlier about his behavior.  Patient admitted to the  "hospital at this time on a voluntary basis and participating in development of a treatment plan.    Available medical/psychiatric records reviewed and incorporated into the current document.     PAST PSYCHIATRIC HX: See present illness.    SUBSTANCE USE HX:    Does not seem to be an issue .  Urine drug screen done in the [facility] ER positive for THC.    FAMILY HX: Patient's father  when he was 9, father was chemically dependent and had a \"explosive nature\".  He apparently  of coronary artery disease.  No other psychiatric disorders nor suicides noted among his first-degree relatives.    SOCIAL HX: Patient was raised by his parents until his father's death in .  Seemed to have a close relationship with his mother who lives close to him in [facility].  No past legal problems.  No history of having been abused in his youth.  He has no 's license and has only worked briefly in service jobs, apparently has difficulties with his coordination, focus, interactions with others as well as a history of outbursts of anger.    Past Medical History:   Diagnosis Date   • Allergy to bean     Soybeans   • Asperger syndrome    • Chronic gastritis    • Cow's milk allergy    • Delayed gastric emptying    • Diarrhea    • Dysphagia     MOTHER REPORTS PATIENT HAS RECENTLY HAS GOTTEN CHOKED EASILY AND GETS FOOD STUCK IN THROAT.   • Egg allergy    • Epigastric abdominal pain    • Esophagitis    • GERD (gastroesophageal reflux disease)    • H/O seasonal allergies    • Hearing loss     Patient's mother reported more loss on the left side but that patient has loss bilateral.  No use of hearing devices.    • Obesity    • Self-injurious behavior    • Wears glasses    • Wheat allergy     Dental extractions, total, this past December.    Past Surgical History:   Procedure Laterality Date   • EAR TUBES     • ENDOSCOPY N/A 7/15/2017    Procedure: ESOPHAGOGASTRODUODENOSCOPY with bx , removal foreign body;  " Surgeon: Saroj Scott MD;  Location: [facility] OR;  Service:    • ENDOSCOPY N/A 8/24/2017    Procedure: ESOPHAGOGASTRODUODENOSCOPY ESOPHAGEAL DILATATION;  Surgeon: Saroj Scott MD;  Location: [facility] ENDOSCOPY;  Service:    • ENDOSCOPY N/A 4/9/2019    Procedure: ESOPHAGOGASTRODUODENOSCOPY FOR FOREIGN BODY, biopsy, and esophageal dilitation;  Surgeon: Tyrone Herzog MD;  Location: [facility] ENDOSCOPY;  Service: Gastroenterology   • ENDOSCOPY N/A 5/14/2019    Procedure: ESOPHAGOGASTRODUODENOSCOPY with biopsy;  Surgeon: Tyrone Herzog MD;  Location: [facility] ENDOSCOPY;  Service: Gastroenterology   • ENDOSCOPY N/A 3/5/2020    Procedure: ESOPHAGOGASTRODUODENOSCOPY;  Surgeon: Tyrone Herzog MD;  Location: [facility] ENDOSCOPY;  Service: Gastroenterology;  Laterality: N/A;   • ENDOSCOPY N/A 6/18/2020    Procedure: ESOPHAGOGASTRODUODENOSCOPY;  Surgeon: Tyrone Herzog MD;  Location: [facility] ENDOSCOPY;  Service: Gastroenterology;  Laterality: N/A;   • ENDOSCOPY N/A 5/11/2021    Procedure: ESOPHAGOGASTRODUODENOSCOPY WITH SAVORY DILITATION AND BIOPSY;  Surgeon: Nellie Xavier MD;  Location: [facility] ENDOSCOPY;  Service: Gastroenterology;  Laterality: N/A;   • ENDOSCOPY N/A 6/7/2021    Procedure: ESOPHAGOGASTRODUODENOSCOPY WITH SAVORY DILATATION AND BIOPSY ;  Surgeon: Nellie Xavier MD;  Location: [facility] ENDOSCOPY;  Service: Gastroenterology;  Laterality: N/A;   • ENDOSCOPY N/A 7/27/2021    Procedure: ESOPHAGOGASTRODUODENOSCOPY WITH DILATATION AND BIOPSY;  Surgeon: Nellie Xavier MD;  Location: [facility] ENDOSCOPY;  Service: Gastroenterology;  Laterality: N/A;   • ENDOSCOPY N/A 2/5/2022    Procedure: ESOPHAGOGASTRODUODENOSCOPY WITH FOREIGN BODY REMOVAL AND BIOPSY;  Surgeon: Levy Hester MD;  Location: [facility] OR;  Service: General;  Laterality: N/A;   • UPPER GASTROINTESTINAL ENDOSCOPY  04/09/2019   • UPPER GASTROINTESTINAL ENDOSCOPY  05/14/2019       Family History   Problem Relation Age of Onset   • No  Known Problems Mother    • No Known Problems Father    • Colon cancer Neg Hx          Medications Prior to Admission   Medication Sig Dispense Refill Last Dose   • fluticasone (FLOVENT HFA) 220 MCG/ACT inhaler Inhale 2 puffs 2 (Two) Times a Day.   8/11/2022 at Unknown time           ALLERGIES:  Patient has no known allergies.    Temp:  [97.4 °F (36.3 °C)-98.2 °F (36.8 °C)] 97.4 °F (36.3 °C)  Heart Rate:  [] 100  Resp:  [16-18] 16  BP: (131-138)/(82-84) 131/84    REVIEW OF SYSTEMS:  Review of Systems   Constitutional: Negative.    HENT: Positive for dental problem.    Eyes: Negative.    Respiratory: Negative.    Cardiovascular: Negative.    Gastrointestinal: Negative.         History of esophageal strictures and GERD.  Repeated EGDs, currently asymptomatic   Endocrine: Negative.    Genitourinary: Negative.    Musculoskeletal: Negative.    Skin: Negative.    Allergic/Immunologic: Negative.    Neurological: Negative.    Hematological: Negative.       See HPI for psychiatric ROS  OBJECTIVE    PHYSICAL EXAM:  Physical Exam  Vitals and nursing note reviewed. Exam conducted with a chaperone present.   Constitutional:       Appearance: Normal appearance. He is normal weight.   HENT:      Head: Normocephalic and atraumatic.      Comments: Edentulous     Right Ear: External ear normal.      Left Ear: External ear normal.      Nose: Nose normal.      Mouth/Throat:      Pharynx: Oropharynx is clear.   Eyes:      Extraocular Movements: Extraocular movements intact.      Conjunctiva/sclera: Conjunctivae normal.      Pupils: Pupils are equal, round, and reactive to light.   Cardiovascular:      Rate and Rhythm: Normal rate and regular rhythm.      Pulses: Normal pulses.   Pulmonary:      Effort: Pulmonary effort is normal.   Abdominal:      General: Abdomen is flat. Bowel sounds are normal.      Palpations: Abdomen is soft.   Musculoskeletal:         General: Normal range of motion.      Cervical back: Normal range of motion  and neck supple.   Skin:     General: Skin is warm and dry.   Neurological:      General: No focal deficit present.      Mental Status: He is alert and oriented to person, place, and time. Mental status is at baseline.         MENTAL STATUS EXAM:               Cooperation:  Cooperative  Eye Contact:  Downcast  Psychomotor Behavior:  Restless  Affect:  Depressed  Hopelessness: 10  Speech:  Slightly pressured  Linear  Thought Content:  Mood congruent  Suicidal:  Suicidal Ideation  Homicidal:  None  Hallucinations:  None  Delusion:  None  Memory:  Intact  Orientation:  Person, Place, Time and Situation  Reliability:  fair  Insight:  Fair  Judgement:  Fair  Impulse Control:  History of angry outbursts      Imaging Results (Last 24 Hours)     ** No results found for the last 24 hours. **           ECG/EMG Results (most recent)     Procedure Component Value Units Date/Time    ECG 12 Lead [[id number]] Collected: 08/12/22 0406     Updated: 08/12/22 0411     QT Interval 380 ms      QTC Interval 430 ms     Narrative:      Test Reason : Baseline Cardiac Status  Blood Pressure :   */*   mmHG  Vent. Rate :  77 BPM     Atrial Rate :  77 BPM     P-R Int : 152 ms          QRS Dur :  98 ms      QT Int : 380 ms       P-R-T Axes :  31  41  39 degrees     QTc Int : 430 ms    Normal sinus rhythm  Normal ECG  No previous ECGs available    Referred By:            Confirmed By:            Lab Results   Component Value Date    GLUCOSE 97 02/05/2022    BUN 13 02/05/2022    CREATININE 1.06 02/05/2022    EGFRIFNONA 83 02/05/2022    BCR 12.3 02/05/2022    CO2 25.9 02/05/2022    CALCIUM 9.8 02/05/2022    ALBUMIN 4.90 02/05/2022    AST 19 02/05/2022    ALT 22 02/05/2022       Lab Results   Component Value Date    WBC 6.11 02/05/2022    HGB 16.5 02/05/2022    HCT 48.4 02/05/2022    MCV 91.5 02/05/2022     02/05/2022       Pain Management Panel    There is no flowsheet data to display.         Brief Urine Lab Results     None           Reviewed labs and studies done with this admission.       ASSESSMENT & PLAN:        Major depression, recurrent (HCC), severe without psychosis    Will initiate antidepressant treatment with sertraline and involve patient in a supportive individual and group psychotherapeutic effort that includes CBT elements.    Autism spectrum disorder  Incorporated into the psychotherapeutic approach.    History of esophageal stricture and GERD  Protonix    The patient has been admitted for safety and stabilization.  Patient will be monitored for suicidality daily and maintained on Special Precautions Level 3 (q15 min checks) . The patient will have individual and group therapy with a master's level therapist. A master treatment plan will be developed and agreed upon by the patient and his/her treatment team.  The patient's estimated length of stay in the hospital is 5-7 days.       I spent a total of 70 minutes in direct patient care, greater than 40 minutes (greater than 50%) were spent face-to-face with assessment, coordination of care, counseling,  and answering any questions the patient had regarding his status and the treatment plan.     RIVER Styles MD    08/12/22  10:33 AM EDT

## 2022-08-12 NOTE — NURSING NOTE
"Patient direct from [facility] with complaints of SI with no plan, increased anger, and self injurious behavior.  Reports stressors as \"personal life stuff\".  Earlier in the evening the patient reports becoming angry at self and began hitting and scratching self.  Multiple superficial scratches to left forearm and right side of face.  Patient receives disability due to diagnosis of autism spectrum disorder.  Lives alone in an apartment with mother nearby.    "

## 2022-08-12 NOTE — NURSING NOTE
"Staff went to get Vs and pt c/o chest pain in Lt chest area which he states \"sometimes with acid reflux\".  Rated 3.  Dr. Styles notified.  "

## 2022-08-12 NOTE — PLAN OF CARE
Goal Outcome Evaluation:  Plan of Care Reviewed With: patient  Patient Agreement with Plan of Care: agrees     Progress: no change  Outcome Evaluation: Patient has been very cooperative today.  Had episode of chest pain and had cardiology consult.  Has been resting since episode of chest pain.  Denies S/I.

## 2022-08-13 LAB
BH CV ECHO MEAS - ACS: 2 CM
BH CV ECHO MEAS - AO MAX PG: 6.1 MMHG
BH CV ECHO MEAS - AO MEAN PG: 3 MMHG
BH CV ECHO MEAS - AO ROOT DIAM: 3.2 CM
BH CV ECHO MEAS - AO V2 MAX: 123 CM/SEC
BH CV ECHO MEAS - AO V2 VTI: 24.3 CM
BH CV ECHO MEAS - EDV(CUBED): 97.3 ML
BH CV ECHO MEAS - EDV(MOD-SP4): 55.2 ML
BH CV ECHO MEAS - EF(MOD-BP): 66 %
BH CV ECHO MEAS - EF(MOD-SP4): 67.8 %
BH CV ECHO MEAS - ESV(CUBED): 24.4 ML
BH CV ECHO MEAS - ESV(MOD-SP4): 17.8 ML
BH CV ECHO MEAS - FS: 37 %
BH CV ECHO MEAS - IVS/LVPW: 0.75 CM
BH CV ECHO MEAS - IVSD: 0.9 CM
BH CV ECHO MEAS - LA DIMENSION: 2.7 CM
BH CV ECHO MEAS - LAT PEAK E' VEL: 11 CM/SEC
BH CV ECHO MEAS - LV DIASTOLIC VOL/BSA (35-75): 24.5 CM2
BH CV ECHO MEAS - LV MASS(C)D: 169.9 GRAMS
BH CV ECHO MEAS - LV SYSTOLIC VOL/BSA (12-30): 7.9 CM2
BH CV ECHO MEAS - LVIDD: 4.6 CM
BH CV ECHO MEAS - LVIDS: 2.9 CM
BH CV ECHO MEAS - LVOT AREA: 3.5 CM2
BH CV ECHO MEAS - LVOT DIAM: 2.1 CM
BH CV ECHO MEAS - LVPWD: 1.2 CM
BH CV ECHO MEAS - MED PEAK E' VEL: 7 CM/SEC
BH CV ECHO MEAS - MV A MAX VEL: 75.3 CM/SEC
BH CV ECHO MEAS - MV E MAX VEL: 61.8 CM/SEC
BH CV ECHO MEAS - MV E/A: 0.82
BH CV ECHO MEAS - PA ACC TIME: 0.09 SEC
BH CV ECHO MEAS - PA PR(ACCEL): 40.8 MMHG
BH CV ECHO MEAS - SI(MOD-SP4): 16.6 ML/M2
BH CV ECHO MEAS - SV(MOD-SP4): 37.4 ML
BH CV ECHO MEAS - TAPSE (>1.6): 1.87 CM
BH CV ECHO MEASUREMENTS AVERAGE E/E' RATIO: 6.87
CHOLEST SERPL-MCNC: 184 MG/DL (ref 0–200)
HDLC SERPL-MCNC: 22 MG/DL (ref 40–60)
LDLC SERPL CALC-MCNC: 139 MG/DL (ref 0–100)
LDLC/HDLC SERPL: 6.21 {RATIO}
LEFT ATRIUM VOLUME INDEX: 13 ML/M2
MAXIMAL PREDICTED HEART RATE: 191 BPM
STRESS TARGET HR: 162 BPM
TRIGL SERPL-MCNC: 127 MG/DL (ref 0–150)
VLDLC SERPL-MCNC: 23 MG/DL (ref 5–40)

## 2022-08-13 PROCEDURE — 94799 UNLISTED PULMONARY SVC/PX: CPT

## 2022-08-13 PROCEDURE — 80061 LIPID PANEL: CPT | Performed by: PSYCHIATRY & NEUROLOGY

## 2022-08-13 PROCEDURE — 94761 N-INVAS EAR/PLS OXIMETRY MLT: CPT

## 2022-08-13 PROCEDURE — 99232 SBSQ HOSP IP/OBS MODERATE 35: CPT | Performed by: PSYCHIATRY & NEUROLOGY

## 2022-08-13 RX ADMIN — SERTRALINE 50 MG: 50 TABLET, FILM COATED ORAL at 08:18

## 2022-08-13 RX ADMIN — HYDROXYZINE HYDROCHLORIDE 50 MG: 50 TABLET ORAL at 08:18

## 2022-08-13 RX ADMIN — PANTOPRAZOLE SODIUM 40 MG: 40 TABLET, DELAYED RELEASE ORAL at 06:05

## 2022-08-13 RX ADMIN — TRAZODONE HYDROCHLORIDE 50 MG: 50 TABLET ORAL at 22:40

## 2022-08-13 NOTE — PROGRESS NOTES
" Inpatient Psych Progress Note     Clinician: Sumeet Styles MD  Admission Date: 8/12/2022  10:43 EDT 08/13/22    Behavioral Health Treatment Plan and Problem List: I have reviewed and approved the Behavioral Health Treatment Plan and Problem list.    Allergies  No Known Allergies    Hospital Day: 1 day      Assessment completed within view of staff    History  CC/clinical focus: depression    Interval HPI: Patient seen and evaluated by me.  Chart reviewed. 27 y.o. male who was admitted on 8/12/2022 with complaints of depression with suicidal intent.  Patient continues to experience significant depressive symptomatology along with some SI.   He does feels safe here in the hospital.  Med Compliant.  ROS otherwise as below.      Interval Review of Systems:   General ROS: negative for - fever or malaise  Endocrine ROS: negative for - palpitations  Respiratory ROS: no cough, shortness of breath, or wheezing  Cardiovascular ROS: no chest pain or dyspnea on exertion  Gastrointestinal ROS: no abdominal pain,no black or bloody stools    /87 (BP Location: Right arm, Patient Position: Sitting)   Pulse 94   Temp 97.8 °F (36.6 °C) (Temporal)   Resp 18   Ht 182.9 cm (72\")   Wt 104 kg (228 lb 6.4 oz)   SpO2 98%   BMI 30.98 kg/m²     Mental Status Exam  Mood: depressed  Affect: mood-congruent   Thought Processes: linear  Thought Content: negativistic  Hallucinations: no  Suicidal Thoughts: denies  Suicidal Plan/Intent: denies  Hopelesness:Moderate  Homicidal Thoughts:  denies      Medical Decision Making:   Labs:     Lab Results (last 24 hours)     Procedure Component Value Units Date/Time    Lipid Panel [[id number]]  (Abnormal) Collected: 08/13/22 0519    Specimen: Blood Updated: 08/13/22 0623     Total Cholesterol 184 mg/dL      Triglycerides 127 mg/dL      HDL Cholesterol 22 mg/dL      LDL Cholesterol  139 mg/dL      VLDL Cholesterol 23 mg/dL      LDL/HDL Ratio 6.21    Narrative:      Cholesterol Reference " Ranges  (U.S. Department of Health and Human Services ATP III Classifications)    Desirable          <200 mg/dL  Borderline High    200-239 mg/dL  High Risk          >240 mg/dL      Triglyceride Reference Ranges  (U.S. Department of Health and Human Services ATP III Classifications)    Normal           <150 mg/dL  Borderline High  150-199 mg/dL  High             200-499 mg/dL  Very High        >500 mg/dL    HDL Reference Ranges  (U.S. Department of Health and Human Services ATP III Classifications)    Low     <40 mg/dl (major risk factor for CHD)  High    >60 mg/dl ('negative' risk factor for CHD)        LDL Reference Ranges  (U.S. Department of Health and Human Services ATP III Classifications)    Optimal          <100 mg/dL  Near Optimal     100-129 mg/dL  Borderline High  130-159 mg/dL  High             160-189 mg/dL  Very High        >189 mg/dL    Troponin [[id number]]  (Normal) Collected: 08/12/22 1430    Specimen: Blood Updated: 08/12/22 1510     Troponin T <0.010 ng/mL     Narrative:      Troponin T Reference Range:  <= 0.03 ng/mL-   Negative for AMI  >0.03 ng/mL-     Abnormal for myocardial necrosis.  Clinicians would have to utilize clinical acumen, EKG, Troponin and serial changes to determine if it is an Acute Myocardial Infarction or myocardial injury due to an underlying chronic condition.       Results may be falsely decreased if patient taking Biotin.      T4, Free [[id number]]  (Normal) Collected: 08/12/22 1430    Specimen: Blood Updated: 08/12/22 1510     Free T4 1.49 ng/dL     Narrative:      Results may be falsely increased if patient taking Biotin.      TSH [[id number]]  (Normal) Collected: 08/12/22 1430    Specimen: Blood Updated: 08/12/22 1510     TSH 2.100 uIU/mL     Hemoglobin A1c [[id number]]  (Normal) Collected: 08/12/22 1430    Specimen: Blood Updated: 08/12/22 1455     Hemoglobin A1C 5.20 %     Narrative:      Hemoglobin A1C Ranges:    Increased Risk for Diabetes  5.7% to 6.4%  Diabetes                      >= 6.5%  Diabetic Goal                < 7.0%            Radiology:     Imaging Results (Last 24 Hours)     ** No results found for the last 24 hours. **            EKG:     ECG/EMG Results (most recent)     Procedure Component Value Units Date/Time    ECG 12 Lead [[id number]] Collected: 08/12/22 1430     Updated: 08/12/22 1434     QT Interval 384 ms      QTC Interval 411 ms     Narrative:      Test Reason : chest pain  Blood Pressure :   */*   mmHG  Vent. Rate :  69 BPM     Atrial Rate :  69 BPM     P-R Int : 172 ms          QRS Dur : 102 ms      QT Int : 384 ms       P-R-T Axes :  18  43  39 degrees     QTc Int : 411 ms    Normal sinus rhythm  Cannot rule out Inferior infarct , age undetermined  Abnormal ECG  When compared with ECG of 12-AUG-2022 04:06, (Unconfirmed)  No significant change was found    Referred By: DANNA           Confirmed By:     ECG 12 Lead [[id number]] Collected: 08/12/22 0406     Updated: 08/12/22 1501     QT Interval 380 ms      QTC Interval 430 ms     Narrative:      Test Reason : Baseline Cardiac Status  Blood Pressure :   */*   mmHG  Vent. Rate :  77 BPM     Atrial Rate :  77 BPM     P-R Int : 152 ms          QRS Dur :  98 ms      QT Int : 380 ms       P-R-T Axes :  31  41  39 degrees     QTc Int : 430 ms    Normal sinus rhythm  Normal ECG  No previous ECGs available  Confirmed by Ceasar Lorenz ([id number]) on 8/12/2022 2:58:32 PM    Referred By:            Confirmed By: Ceasar Lorenz    Adult Transthoracic Echo Complete W/ Cont if Necessary Per Protocol [[id number]] Resulted: 08/13/22 0956     Updated: 08/13/22 0956     Target HR (85%) 162 bpm      Max. Pred. HR (100%) 191 bpm      LA ESV Index (BP) 13.0 ml/m2      Avg E/e' ratio 6.87     ACS 2.00 cm      Ao root diam 3.2 cm      Ao pk melvin 123.0 cm/sec      Ao V2 VTI 24.3 cm      EDV(cubed) 97.3 ml      EDV(MOD-sp4) 55.2 ml      EF(MOD-sp4) 67.8 %      ESV(cubed) 24.4 ml      ESV(MOD-sp4) 17.8 ml      IVS/LVPW 0.75 cm       Lat Peak E' Edd 11.0 cm/sec      LV mass(C)d 169.9 grams      LVPWd 1.20 cm      Med Peak E' Edd 7.0 cm/sec      PA acc time 0.09 sec      PA pr(Accel) 40.8 mmHg      SI(MOD-sp4) 16.6 ml/m2      SV(MOD-sp4) 37.4 ml      Ao max PG 6.1 mmHg      Ao mean PG 3.0 mmHg      FS 37.0 %      IVSd 0.90 cm      LA dimension (2D)  2.7 cm      LVIDd 4.6 cm      LVIDs 2.9 cm      LVOT area 3.5 cm2      LVOT diam 2.10 cm      MV E/A 0.82     MV A max edd 75.3 cm/sec      MV E max edd 61.8 cm/sec      LV Meclhor Vol (BSA corrected) 24.5 cm2      LV Sys Vol (BSA corrected) 7.9 cm2      TAPSE (>1.6) 1.87 cm            Medications:  budesonide, 0.5 mg, Nebulization, BID - RT  pantoprazole, 40 mg, Oral, Q AM  sertraline, 50 mg, Oral, Daily           All medications reviewed.      Assessment and Plan:     Major depression, recurrent (HCC), severe without psychosis  Continue Zoloft 50mg Daily     Autism spectrum disorder  Incorporated into the psychotherapeutic approach.     History of esophageal stricture and GERD  Protonix      Continue hospitalization for safety and stabilization.  Continue current level of Special Precautions (q15 minute checks).

## 2022-08-13 NOTE — PLAN OF CARE
Goal Outcome Evaluation:  Plan of Care Reviewed With: patient  Patient Agreement with Plan of Care: agrees     Progress: no change  Outcome Evaluation: CLIENT DENIED CHEST PAIN DURING AM NURSING ASSESSMENT, NO FURTHER COMPLAINT OF CHEST PAIN SINCE YESTERDAY. ECHO COMPLETED TODAY PER CARDIOLOGY CONSULT. RATED ANXIETY 8 AND DEPRESSION 7, BOTH ON A SCALE OF 0-10.

## 2022-08-13 NOTE — PLAN OF CARE
Goal Outcome Evaluation:  Plan of Care Reviewed With: patient  Patient Agreement with Plan of Care: agrees        Pt states anxiety 8, depression 8. He states he has trouble falling and staying asleep. He also states he has trouble eating due to low appetite and nausea. Pt agrees to ask staff for nausea medications prn. Pt is calm and cooperative with staff, med compliant.

## 2022-08-14 PROCEDURE — 99232 SBSQ HOSP IP/OBS MODERATE 35: CPT | Performed by: INTERNAL MEDICINE

## 2022-08-14 PROCEDURE — 99232 SBSQ HOSP IP/OBS MODERATE 35: CPT | Performed by: PSYCHIATRY & NEUROLOGY

## 2022-08-14 PROCEDURE — 86677 HELICOBACTER PYLORI ANTIBODY: CPT | Performed by: NURSE PRACTITIONER

## 2022-08-14 PROCEDURE — 94799 UNLISTED PULMONARY SVC/PX: CPT

## 2022-08-14 RX ORDER — METOPROLOL SUCCINATE 25 MG/1
25 TABLET, EXTENDED RELEASE ORAL
Status: DISCONTINUED | OUTPATIENT
Start: 2022-08-14 | End: 2022-08-16 | Stop reason: HOSPADM

## 2022-08-14 RX ADMIN — PANTOPRAZOLE SODIUM 40 MG: 40 TABLET, DELAYED RELEASE ORAL at 05:49

## 2022-08-14 RX ADMIN — HYDROXYZINE HYDROCHLORIDE 50 MG: 50 TABLET ORAL at 08:27

## 2022-08-14 RX ADMIN — TRAZODONE HYDROCHLORIDE 50 MG: 50 TABLET ORAL at 20:16

## 2022-08-14 RX ADMIN — SERTRALINE 50 MG: 50 TABLET, FILM COATED ORAL at 08:27

## 2022-08-14 RX ADMIN — METOPROLOL SUCCINATE 25 MG: 25 TABLET, EXTENDED RELEASE ORAL at 12:29

## 2022-08-14 NOTE — PROGRESS NOTES
" Inpatient Psych Progress Note     Clinician: Sumeet Styles MD  Admission Date: 8/12/2022  09:50 EDT 08/14/22    Behavioral Health Treatment Plan and Problem List: I have reviewed and approved the Behavioral Health Treatment Plan and Problem list.    Allergies  No Known Allergies    Hospital Day: 2 days      Assessment completed within view of staff    History  CC/clinical focus: depression    Interval HPI: Patient seen and evaluated by me.  Chart reviewed.   Patient rates  level of depression (subjectively) at a   7/10.  Anxiety   7/10.  Patient tolerating meds okay.  Denies side effects.  Med Compliant.  ROS otherwise as below.      Interval Review of Systems:   General ROS: negative for - fever or malaise  Endocrine ROS: negative for - palpitations  Respiratory ROS: no cough, shortness of breath, or wheezing  Cardiovascular ROS: no chest pain or dyspnea on exertion  Gastrointestinal ROS: no abdominal pain,no black or bloody stools    /85 (BP Location: Right arm, Patient Position: Sitting)   Pulse 102   Temp 97.7 °F (36.5 °C) (Temporal)   Resp 18   Ht 182.9 cm (72\")   Wt 104 kg (228 lb 6.4 oz)   SpO2 97%   BMI 30.98 kg/m²     Mental Status Exam  Mood: depressed  Affect: mood-congruent   Thought Processes: linear  Thought Content: normal  Hallucinations: no  Suicidal Thoughts: denies  Suicidal Plan/Intent: denies  Hopelesness:Moderate  Homicidal Thoughts:  denies      Medical Decision Making:   Labs:     Lab Results (last 24 hours)     ** No results found for the last 24 hours. **            Radiology:     Imaging Results (Last 24 Hours)     ** No results found for the last 24 hours. **            EKG:     ECG/EMG Results (most recent)     Procedure Component Value Units Date/Time    ECG 12 Lead [[id number]] Collected: 08/12/22 1430     Updated: 08/12/22 1434     QT Interval 384 ms      QTC Interval 411 ms     Narrative:      Test Reason : chest pain  Blood Pressure :   */*   mmHG  Vent. Rate :  69 BPM "     Atrial Rate :  69 BPM     P-R Int : 172 ms          QRS Dur : 102 ms      QT Int : 384 ms       P-R-T Axes :  18  43  39 degrees     QTc Int : 411 ms    Normal sinus rhythm  Cannot rule out Inferior infarct , age undetermined  Abnormal ECG  When compared with ECG of 12-AUG-2022 04:06, (Unconfirmed)  No significant change was found    Referred By: ADNNA           Confirmed By:     ECG 12 Lead [[id number]] Collected: 08/12/22 0406     Updated: 08/12/22 1501     QT Interval 380 ms      QTC Interval 430 ms     Narrative:      Test Reason : Baseline Cardiac Status  Blood Pressure :   */*   mmHG  Vent. Rate :  77 BPM     Atrial Rate :  77 BPM     P-R Int : 152 ms          QRS Dur :  98 ms      QT Int : 380 ms       P-R-T Axes :  31  41  39 degrees     QTc Int : 430 ms    Normal sinus rhythm  Normal ECG  No previous ECGs available  Confirmed by Ceasar Lorenz (2020) on 8/12/2022 2:58:32 PM    Referred By:            Confirmed By: Ceasar Lorenz    Adult Transthoracic Echo Complete W/ Cont if Necessary Per Protocol [[id number]] Resulted: 08/13/22 1222     Updated: 08/13/22 1227     Target HR (85%) 162 bpm      Max. Pred. HR (100%) 191 bpm      LA ESV Index (BP) 13.0 ml/m2      Avg E/e' ratio 6.87     ACS 2.00 cm      Ao root diam 3.2 cm      Ao pk edd 123.0 cm/sec      Ao V2 VTI 24.3 cm      EDV(cubed) 97.3 ml      EDV(MOD-sp4) 55.2 ml      EF(MOD-sp4) 67.8 %      ESV(cubed) 24.4 ml      ESV(MOD-sp4) 17.8 ml      IVS/LVPW 0.75 cm      Lat Peak E' Edd 11.0 cm/sec      LV mass(C)d 169.9 grams      LVPWd 1.20 cm      Med Peak E' Edd 7.0 cm/sec      PA acc time 0.09 sec      PA pr(Accel) 40.8 mmHg      SI(MOD-sp4) 16.6 ml/m2      SV(MOD-sp4) 37.4 ml      Ao max PG 6.1 mmHg      Ao mean PG 3.0 mmHg      FS 37.0 %      IVSd 0.90 cm      LA dimension (2D)  2.7 cm      LVIDd 4.6 cm      LVIDs 2.9 cm      LVOT area 3.5 cm2      LVOT diam 2.10 cm      MV E/A 0.82     MV A max edd 75.3 cm/sec      MV E max edd 61.8 cm/sec       LV Melchor Vol (BSA corrected) 24.5 cm2      LV Sys Vol (BSA corrected) 7.9 cm2      TAPSE (>1.6) 1.87 cm      EF(MOD-bp) 66 %     Narrative:      · Left ventricular systolic function is normal.  · Left ventricular diastolic dysfunction is noted.              Medications:  budesonide, 0.5 mg, Nebulization, BID - RT  pantoprazole, 40 mg, Oral, Q AM  sertraline, 50 mg, Oral, Daily           All medications reviewed.      Assessment and Plan:      Major depression, recurrent (HCC), severe without psychosis  Continue Zoloft 50mg Daily     Autism spectrum disorder  Incorporated into the psychotherapeutic approach.     History of esophageal stricture and GERD  Protonix       Continue hospitalization for safety and stabilization.  Continue current level of Special Precautions (q15 minute checks).

## 2022-08-14 NOTE — PLAN OF CARE
Goal Outcome Evaluation:  Plan of Care Reviewed With: patient  Patient Agreement with Plan of Care: agrees        Pt states anxiety 8, depression 7. He is calm and cooperative with staff, med compliant.

## 2022-08-14 NOTE — PROGRESS NOTES
LOS: 2 days     Name: Crow Rock  Age/Sex: 27 y.o. male  :  1995        PCP: Silvano Caazres MD    Principal Problem:    Severe episode of recurrent major depressive disorder, without psychotic features ([facility])  Active Problems:    Gastroesophageal reflux disease with esophagitis    Autism spectrum disorder      Admission Information: Crow Rock is a 27 y.o. male with a past medical history significant for GERD. He [facility] to [facility] on 22 due to depression. Cardiology was consulted for an episode of chest pain and abnormal EKG. The episode of chest discomfort occurred after eating lunch. He reports the chest tightness lasted for several minutes. He reports this was associated with reflux and heart burn. He has a history of GERD and is prescribed Protonix at home. After this episode today Maalox and Pepcid were administered. The patient's symptoms resolved. He denies any syncope, palpitations, leg edema, or shortness of breath. He denies any known cardiac history. He does report his dad  of an MI when he was in his 50's. The patient reports he has had multiple EGD's in the past with the most recent in February of this year. Troponin was negative. EKG did not reveal any acute ischemic changes but did reveal Q waves in the inferior leads with no EKG prior to admission for comparison.    Following for: abnormal EKG and chest pain     Telemetry Monitoring: none     Interval history: Patient has completed echocardiogram. Resting comfortably on the couch watching TV prior to exam. He denies any further episodes of chest discomfort, overall feeling well. Elevated HR this am, BP has been stable.     Subjective     Review of Systems   Constitutional: Negative for chills, fever, weight gain and weight loss.   HENT: Negative for congestion, hoarse voice and sore throat.    Eyes: Negative for blurred vision, pain and visual disturbance.   Cardiovascular: Negative for chest pain,  claudication, cyanosis, dyspnea on exertion, irregular heartbeat, leg swelling, near-syncope, orthopnea, palpitations and syncope.   Respiratory: Negative for cough, shortness of breath and wheezing.    Endocrine: Negative for cold intolerance, heat intolerance and polyuria.   Hematologic/Lymphatic: Negative for bleeding problem. Does not bruise/bleed easily.   Skin: Negative for color change, flushing and rash.   Musculoskeletal: Negative for arthritis, back pain, joint pain and myalgias.   Gastrointestinal: Negative for abdominal pain, constipation, diarrhea, nausea and vomiting.   Genitourinary: Negative for dysuria, frequency, hesitancy and urgency.   Neurological: Negative for excessive daytime sleepiness, dizziness, headaches, numbness, vertigo and weakness.   Psychiatric/Behavioral: Negative for depression. The patient does not have insomnia and is not nervous/anxious.    All other systems reviewed and are negative.      Vital Signs  Vitals:    08/14/22 0751 08/14/22 0800 08/14/22 0810 08/14/22 0900   BP:  124/85     BP Location:  Right arm     Patient Position:  Sitting     Pulse: 99 (!) 127 102 80   Resp:  18     Temp:  97.7 °F (36.5 °C)     TempSrc:  Temporal     SpO2: 96% 97%     Weight:       Height:         Body mass index is 30.98 kg/m².    No intake or output data in the 24 hours ending 08/14/22 1058    Vitals reviewed.   Constitutional:       Appearance: Normal appearance. Well-developed.   HENT:      Head: Normocephalic.   Pulmonary:      Effort: Pulmonary effort is normal.      Breath sounds: Normal breath sounds.   Cardiovascular:      Normal rate. Regular rhythm.   Edema:     Peripheral edema absent.   Abdominal:      General: Bowel sounds are normal.      Palpations: Abdomen is soft. There is no hepatomegaly or splenomegaly.      Tenderness: There is no abdominal tenderness.   Musculoskeletal: Normal range of motion. Skin:     General: Skin is warm and dry.   Neurological:      Mental Status:  Alert and oriented to person, place, and time.   Psychiatric:         Behavior: Behavior is cooperative.                Results Review:             Results from last 7 days   Lab Units 08/12/22  1430   TROPONIN T ng/mL <0.010       ECHOCARDIOGRAM:       Admission Date/Time Discharge Date/Time Room/Bed   08/12/22  03:36 AM  1020/2S      [facility] PACS Images     Show images for Adult Transthoracic Echo Complete W/ Cont if Necessary Per Protocol     Sedation Narrator Report    Sedation Narrator Report     Interpretation Summary    · Left ventricular systolic function is normal.  · Left ventricular diastolic dysfunction is noted.               I reviewed the patient's new clinical results.  I reviewed the patient's new imaging results and agree with the interpretation.  I personally viewed and interpreted the patient's EKG/Telemetry data      Medication Review:   budesonide, 0.5 mg, Nebulization, BID - RT  pantoprazole, 40 mg, Oral, Q AM  sertraline, 50 mg, Oral, Daily           Assessment:  1. Atypical chest pain  2. Gastroesophageal reflux disease  3. Abnormal EKG      Recommendations:  1. Cardiac.  Patient with normal EF on echo.  Cardiac enzymes were negative.  Patient has had normal discomfort in the chest.  Patient has history of GI issues in the past and had scope done earlier this year.  We will advised to continue Protonix for now.  Will check H. pylori antibody.  Aggressive risk factor modification coronary artery disease to continue    I discussed the patients findings and my recommendations with patient and family      SHARATH Hooper  08/14/22  09:13 EDT    Electronically signed by SHARATH Hooper, 08/14/22, 10:59 AM EDT.  Electronically signed by German Gonzalez MD, 08/14/22, 11:25 AM EDT.

## 2022-08-14 NOTE — PLAN OF CARE
Problem: Adult Behavioral Health Plan of Care  Goal: Plan of Care Review  Outcome: Ongoing, Progressing  Flowsheets  Taken 8/14/2022 1234  Progress: improving  Taken 8/14/2022 0805  Plan of Care Reviewed With: patient  Patient Agreement with Plan of Care: agrees   Goal Outcome Evaluation:  Plan of Care Reviewed With: patient  Patient Agreement with Plan of Care: agrees     Progress: improving

## 2022-08-15 ENCOUNTER — APPOINTMENT (OUTPATIENT)
Dept: GENERAL RADIOLOGY | Facility: HOSPITAL | Age: 27
End: 2022-08-15

## 2022-08-15 PROCEDURE — 94799 UNLISTED PULMONARY SVC/PX: CPT

## 2022-08-15 PROCEDURE — 71046 X-RAY EXAM CHEST 2 VIEWS: CPT

## 2022-08-15 PROCEDURE — 99232 SBSQ HOSP IP/OBS MODERATE 35: CPT | Performed by: PSYCHIATRY & NEUROLOGY

## 2022-08-15 PROCEDURE — 71046 X-RAY EXAM CHEST 2 VIEWS: CPT | Performed by: RADIOLOGY

## 2022-08-15 RX ADMIN — SERTRALINE 50 MG: 50 TABLET, FILM COATED ORAL at 08:39

## 2022-08-15 RX ADMIN — METOPROLOL SUCCINATE 25 MG: 25 TABLET, EXTENDED RELEASE ORAL at 08:39

## 2022-08-15 RX ADMIN — TRAZODONE HYDROCHLORIDE 50 MG: 50 TABLET ORAL at 21:22

## 2022-08-15 RX ADMIN — PANTOPRAZOLE SODIUM 40 MG: 40 TABLET, DELAYED RELEASE ORAL at 05:25

## 2022-08-15 NOTE — PAYOR COMM NOTE
"Crow Talley (27 y.o. Male)             Date of Birth   1995    Social Security Number       Address   [address] [facility] [address]    Home Phone   [phone]    MRN   [id number]       Bahai   Faith    Marital Status   Single                            Admission Date   8/12/22    Admission Type   Urgent    Admitting Provider   Sumeet Styles MD    Attending Provider   Sumeet Styles MD    Department, Room/Bed   [facility] ADULT PSYCHIATRIC, 1020/2S       Discharge Date       Discharge Disposition       Discharge Destination                               Attending Provider: Sumeet Styles MD    Allergies: No Known Allergies    Isolation: None   Infection: None   Code Status: CPR   Advance Care Planning Activity    Ht: 182.9 cm (72\")   Wt: 104 kg (228 lb 6.4 oz)    Admission Cmt: None   Principal Problem: Severe episode of recurrent major depressive disorder, without psychotic features (HCC) [F33.2]                 Active Insurance as of 8/12/2022     Primary Coverage     Payor Plan Insurance Group Employer/Plan Group    ANTHEM MEDICARE REPLACEMENT ANTHEM MEDICARE ADVANTAGE [id number]     Payor Plan Address Payor Plan Phone Number Payor Plan Fax Number Effective Dates    [address] [phone]  3/1/2020 - None Entered    [facility] [address]       Subscriber Name Subscriber Birth Date Member ID       CROW TALLEY 1995 [id number]           Secondary Coverage     Payor Plan Insurance Group Employer/Plan Group    [place]      Payor Plan Address Payor Plan Phone Number Payor Plan Fax Number Effective Dates    [address] [phone]  12/10/2019 - None Entered    [facility] [address]       Subscriber Name Subscriber Birth Date Member ID       CROW TALLEY 1995 [id number]                 Emergency Contacts      (Rel.) Home Phone Work Phone Mobile Phone    Riki Talley (Mother) [phone] -- -- "    PALOMA TALLEY (Sister) [phone] -- --        RE: Crow Talley  : 1995  Please see above for additional demographics  Admission date: 22 with last covered day 22  Requesting one additional day for continued mood stabilization and discharge planning     Facility: [facility]  NPI: [id number]    Attending MD: Abilio Styles   NPI: [id number]    UR Contact: Ibis KESSLER  Phone: [phone] (direct) or [phone] (confidential VM only)  UR Fax: [phone]    Please attach this this clinical information to authorization [id number]    Diagnosis: F33.2 - Severe episode of recurrent major depressive disorder, without psychotic features  F84.0 - Autism spectrum disorder    Current Medications:  sertraline (ZOLOFT) tablet 50 mg  Dose: 50 mg  Freq: Daily Route: PO  Start: 22 1200  pantoprazole (PROTONIX) EC tablet 40 mg  Dose: 40 mg  Freq: Every Early Morning Route: PO  Start: 22 1130  metoprolol succinate XL (TOPROL-XL) 24 hr tablet 25 mg  Dose: 25 mg  Freq: Every 24 Hours Scheduled Route: PO  Start: 22 1200  budesonide (PULMICORT) nebulizer solution 0.5 mg  Dose: 0.5 mg  Freq: 2 Times Daily - RT Route: NEBULIZATION  Start: 22 0800    PRN Medications:  hydrOXYzine (ATARAX) tablet 50 mg  Dose: 50 mg  Freq: Every 6 Hours PRN Route: PO  PRN Reason: Anxiety  Start: 22  Last given at 08 on 22  traZODone (DESYREL) tablet 50 mg  Dose: 50 mg  Freq: Nightly PRN Route: PO  PRN Reason: Sleep  Start: 22  Last given at  on 22      MD note:    Abilio Styles MD    Physician   Psychiatry   Progress Notes      Signed   Date of Service:  08/15/22 0955   Creation Time:  08/15/22 0955              Signed              Show:Clear all  [x]Manual[x]Template[x]Copied    Added by:  [x]Abilio Styles MD      []Jeovany for details    INPATIENT PSYCHIATRIC PROGRESS NOTE     Name:  Crow Talley  :  1995  MRN:   "[id number]  Visit Number:  [id number]  Length of stay:  3     Behavioral Health Treatment Plan and Problem List: I have reviewed and approved the Behavioral Health Treatment Plan and Problem list.     SUBJECTIVE     CC/ Focus of exam: Depression     Patient's subjective status: \"Some better\"     INTERVAL HISTORY: The patient's affect is improved, no longer feeling hopeless nor experiencing any thoughts of self-harm.  Patient relates his sense of guilt and shame over perhaps a preoccupation with pornography (heterrsexual, adult) that might border on addiction.  Patient somewhat optimistic about addressing this with a psychotherapeutic effort post hospital.  We will be anticipating discharge tomorrow after establishing follow up treatment plan.  Patient slept well, appetite improving, socialization on the unit improving.  Patient notes his strong family support lives nearby his home apartment.  Described  Depression rating 5-6/10  Anxiety rating 5/10  Sleep: 8 to 9 hours  Craving: Denies.     ROS: No cardiovascular or Neurological complaints.   Appreciate cardiology consult, has not experienced any subsequent chest pain following the incident August 12.  Will obtain a chest x-ray today.     OBJECTIVE         Vitals:     08/15/22 0839   BP: 132/93   Pulse: 100   Resp:     Temp:     SpO2:        Temp:  [97 °F (36.1 °C)-97.6 °F (36.4 °C)] 97 °F (36.1 °C)  Heart Rate:  [] 100  Resp:  [18] 18  BP: (118-146)/(86-97) 132/93     MENTAL STATUS EXAM:            Psychomotor: No psychomotor agitation/retardation, No EPS, No motor tics  Speech-normal rate, amount.  Mood/Affect:  Depressed  Thought Processes:  coherent  Thought Content:  mood congruent  Hallucination(s): none  Hopelessness: Yes  Optimistic:Yes  Suicidal Thoughts:   No  Suicidal Plan/Intent:  No  Homicidal Thoughts:  absent  Orientation: oriented x 3  Memory: Immediate, recent, recent remote, remote intact              Lab Results (last 24 hours)      " Procedure Component Value Units Date/Time     Helicobacter Pylori, IgA IgG IgM [[id number]] Collected: 08/14/22 1451     Specimen: Blood Updated: 08/14/22 1541                            Imaging Results (Last 24 Hours)      ** No results found for the last 24 hours. **             Lab and x-ray studies reviewed.              ECG/EMG Results (most recent)      Procedure Component Value Units Date/Time     ECG 12 Lead [[id number]] Collected: 08/12/22 1430       Updated: 08/12/22 1434       QT Interval 384 ms         QTC Interval 411 ms       Narrative:       Test Reason : chest pain  Blood Pressure :   */*   mmHG  Vent. Rate :  69 BPM     Atrial Rate :  69 BPM     P-R Int : 172 ms          QRS Dur : 102 ms      QT Int : 384 ms       P-R-T Axes :  18  43  39 degrees     QTc Int : 411 ms     Normal sinus rhythm  Cannot rule out Inferior infarct , age undetermined  Abnormal ECG  When compared with ECG of 12-AUG-2022 04:06, (Unconfirmed)  No significant change was found     Referred By: DANNA           Confirmed By:      ECG 12 Lead [[id number]] Collected: 08/12/22 0406       Updated: 08/12/22 1501       QT Interval 380 ms         QTC Interval 430 ms       Narrative:       Test Reason : Baseline Cardiac Status  Blood Pressure :   */*   mmHG  Vent. Rate :  77 BPM     Atrial Rate :  77 BPM     P-R Int : 152 ms          QRS Dur :  98 ms      QT Int : 380 ms       P-R-T Axes :  31  41  39 degrees     QTc Int : 430 ms     Normal sinus rhythm  Normal ECG  No previous ECGs available  Confirmed by Ceasar Lorenz (2020) on 8/12/2022 2:58:32 PM     Referred By:            Confirmed By: Ceasar Lorenz     Adult Transthoracic Echo Complete W/ Cont if Necessary Per Protocol [[id number]] Resulted: 08/13/22 1222       Updated: 08/13/22 1227       Target HR (85%) 162 bpm         Max. Pred. HR (100%) 191 bpm         LA ESV Index (BP) 13.0 ml/m2         Avg E/e' ratio 6.87       ACS 2.00 cm         Ao root diam 3.2 cm         Ao pk  edd 123.0 cm/sec         Ao V2 VTI 24.3 cm         EDV(cubed) 97.3 ml         EDV(MOD-sp4) 55.2 ml         EF(MOD-sp4) 67.8 %         ESV(cubed) 24.4 ml         ESV(MOD-sp4) 17.8 ml         IVS/LVPW 0.75 cm         Lat Peak E' Edd 11.0 cm/sec         LV mass(C)d 169.9 grams         LVPWd 1.20 cm         Med Peak E' Edd 7.0 cm/sec         PA acc time 0.09 sec         PA pr(Accel) 40.8 mmHg         SI(MOD-sp4) 16.6 ml/m2         SV(MOD-sp4) 37.4 ml         Ao max PG 6.1 mmHg         Ao mean PG 3.0 mmHg         FS 37.0 %         IVSd 0.90 cm         LA dimension (2D)  2.7 cm         LVIDd 4.6 cm         LVIDs 2.9 cm         LVOT area 3.5 cm2         LVOT diam 2.10 cm         MV E/A 0.82       MV A max edd 75.3 cm/sec         MV E max edd 61.8 cm/sec         LV Melchor Vol (BSA corrected) 24.5 cm2         LV Sys Vol (BSA corrected) 7.9 cm2         TAPSE (>1.6) 1.87 cm         EF(MOD-bp) 66 %       Narrative:       · Left ventricular systolic function is normal.  · Left ventricular diastolic dysfunction is noted.                ALLERGIES: Patient has no known allergies.     Medications:      budesonide, 0.5 mg, Nebulization, BID - RT  metoprolol succinate XL, 25 mg, Oral, Q24H  pantoprazole, 40 mg, Oral, Q AM  sertraline, 50 mg, Oral, Daily        ASSESSMENT & PLAN       Major depression, recurrent (HCC), severe without psychosis  Continue Zoloft 50mg Daily     Autism spectrum disorder  Incorporated into the psychotherapeutic approach.     History of esophageal stricture and GERD  Protonix        Special precautions: Special Precautions Level 3 (q15 min checks)      Behavioral Health Treatment Plan and Problem List: I have reviewed and approved the Behavioral Health Treatment Plan and Problem list.     I spent a total of 26 minutes in direct patient care including  17 minutes face to face with the patient assessment, coordination of care, and counseling the patient on the current and follow-up treatment plans regarding his  status and situation.  Patient had no additional questions.      RIVER Styles MD     Clinician:  Abilio Styles MD  08/15/22  09:55 EDT     Dictated utilizing Dragon dictation                 Nursing assessment:      Additional Documentation    Cognitive/Ventura...       Additional Documentation     Cognitive/Neuro/Behavioral WDL    WDL       Cognitive/Neuro/Behavioral WDL     Arousal Level    opens eyes sp...       Arousal Level     Orientation    oriented x 4       Orientation     Speech    clear;spontan...       Speech     Mood/Behavior    anxious;sad;c...       Mood/Behavior     Communication Enhancement Strategies    extra time al...       Communication Enhancement Strategies     Neuro    Additional Documentation    Linda Coma ...       Additional Documentation     Pupil PERRLA    yes       Pupil PERRLA     Best Eye Response    4-->(E4) spon...       Best Eye Response     Best Motor Response    6-->(M6) obey...       Best Motor Response     Best Verbal Response    5-->(V5) orie...       Best Verbal Response     Linda Coma Scale Score    15       Morris Coma Scale Score     Motor Response    general motor...       Motor Response     General Motor Response    purposeful mo...       General Motor Response     Behavioral    General Appearance WDL    WDL       General Appearance WDL     Behavior WDL    WDL       Behavior WDL     General Appearance WDL    WDL       General Appearance WDL     Emotion/Mood/Affect WDL    X;emotion mood       Emotion/Mood/Affect WDL     Affect    affect consis...       Affect     Emotion/Mood    anxious;depre...       Emotion/Mood     Patient rated anxiety level    8       Patient rated anxiety level     Patient rated depression level    7       Patient rated depression level     Speech WDL    WDL       Speech WDL     Perceptual State WDL    WDL       Perceptual State WDL     Hallucinations    denies halluc...       Hallucinations     Perceptual State    consistent wi...        Perceptual State     Thought Process WDL    WDL       Thought Process WDL     Intellectual Performance WDL    WDL                 Verbalized Emotional State    anxiety;depre...       Verbalized Emotional State     Plan of Care Reviewed With    patient       Plan of Care Reviewed With     Patient Agreement with Plan of Care    agrees       Patient Agreement with Plan of Care     Trust Relationship/Rapport    care explaine...       Trust Relationship/Rapport     Supportive Measures    active listen...       Supportive Measures     Group Attendance  attended grou...         Group Attendance     Time Session Began  0800         Time Session Began     Time Session Ended  0815         Time Session Ended     Total Time (minutes)  15         Total Time (minutes)     Group Topic Covered  goal setting         Group Topic Covered     Group Session Detail  goals               There is no updated therapy note available at this time.       Admin Instructions:

## 2022-08-15 NOTE — PLAN OF CARE
Goal Outcome Evaluation:  Plan of Care Reviewed With: patient, mother  Patient Agreement with Plan of Care: agrees  Consent Given to Review Plan with: Mother  Progress: improving  Outcome Evaluation: Therapist met with Patient to review treatment progress and aftercare plans; Patient agreeable.      Problem: Adult Behavioral Health Plan of Care  Goal: Plan of Care Review  Outcome: Ongoing, Progressing  Flowsheets  Taken 8/15/2022 1445  Progress: improving  Plan of Care Reviewed With:   patient   mother  Patient Agreement with Plan of Care: agrees  Outcome Evaluation:   Therapist met with Patient to review treatment progress and aftercare plans   Patient agreeable.  Taken 8/12/2022 1056  Consent Given to Review Plan with: Mother  Goal: Optimized Coping Skills in Response to Life Stressors  Outcome: Ongoing, Progressing  Flowsheets (Taken 8/15/2022 1445)  Optimized Coping Skills in Response to Life Stressors: making progress toward outcome  Intervention: Promote Effective Coping Strategies  Flowsheets (Taken 8/15/2022 1445)  Supportive Measures:   active listening utilized   counseling provided   goal-setting facilitated   verbalization of feelings encouraged  Goal: Develops/Participates in Therapeutic [place] to Support Successful Transition  Outcome: Ongoing, Progressing  Flowsheets (Taken 8/15/2022 1445)  Develops/Participates in Therapeutic [place] to Support Successful Transition: making progress toward outcome  Intervention: Foster Therapeutic [place]  Flowsheets (Taken 8/15/2022 1445)  Trust Relationship/Rapport:   care explained   questions encouraged   choices provided   reassurance provided   emotional support provided   thoughts/feelings acknowledged   empathic listening provided   questions answered     DATA: Therapist met with Patient individually this date. Patient agreeable to discuss current treatment progress and discharge concerns.     Therapist spoke with Patient's mother today and provided an  update.     CLINICAL MANUVERING/INTERVENTIONS:  Assisted Patient in processing session content; acknowledged and normalized Patient’s thoughts, feelings, and concerns by utilizing a person-centered approach in efforts to build appropriate rapport and a positive therapeutic relationship with open and honest communication. Allowed Patient to ventilate regarding current stressors and triggers for negative emotions and thoughts in a safe nonjudgmental environment with unconditional positive regard, active listening skills, and empathy.     ASSESSMENT: Patient was seen 1-1 for a follow up today. Patient reports improvement in anxiety and depression today. Patient was talkative with this therapist today. He reports that he has been able to process a lot of his guilt while he has been here. We discussed how he seems to feel unnecessary guilt. He states that he knows that this is a problem and is trying to work through this. He states that he has struggled with anger and anxiety throughout his life. We discussed ways to cope with these things, and how to avoid acting impulsively. Patient was receptive to this. We also discussed further safety measures today. Patient states that if he has any thought or urges to hurt himself when he leaves here he will call a friend or his mother to talk to about it. If this doesn't help he will call the crisis hotline. If this is not helpful he will call 911, or present to the nearest emergency room. Patient's affect is brighter today. He continues to be calm and cooperative on this unit.       PLAN:   Patient will continue stabilization. Patient will continue to receive services offered by Treatment Team.     Patient will follow-up with [facility].

## 2022-08-15 NOTE — PLAN OF CARE
Problem: Adult Behavioral Health Plan of Care  Goal: Plan of Care Review  Outcome: Ongoing, Progressing  Flowsheets  Taken 8/15/2022 0807 by Dawit Sanchez, RN  Plan of Care Reviewed With: patient  Patient Agreement with Plan of Care: agrees  Taken 8/15/2022 0324 by Michelle Badillo, RN  Progress: improving   Goal Outcome Evaluation:  Plan of Care Reviewed With: patient  Patient Agreement with Plan of Care: agrees     Progress: improving

## 2022-08-15 NOTE — PROGRESS NOTES
"INPATIENT PSYCHIATRIC PROGRESS NOTE    Name:  Crow Rock  :  1995  MRN:  [id number]  Visit Number:  [id number]  Length of stay:  3    Behavioral Health Treatment Plan and Problem List: I have reviewed and approved the Behavioral Health Treatment Plan and Problem list.    SUBJECTIVE    CC/ Focus of exam: Depression    Patient's subjective status: \"Some better\"    INTERVAL HISTORY: The patient's affect is improved, no longer feeling hopeless nor experiencing any thoughts of self-harm.  Patient relates his sense of guilt and shame over perhaps a preoccupation with pornography (heterrsexual, adult) that might border on addiction.  Patient somewhat optimistic about addressing this with a psychotherapeutic effort post hospital.  We will be anticipating discharge tomorrow after establishing follow up treatment plan.  Patient slept well, appetite improving, socialization on the unit improving.  Patient notes his strong family support lives nearby his home apartment.  Described  Depression rating 5-6/10  Anxiety rating 5/10  Sleep: 8 to 9 hours  Craving: Denies.     ROS: No cardiovascular or Neurological complaints.   Appreciate cardiology consult, has not experienced any subsequent chest pain following the incident .  Will obtain a chest x-ray today.    OBJECTIVE    Vitals:    08/15/22 0839   BP: 132/93   Pulse: 100   Resp:    Temp:    SpO2:       Temp:  [97 °F (36.1 °C)-97.6 °F (36.4 °C)] 97 °F (36.1 °C)  Heart Rate:  [] 100  Resp:  [18] 18  BP: (118-146)/(86-97) 132/93    MENTAL STATUS EXAM:         Psychomotor: No psychomotor agitation/retardation, No EPS, No motor tics  Speech-normal rate, amount.  Mood/Affect:  Depressed  Thought Processes:  coherent  Thought Content:  mood congruent  Hallucination(s): none  Hopelessness: Yes  Optimistic:Yes  Suicidal Thoughts:   No  Suicidal Plan/Intent:  No  Homicidal Thoughts:  absent  Orientation: oriented x 3  Memory: Immediate, recent, recent " remote, remote intact    Lab Results (last 24 hours)     Procedure Component Value Units Date/Time    Helicobacter Pylori, IgA IgG IgM [[id number]] Collected: 08/14/22 1451    Specimen: Blood Updated: 08/14/22 1541           Imaging Results (Last 24 Hours)     ** No results found for the last 24 hours. **           Lab and x-ray studies reviewed.    ECG/EMG Results (most recent)     Procedure Component Value Units Date/Time    ECG 12 Lead [[id number]] Collected: 08/12/22 1430     Updated: 08/12/22 1434     QT Interval 384 ms      QTC Interval 411 ms     Narrative:      Test Reason : chest pain  Blood Pressure :   */*   mmHG  Vent. Rate :  69 BPM     Atrial Rate :  69 BPM     P-R Int : 172 ms          QRS Dur : 102 ms      QT Int : 384 ms       P-R-T Axes :  18  43  39 degrees     QTc Int : 411 ms    Normal sinus rhythm  Cannot rule out Inferior infarct , age undetermined  Abnormal ECG  When compared with ECG of 12-AUG-2022 04:06, (Unconfirmed)  No significant change was found    Referred By: DANNA           Confirmed By:     ECG 12 Lead [[id number]] Collected: 08/12/22 0406     Updated: 08/12/22 1501     QT Interval 380 ms      QTC Interval 430 ms     Narrative:      Test Reason : Baseline Cardiac Status  Blood Pressure :   */*   mmHG  Vent. Rate :  77 BPM     Atrial Rate :  77 BPM     P-R Int : 152 ms          QRS Dur :  98 ms      QT Int : 380 ms       P-R-T Axes :  31  41  39 degrees     QTc Int : 430 ms    Normal sinus rhythm  Normal ECG  No previous ECGs available  Confirmed by Ceasar Lorenz ([id number]) on 8/12/2022 2:58:32 PM    Referred By:            Confirmed By: Ceasar Lorenz    Adult Transthoracic Echo Complete W/ Cont if Necessary Per Protocol [[id number]] Resulted: 08/13/22 1222     Updated: 08/13/22 1227     Target HR (85%) 162 bpm      Max. Pred. HR (100%) 191 bpm      LA ESV Index (BP) 13.0 ml/m2      Avg E/e' ratio 6.87     ACS 2.00 cm      Ao root diam 3.2 cm      Ao pk melvin 123.0 cm/sec      Ao  V2 VTI 24.3 cm      EDV(cubed) 97.3 ml      EDV(MOD-sp4) 55.2 ml      EF(MOD-sp4) 67.8 %      ESV(cubed) 24.4 ml      ESV(MOD-sp4) 17.8 ml      IVS/LVPW 0.75 cm      Lat Peak E' Edd 11.0 cm/sec      LV mass(C)d 169.9 grams      LVPWd 1.20 cm      Med Peak E' Edd 7.0 cm/sec      PA acc time 0.09 sec      PA pr(Accel) 40.8 mmHg      SI(MOD-sp4) 16.6 ml/m2      SV(MOD-sp4) 37.4 ml      Ao max PG 6.1 mmHg      Ao mean PG 3.0 mmHg      FS 37.0 %      IVSd 0.90 cm      LA dimension (2D)  2.7 cm      LVIDd 4.6 cm      LVIDs 2.9 cm      LVOT area 3.5 cm2      LVOT diam 2.10 cm      MV E/A 0.82     MV A max edd 75.3 cm/sec      MV E max edd 61.8 cm/sec      LV Melchor Vol (BSA corrected) 24.5 cm2      LV Sys Vol (BSA corrected) 7.9 cm2      TAPSE (>1.6) 1.87 cm      EF(MOD-bp) 66 %     Narrative:      · Left ventricular systolic function is normal.  · Left ventricular diastolic dysfunction is noted.              ALLERGIES: Patient has no known allergies.    Medications:     budesonide, 0.5 mg, Nebulization, BID - RT  metoprolol succinate XL, 25 mg, Oral, Q24H  pantoprazole, 40 mg, Oral, Q AM  sertraline, 50 mg, Oral, Daily       ASSESSMENT & PLAN      Major depression, recurrent (HCC), severe without psychosis  Continue Zoloft 50mg Daily     Autism spectrum disorder  Incorporated into the psychotherapeutic approach.     History of esophageal stricture and GERD  Protonix      Special precautions: Special Precautions Level 3 (q15 min checks)     Behavioral Health Treatment Plan and Problem List: I have reviewed and approved the Behavioral Health Treatment Plan and Problem list.    I spent a total of 26 minutes in direct patient care including  17 minutes face to face with the patient assessment, coordination of care, and counseling the patient on the current and follow-up treatment plans regarding his status and situation.  Patient had no additional questions.     RIVER Styles MD    Clinician:  Abilio Styles,  MD  08/15/22  09:55 EDT    Dictated utilizing Dragon dictation

## 2022-08-15 NOTE — PLAN OF CARE
Goal Outcome Evaluation:  Plan of Care Reviewed With: patient  Patient Agreement with Plan of Care: agrees     Progress: improving  Outcome Evaluation: Patient rates anxiety 8 and depression 7, and denies thoughts of harming self and others, and denies hallucinations

## 2022-08-16 VITALS
TEMPERATURE: 97.7 F | HEIGHT: 72 IN | DIASTOLIC BLOOD PRESSURE: 84 MMHG | SYSTOLIC BLOOD PRESSURE: 127 MMHG | HEART RATE: 84 BPM | OXYGEN SATURATION: 97 % | BODY MASS INDEX: 30.94 KG/M2 | RESPIRATION RATE: 18 BRPM | WEIGHT: 228.4 LBS

## 2022-08-16 LAB
H PYLORI IGA SER-ACNC: 12 UNITS (ref 0–8.9)
H PYLORI IGG SER IA-ACNC: 0.14 INDEX VALUE (ref 0–0.79)
H PYLORI IGM SER-ACNC: 14 UNITS (ref 0–8.9)
QT INTERVAL: 384 MS
QTC INTERVAL: 411 MS

## 2022-08-16 PROCEDURE — 94799 UNLISTED PULMONARY SVC/PX: CPT

## 2022-08-16 PROCEDURE — 99239 HOSP IP/OBS DSCHRG MGMT >30: CPT | Performed by: PSYCHIATRY & NEUROLOGY

## 2022-08-16 RX ADMIN — PANTOPRAZOLE SODIUM 40 MG: 40 TABLET, DELAYED RELEASE ORAL at 05:34

## 2022-08-16 RX ADMIN — SERTRALINE 50 MG: 50 TABLET, FILM COATED ORAL at 08:44

## 2022-08-16 RX ADMIN — METOPROLOL SUCCINATE 25 MG: 25 TABLET, EXTENDED RELEASE ORAL at 08:44

## 2022-08-16 RX ADMIN — HYDROXYZINE HYDROCHLORIDE 50 MG: 50 TABLET ORAL at 08:44

## 2022-08-16 NOTE — NURSING NOTE
Pt resting comfortably in bed at this time w/o s/s of anxiety. Sleeping comfortably. Will continue to monitor.

## 2022-08-16 NOTE — PLAN OF CARE
Goal Outcome Evaluation:  Plan of Care Reviewed With: patient  Patient Agreement with Plan of Care: agrees        Pt states anxiety 7, depression 6. Pt is calm and cooperative with staff, med compliant.

## 2022-08-16 NOTE — PROGRESS NOTES
Discharge Planning Assessment  [facility]     Patient Name: Crow Rock  MRN: [id number]  Today's Date: 8/16/2022    Admit Date: 8/12/2022    Patient is being discharged home today. This therapist met with Patient who denies SI, HI, and AVH. Patient states that he is looking forward to going home today, and states that he is feeling confident about being discharged. This therapist completed safety planning with Patient's mother. She will be picking him up today. Patient will follow up with [facility].         ARELY Post

## 2022-08-16 NOTE — DISCHARGE SUMMARY
Date of Discharge:  8/16/2022    Discharge Diagnosis:Principal Problem:    Severe episode of recurrent major depressive disorder, without psychotic features (HCC)  Active Problems:    Autism spectrum disorder    Gastroesophageal reflux disease with esophagitis        Presenting Problem/History of Present Illness:Patient was admitted to the hospital on August 12 having presented depressed voicing intent to self-harm, voluntarily admitted for safety evaluation and treatment, see admission note for details.         Hospital Course:      Patient was admitted for safety and stabilization and was placed on standard precautions.  Routine labs were checked.  Patient was assigned a masters level therapist and provided with an opportunity to participate in group and individual therapy on the unit.  Patient seen on a daily basis for evaluation and supportive therapy.  Patient's depressive symptomatology improved steadily through his brief hospital stay, psychotropic medications limited to Sertraline 50 mg daily.  Focus of the psychotherapy effort on the patient's sense of guilt and shame with online pornography.  At discharge patient's affect was appropriate and he was free of any thoughts of harming self or others, certainly no psychotic determinates.  Patient to follow-up at the [facility] clinic, see below for details.  Patient expressing positive plans for his future.         Consults:   Consults     Date and Time Order Name Status Description    8/12/2022  2:49 PM Inpatient Cardiology Consult Completed           Labs:  Lab Results (all)     Procedure Component Value Units Date/Time    Helicobacter Pylori, IgA IgG IgM [[id number]] Collected: 08/14/22 1451    Specimen: Blood Updated: 08/14/22 1541    Lipid Panel [[id number]]  (Abnormal) Collected: 08/13/22 0519    Specimen: Blood Updated: 08/13/22 0623     Total Cholesterol 184 mg/dL      Triglycerides 127 mg/dL      HDL Cholesterol 22 mg/dL      LDL Cholesterol  139 mg/dL       VLDL Cholesterol 23 mg/dL      LDL/HDL Ratio 6.21    Narrative:      Cholesterol Reference Ranges  (U.S. Department of Health and Human Services ATP III Classifications)    Desirable          <200 mg/dL  Borderline High    200-239 mg/dL  High Risk          >240 mg/dL      Triglyceride Reference Ranges  (U.S. Department of Health and Human Services ATP III Classifications)    Normal           <150 mg/dL  Borderline High  150-199 mg/dL  High             200-499 mg/dL  Very High        >500 mg/dL    HDL Reference Ranges  (U.S. Department of Health and Human Services ATP III Classifications)    Low     <40 mg/dl (major risk factor for CHD)  High    >60 mg/dl ('negative' risk factor for CHD)        LDL Reference Ranges  (U.S. Department of Health and Human Services ATP III Classifications)    Optimal          <100 mg/dL  Near Optimal     100-129 mg/dL  Borderline High  130-159 mg/dL  High             160-189 mg/dL  Very High        >189 mg/dL    Troponin [[id number]]  (Normal) Collected: 08/12/22 1430    Specimen: Blood Updated: 08/12/22 1510     Troponin T <0.010 ng/mL     Narrative:      Troponin T Reference Range:  <= 0.03 ng/mL-   Negative for AMI  >0.03 ng/mL-     Abnormal for myocardial necrosis.  Clinicians would have to utilize clinical acumen, EKG, Troponin and serial changes to determine if it is an Acute Myocardial Infarction or myocardial injury due to an underlying chronic condition.       Results may be falsely decreased if patient taking Biotin.      T4, Free [[id number]]  (Normal) Collected: 08/12/22 1430    Specimen: Blood Updated: 08/12/22 1510     Free T4 1.49 ng/dL     Narrative:      Results may be falsely increased if patient taking Biotin.      TSH [[id number]]  (Normal) Collected: 08/12/22 1430    Specimen: Blood Updated: 08/12/22 1510     TSH 2.100 uIU/mL     Hemoglobin A1c [[id number]]  (Normal) Collected: 08/12/22 1430    Specimen: Blood Updated: 08/12/22 1455     Hemoglobin A1C 5.20 %      Narrative:      Hemoglobin A1C Ranges:    Increased Risk for Diabetes  5.7% to 6.4%  Diabetes                     >= 6.5%  Diabetic Goal                < 7.0%          Imaging:  Imaging Results (All)     Procedure Component Value Units Date/Time    XR Chest PA & Lateral [[id number]] Collected: 08/15/22 1209     Updated: 08/15/22 1211    Narrative:      EXAM:    XR Chest, 2 Views     EXAM DATE:    8/15/2022 11:25 AM     CLINICAL HISTORY:    chest pain     TECHNIQUE:    Frontal and lateral views of the chest.     COMPARISON:    No relevant prior studies available.     FINDINGS:    Lungs:  Unremarkable.  No consolidation.    Pleural space:  Unremarkable.  No pneumothorax.    Heart:  Unremarkable.  No cardiomegaly.    Mediastinum:  Unremarkable.    Bones/joints:  Unremarkable.       Impression:        Unremarkable radiographic appearance of the chest.     This report was finalized on 8/15/2022 12:09 PM by Dr. Paul Aquino MD.             Condition on Discharge:  improved    Prognosis: Fair    Vital Signs  Temp:  [97 °F (36.1 °C)-97.7 °F (36.5 °C)] 97.7 °F (36.5 °C)  Heart Rate:  [84-99] 84  Resp:  [18] 18  BP: (127)/(83-84) 127/84    Discharge Disposition  Home or Self Care    Discharge Medications     Discharge Medications      New Medications      Instructions Start Date   sertraline 50 MG tablet  Commonly known as: ZOLOFT   50 mg, Oral, Daily   Start Date: August 17, 2022        Continue These Medications      Instructions Start Date   fluticasone 220 MCG/ACT inhaler  Commonly known as: FLOVENT HFA   2 puffs, Inhalation, 2 Times Daily - RT             Discharge Diet: Regular    Activity at Discharge: No restrictions    Follow-up Appointments:    Patient has follow-up at the [facility] with SHARATH Bullock at 1 PM on August 30.  Staff there is to contact the patient's mother about an appointment with therapist as soon as available.      Abilio Styles MD  08/16/22  10:02 EDT  Time spent with the discharge  process >30 minutes.     Dictated utilizing Dragon dictation

## 2022-08-17 NOTE — PAYOR COMM NOTE
"Crow Talley (27 y.o. Male)             Date of Birth   1995    Social Security Number       Address   [address] [facility] [address]    Home Phone   [phone]    MRN   [id number]       Latter-day   Orthodox    Marital Status   Single                            Admission Date   8/12/22    Admission Type   Urgent    Admitting Provider   Sumeet Styles MD    Attending Provider       Department, Room/Bed   [facility] ADULT PSYCHIATRIC, 1020/2S       Discharge Date   8/16/2022    Discharge Disposition   Home or Self Care    Discharge Destination                               Attending Provider: (none)   Allergies: No Known Allergies    Isolation: None   Infection: None   Code Status: Prior   Advance Care Planning Activity    Ht: 182.9 cm (72\")   Wt: 104 kg (228 lb 6.4 oz)    Admission Cmt: None   Principal Problem: Severe episode of recurrent major depressive disorder, without psychotic features (HCC) [F33.2]                 Active Insurance as of 8/12/2022     Primary Coverage     Payor Plan Insurance Group Employer/Plan Group    ANTHEM MEDICARE REPLACEMENT ANTHEM MEDICARE ADVANTAGE [id number]     Payor Plan Address Payor Plan Phone Number Payor Plan Fax Number Effective Dates    [address] [phone]  3/1/2020 - None Entered    [facility] [address]       Subscriber Name Subscriber Birth Date Member ID       CROW TALLEY 1995 [id number]           Secondary Coverage     Payor Plan Insurance Group Employer/Plan Group    [place]      Payor Plan Address Payor Plan Phone Number Payor Plan Fax Number Effective Dates    [address] [phone]  12/10/2019 - None Entered    [facility] [id number]       Subscriber Name Subscriber Birth Date Member ID       CROW TALLEY 1995 [id number]                 Emergency Contacts      (Rel.) Home Phone Work Phone Mobile Phone    Riki Talley (Mother) [phone] -- --    " MARCELLO ROCKLEY (Sister) [phone] -- --        Please attach this discharge information to the authorization # [id number]    RE: Crow Rock  : 1995  Please see above for additional demographics     Discharge date: 22    Discharge diagnosis:   F33.2 - Severe episode of recurrent major depressive disorder, without psychotic features  Active Problems:  F84.0 - Autism spectrum disorder    Follow Up:  Initial Evaluation with SHARATH Bullock  Tuesday Aug 30, 2022 1:30 PM  New: Please complete packet and arrive 30 minutes prior to appointment with insurance cards and ID. [facility] [address] [place] [address]  [phone]     Initial Evaluation with Social Troy LOJA LCSW  Thursday Sep 22, 2022 2:30 PM  New: Please arrive 30 minutes prior to appointment and bring all medication, insurance cards, and ID.  [facility] [address] [place] [address]  [phone]         Sep 22, 2022  2:30 PM   Initial Evaluation with Mera Combs LCSW   [facility] ([place]) [address] [place] [address]   [phone]   New: Please arrive 30 minutes prior to appointment and bring all medication, insurance cards, and ID.   This is the soonest available appointment. They office will place you on a call list if a sooner appointment becomes available.      Discharge summary:    Abilio Styles MD    Physician   Psychiatry   Discharge Summary      Signed   Date of Service:  22 1002   Creation Time:  22 1002              Signed              Show:Clear all  [x]Manual[x]Template[]Copied    Added by:  [x]Abilio Styles MD      []Jeovany for details       Date of Discharge:  2022     Discharge Diagnosis:Principal Problem:    Severe episode of recurrent major depressive disorder, without psychotic features  (HCC)  Active Problems:    Autism spectrum disorder    Gastroesophageal reflux disease with esophagitis           Presenting Problem/History of Present Illness:Patient was admitted to the hospital on August 12 having presented depressed voicing intent to self-harm, voluntarily admitted for safety evaluation and treatment, see admission note for details.           Hospital Course:        Patient was admitted for safety and stabilization and was placed on standard precautions.  Routine labs were checked.  Patient was assigned a masters level therapist and provided with an opportunity to participate in group and individual therapy on the unit.  Patient seen on a daily basis for evaluation and supportive therapy.  Patient's depressive symptomatology improved steadily through his brief hospital stay, psychotropic medications limited to Sertraline 50 mg daily.  Focus of the psychotherapy effort on the patient's sense of guilt and shame with online pornography.  At discharge patient's affect was appropriate and he was free of any thoughts of harming self or others, certainly no psychotic determinates.  Patient to follow-up at the [facility] clinic, see below for details.  Patient expressing positive plans for his future.            Consults:           Consults      Date and Time Order Name Status Description     8/12/2022  2:49 PM Inpatient Cardiology Consult Completed               Labs:           Lab Results (all)      Procedure Component Value Units Date/Time     Helicobacter Pylori, IgA IgG IgM [[id number]] Collected: 08/14/22 1451     Specimen: Blood Updated: 08/14/22 1541     Lipid Panel [[id number]]  (Abnormal) Collected: 08/13/22 0519     Specimen: Blood Updated: 08/13/22 0623       Total Cholesterol 184 mg/dL         Triglycerides 127 mg/dL         HDL Cholesterol 22 mg/dL         LDL Cholesterol  139 mg/dL         VLDL Cholesterol 23 mg/dL         LDL/HDL Ratio 6.21     Narrative:       Cholesterol Reference  Ranges  (U.S. Department of Health and Human Services ATP III Classifications)     Desirable          <200 mg/dL  Borderline High    200-239 mg/dL  High Risk          >240 mg/dL        Triglyceride Reference Ranges  (U.S. Department of Health and Human Services ATP III Classifications)     Normal           <150 mg/dL  Borderline High  150-199 mg/dL  High             200-499 mg/dL  Very High        >500 mg/dL     HDL Reference Ranges  (U.S. Department of Health and Human Services ATP III Classifications)     Low     <40 mg/dl (major risk factor for CHD)  High    >60 mg/dl ('negative' risk factor for CHD)           LDL Reference Ranges  (U.S. Department of Health and Human Services ATP III Classifications)     Optimal          <100 mg/dL  Near Optimal     100-129 mg/dL  Borderline High  130-159 mg/dL  High             160-189 mg/dL  Very High        >189 mg/dL     Troponin [[id number]]  (Normal) Collected: 08/12/22 1430     Specimen: Blood Updated: 08/12/22 1510       Troponin T <0.010 ng/mL       Narrative:       Troponin T Reference Range:  <= 0.03 ng/mL-   Negative for AMI  >0.03 ng/mL-     Abnormal for myocardial necrosis.  Clinicians would have to utilize clinical acumen, EKG, Troponin and serial changes to determine if it is an Acute Myocardial Infarction or myocardial injury due to an underlying chronic condition.         Results may be falsely decreased if patient taking Biotin.        T4, Free [[id number]]  (Normal) Collected: 08/12/22 1430     Specimen: Blood Updated: 08/12/22 1510       Free T4 1.49 ng/dL       Narrative:       Results may be falsely increased if patient taking Biotin.        TSH [[id number]]  (Normal) Collected: 08/12/22 1430     Specimen: Blood Updated: 08/12/22 1510       TSH 2.100 uIU/mL       Hemoglobin A1c [[id number]]  (Normal) Collected: 08/12/22 1430     Specimen: Blood Updated: 08/12/22 1455       Hemoglobin A1C 5.20 %       Narrative:       Hemoglobin A1C Ranges:     Increased Risk  for Diabetes  5.7% to 6.4%  Diabetes                     >= 6.5%  Diabetic Goal                < 7.0%             Imaging:           Imaging Results (All)      Procedure Component Value Units Date/Time     XR Chest PA & Lateral [[id number]] Collected: 08/15/22 1209       Updated: 08/15/22 1211     Narrative:       EXAM:    XR Chest, 2 Views     EXAM DATE:    8/15/2022 11:25 AM     CLINICAL HISTORY:    chest pain     TECHNIQUE:    Frontal and lateral views of the chest.     COMPARISON:    No relevant prior studies available.     FINDINGS:    Lungs:  Unremarkable.  No consolidation.    Pleural space:  Unremarkable.  No pneumothorax.    Heart:  Unremarkable.  No cardiomegaly.    Mediastinum:  Unremarkable.    Bones/joints:  Unremarkable.        Impression:         Unremarkable radiographic appearance of the chest.     This report was finalized on 8/15/2022 12:09 PM by Dr. Paul Aquino MD.                Condition on Discharge:  improved     Prognosis: Fair     Vital Signs  Temp:  [97 °F (36.1 °C)-97.7 °F (36.5 °C)] 97.7 °F (36.5 °C)  Heart Rate:  [84-99] 84  Resp:  [18] 18  BP: (127)/(83-84) 127/84     Discharge Disposition  Home or Self Care     Discharge Medications           Discharge Medications            New Medications      Instructions Start Date   sertraline 50 MG tablet  Commonly known as: ZOLOFT    50 mg, Oral, Daily    Start Date: August 17, 2022                    Continue These Medications      Instructions Start Date   fluticasone 220 MCG/ACT inhaler  Commonly known as: FLOVENT HFA    2 puffs, Inhalation, 2 Times Daily - RT                  Discharge Diet: Regular     Activity at Discharge: No restrictions     Follow-up Appointments:     Patient has follow-up at the [facility] with SHARATH Bullock at 1 PM on August 30.  Staff there is to contact the patient's mother about an appointment with therapist as soon as available.     Abilio Styles MD  08/16/22  10:02 EDT  Time spent with the  discharge process >30 minutes.      Dictated utilizing Dragon dictation                  Routing History

## 2022-08-18 ENCOUNTER — TELEPHONE (OUTPATIENT)
Dept: CARDIOLOGY | Facility: CLINIC | Age: 27
End: 2022-08-18

## 2022-08-18 NOTE — TELEPHONE ENCOUNTER
called and given message per Am Paradise EARLY to his mother Ana.  She voiced understanding and will call his PCP to make an appt.

## 2022-08-18 NOTE — TELEPHONE ENCOUNTER
----- Message from SHARATH Valle sent at 8/17/2022  3:35 PM EDT -----  His labs that were performed as an in patient need follow up. Does he have appt with PCP or GI? He needs to follow up with PCP or GI to discuss results and receive treatment.   Thanks,  Jasmine

## 2022-08-23 ENCOUNTER — TELEPHONE (OUTPATIENT)
Dept: CARDIOLOGY | Facility: CLINIC | Age: 27
End: 2022-08-23

## 2022-08-23 NOTE — TELEPHONE ENCOUNTER
----- Message from SHARATH Valle sent at 8/22/2022 12:54 PM EDT -----  If no answer, please mail him a certified letter and send his PCP a copy of that same letter as well. Thanks

## 2022-08-30 ENCOUNTER — OFFICE VISIT (OUTPATIENT)
Dept: PSYCHIATRY | Facility: CLINIC | Age: 27
End: 2022-08-30

## 2022-08-30 VITALS
DIASTOLIC BLOOD PRESSURE: 94 MMHG | SYSTOLIC BLOOD PRESSURE: 130 MMHG | HEIGHT: 72 IN | HEART RATE: 89 BPM | BODY MASS INDEX: 30.48 KG/M2 | WEIGHT: 225 LBS

## 2022-08-30 DIAGNOSIS — F33.1 MODERATE EPISODE OF RECURRENT MAJOR DEPRESSIVE DISORDER: Primary | ICD-10-CM

## 2022-08-30 DIAGNOSIS — F84.0 AUTISM SPECTRUM DISORDER: ICD-10-CM

## 2022-08-30 DIAGNOSIS — Z86.59 HISTORY OF ADHD: ICD-10-CM

## 2022-08-30 DIAGNOSIS — F41.1 GENERALIZED ANXIETY DISORDER: ICD-10-CM

## 2022-08-30 PROCEDURE — 90792 PSYCH DIAG EVAL W/MED SRVCS: CPT | Performed by: NURSE PRACTITIONER

## 2022-09-13 DIAGNOSIS — F33.1 MODERATE EPISODE OF RECURRENT MAJOR DEPRESSIVE DISORDER: ICD-10-CM

## 2022-09-13 DIAGNOSIS — F41.1 GENERALIZED ANXIETY DISORDER: ICD-10-CM

## 2022-09-13 NOTE — TELEPHONE ENCOUNTER
Alicia can you resend zoloft to [facility]? It was sent to wrong pharmacy per mom. He got at hospital previously. Thank you

## 2022-09-22 ENCOUNTER — OFFICE VISIT (OUTPATIENT)
Dept: PSYCHIATRY | Facility: CLINIC | Age: 27
End: 2022-09-22

## 2022-09-22 DIAGNOSIS — F84.0 AUTISM SPECTRUM DISORDER: ICD-10-CM

## 2022-09-22 DIAGNOSIS — F41.1 GENERALIZED ANXIETY DISORDER: ICD-10-CM

## 2022-09-22 DIAGNOSIS — F33.1 MODERATE EPISODE OF RECURRENT MAJOR DEPRESSIVE DISORDER: Primary | ICD-10-CM

## 2022-09-22 PROCEDURE — 90837 PSYTX W PT 60 MINUTES: CPT | Performed by: SOCIAL WORKER

## 2022-09-22 NOTE — PROGRESS NOTES
"Date: 09/22/2022   Time In: 1420  Time Out: 1514    PROGRESS NOTE  Data:  Crow Rock is a 27 y.o. male who presents today for individual therapy session at [facility].     ICD-10-CM ICD-9-CM   1. Moderate episode of recurrent major depressive disorder (HCC)  F33.1 296.32   2. Generalized anxiety disorder  F41.1 300.02   3. Autism spectrum disorder  F84.0 299.00     Patient presents to first session with this provider. Patient reports he has a history of depression. He reports depressed mood and anhedonia. He reports having \"bad habits\" online. Reports Using friends pictures in a sexual way, to talk to other people online. He says he was told it may be an addiction. He says feelings of loneliness triggered the interactions again after a period of not engaging in those behaviors. He reports feeling guilty and ashamed for doing it and says he acknowledges it was wrong to use his friends pictures. He says he has not had as many impulses to do this since his hospitalization. He reports talking to the same people but says he did not use the pictures again. He reports always being social in high school and college. He reports having low self esteem and feels like \"no one likes me\". He says he does know that his behavior with the pictures has contributed to the loss of these relationships. He reports questioning why he does not have a girlfriend and reports that a part of him doesn't want to change these behaviors online and says this may contribute to not having relationships. He reports having a goal to be a  but says his disability gets in the way. He reports his Sleeping and eating has improved since starting medications. He reports his support system includes his best friend from college who lives in [place], mother, two sisters and nephews. He reports goals for therapy are: \"live a life outside of just sitting in my apartment and not do the behaviors online\". Patient denies " SI      Clinical Maneuvering/Intervention:    (Scales based on 0 - 10 with 10 being the worst)  Depression:   na Anxiety:  na       Assisted patient in processing above session content; acknowledged and normalized patient’s thoughts, feelings, and concerns.  Discussed triggers associated with patient's depression.  Also discussed coping skills for patient to implement such as using support system, eating adequate nutrition, participating in activities instead of isolating.    Allowed patient to freely discuss issues without interruption or judgment. Provided safe, confidential environment to facilitate the development of positive therapeutic relationship and encourage open, honest communication. Assisted patient in identifying risk factors which would indicate the need for higher level of care including thoughts to harm self or others and/or self-harming behavior and encouraged patient to contact this office, call 911, or present to the nearest emergency room should any of these events occur. Discussed crisis intervention services and means to access. Patient adamantly and convincingly denies current suicidal or homicidal ideation or perceptual disturbance.    Assessment   Patient appears to maintain relative stability as compared to their baseline.  However, patient continues to struggle with mood which continues to cause impairment in important areas of functioning.  As a result, they can be reasonably expected to continue to benefit from treatment and would likely be at increased risk for decompensation otherwise.    Mental Status Exam:   Hygiene:   good  Cooperation:  Cooperative  Eye Contact:  Poor  Psychomotor Behavior:  Appropriate  Affect:  Appropriate  Mood: anxious  Speech:  Rambling  Thought Process:  Linear  Thought Content:  Mood congruent  Suicidal:  None  Homicidal:  None  Hallucinations:  None  Delusion:  None  Memory:  Intact  Orientation:  Person, Place, Time and Situation  Reliability:   fair  Insight:  Fair  Judgement:  Poor  Impulse Control:  Poor  Physical/Medical Issues:  No      Patient's Support Network Includes:  mother    Functional Status: Mild impairment     Progress toward goal: Not at goal    Prognosis: Fair with Ongoing Treatment          Plan     Patient will continue in individual outpatient therapy with focus on improved functioning and coping skills, maintaining stability, and avoiding decompensation and the need for higher level of care.    Patient will adhere to medication regimen as prescribed and report any side effects. Patient will contact this office, call 911 or present to the nearest emergency room should suicidal or homicidal ideations occur. Provide Cognitive Behavioral Therapy and Solution Focused Therapy to improve functioning, maintain stability, and avoid decompensation and the need for higher level of care.     Return in about Return in about 2 weeks (around 10/6/2022). or earlier if symptoms worsen or fail to improve.            This document has been electronically signed by Mera Combs LCSW  September 22, 2022 14:17 EDT      Part of this note may be an electronic transcription/translation of spoken language to printed text using the Dragon Dictation System.

## 2022-09-29 ENCOUNTER — OFFICE VISIT (OUTPATIENT)
Dept: PSYCHIATRY | Facility: CLINIC | Age: 27
End: 2022-09-29

## 2022-09-29 VITALS
HEIGHT: 72 IN | DIASTOLIC BLOOD PRESSURE: 78 MMHG | HEART RATE: 68 BPM | WEIGHT: 231 LBS | BODY MASS INDEX: 31.29 KG/M2 | SYSTOLIC BLOOD PRESSURE: 120 MMHG

## 2022-09-29 DIAGNOSIS — Z86.59 HISTORY OF ADHD: Primary | ICD-10-CM

## 2022-09-29 DIAGNOSIS — F84.0 AUTISM SPECTRUM DISORDER: ICD-10-CM

## 2022-09-29 DIAGNOSIS — F33.1 MODERATE EPISODE OF RECURRENT MAJOR DEPRESSIVE DISORDER: ICD-10-CM

## 2022-09-29 DIAGNOSIS — F41.1 GENERALIZED ANXIETY DISORDER: ICD-10-CM

## 2022-09-29 PROCEDURE — 99214 OFFICE O/P EST MOD 30 MIN: CPT | Performed by: NURSE PRACTITIONER

## 2022-09-29 RX ORDER — SERTRALINE HYDROCHLORIDE 100 MG/1
100 TABLET, FILM COATED ORAL DAILY
Qty: 30 TABLET | Refills: 1 | Status: SHIPPED | OUTPATIENT
Start: 2022-09-29 | End: 2022-11-28

## 2022-09-29 RX ORDER — ATOMOXETINE 40 MG/1
40 CAPSULE ORAL DAILY
Qty: 30 CAPSULE | Refills: 1 | Status: SHIPPED | OUTPATIENT
Start: 2022-09-29 | End: 2022-11-28

## 2022-09-29 NOTE — PROGRESS NOTES
Subjective   Crow Rock is a 27 y.o. male who presents today for follow up    Chief Complaint:  Depression and anxiety    History of Present Illness:   History of Present Illness  C[place] presents today for medication management follow-up.  Reports that he continues to struggle with both anxiety and depression.  Rates overall depression 7-8/10, rates anxiety 6-8/10 with 10 being the worst.  Says that he continues to deal with the personal issues of losing his friends, and feels this has been one of the contributing factors to continued depression. Verbalizes that he feels that this will eventually resolve or improve.  Feels that he is doing well with current medication regimen, but feels there could be improvement in symptoms.  Taking Zoloft as prescribed, denies any adverse effects.  Has other history of autism and ADHD that was initially diagnosed in 2013.  He does admit to racing thoughts, becoming easily distracted and is often unable to complete tasks.  Has not tried any medications in past to help with symptoms associated with ADHD.  Reports improvement in sleep, obtaining about 8 hours during the night.  Appetite has also improved.  He does admit to having some intrusive thoughts since last visit, currently adamantly denies any S/HI or AV hallucinations.     The following portions of the patient's history were reviewed and updated as appropriate: allergies, current medications, past family history, past medical history, past social history, past surgical history and problem list.      Past Medical History:  Past Medical History:   Diagnosis Date   • Allergy to bean 2021    Soybeans   • Asperger syndrome    • Chronic gastritis    • Cow's milk allergy 2021   • Delayed gastric emptying    • Diarrhea    • Dysphagia     MOTHER REPORTS PATIENT HAS RECENTLY HAS GOTTEN CHOKED EASILY AND GETS FOOD STUCK IN THROAT.   • Egg allergy 2021   • Epigastric abdominal pain    • Esophagitis    • GERD  (gastroesophageal reflux disease)    • H/O seasonal allergies    • Hearing loss     Patient's mother reported more loss on the left side but that patient has loss bilateral.  No use of hearing devices.    • Obesity    • Self-injurious behavior    • Wears glasses    • Wheat allergy 2021       Social History:  Social History     Socioeconomic History   • Marital status: Single   Tobacco Use   • Smoking status: Never Smoker   • Smokeless tobacco: Never Used   Vaping Use   • Vaping Use: Never used   Substance and Sexual Activity   • Alcohol use: No   • Drug use: No   • Sexual activity: Defer       Family History:  Family History   Problem Relation Age of Onset   • No Known Problems Mother    • No Known Problems Father    • Colon cancer Neg Hx        Past Surgical History:  Past Surgical History:   Procedure Laterality Date   • EAR TUBES     • ENDOSCOPY N/A 7/15/2017    Procedure: ESOPHAGOGASTRODUODENOSCOPY with bx , removal foreign body;  Surgeon: Saroj Scott MD;  Location: [facility] OR;  Service:    • ENDOSCOPY N/A 8/24/2017    Procedure: ESOPHAGOGASTRODUODENOSCOPY ESOPHAGEAL DILATATION;  Surgeon: Saroj Scott MD;  Location: [facility] ENDOSCOPY;  Service:    • ENDOSCOPY N/A 4/9/2019    Procedure: ESOPHAGOGASTRODUODENOSCOPY FOR FOREIGN BODY, biopsy, and esophageal dilitation;  Surgeon: Tyrone Herzog MD;  Location: [facility] ENDOSCOPY;  Service: Gastroenterology   • ENDOSCOPY N/A 5/14/2019    Procedure: ESOPHAGOGASTRODUODENOSCOPY with biopsy;  Surgeon: Tyrone Herzog MD;  Location: [facility] ENDOSCOPY;  Service: Gastroenterology   • ENDOSCOPY N/A 3/5/2020    Procedure: ESOPHAGOGASTRODUODENOSCOPY;  Surgeon: Tyrone Herzog MD;  Location: [facility] ENDOSCOPY;  Service: Gastroenterology;  Laterality: N/A;   • ENDOSCOPY N/A 6/18/2020    Procedure: ESOPHAGOGASTRODUODENOSCOPY;  Surgeon: Tyrone Herzog MD;  Location: [facility] ENDOSCOPY;  Service: Gastroenterology;  Laterality: N/A;   • ENDOSCOPY N/A 5/11/2021    Procedure:  ESOPHAGOGASTRODUODENOSCOPY WITH SAVORY DILITATION AND BIOPSY;  Surgeon: Nellie Xavier MD;  Location: [facility] ENDOSCOPY;  Service: Gastroenterology;  Laterality: N/A;   • ENDOSCOPY N/A 6/7/2021    Procedure: ESOPHAGOGASTRODUODENOSCOPY WITH SAVORY DILATATION AND BIOPSY ;  Surgeon: Nellie Xavier MD;  Location: [facility] ENDOSCOPY;  Service: Gastroenterology;  Laterality: N/A;   • ENDOSCOPY N/A 7/27/2021    Procedure: ESOPHAGOGASTRODUODENOSCOPY WITH DILATATION AND BIOPSY;  Surgeon: Nellie Xavier MD;  Location: [facility] ENDOSCOPY;  Service: Gastroenterology;  Laterality: N/A;   • ENDOSCOPY N/A 2/5/2022    Procedure: ESOPHAGOGASTRODUODENOSCOPY WITH FOREIGN BODY REMOVAL AND BIOPSY;  Surgeon: Levy Hester MD;  Location: [facility] OR;  Service: General;  Laterality: N/A;   • UPPER GASTROINTESTINAL ENDOSCOPY  04/09/2019   • UPPER GASTROINTESTINAL ENDOSCOPY  05/14/2019       Problem List:  Patient Active Problem List   Diagnosis   • Gastroesophageal reflux disease with esophagitis   • Chronic gastritis without bleeding   • Dysphagia   • Heartburn   • Delayed gastric emptying   • Esophagitis   • Epigastric pain   • Esophagitis, eosinophilic   • Gastroparesis   • Esophageal dysphagia   • Esophageal stricture   • Esophageal foreign body, initial encounter   • Severe episode of recurrent major depressive disorder, without psychotic features (HCC)   • Autism spectrum disorder       Allergy:   No Known Allergies     Current Medications:   Current Outpatient Medications   Medication Sig Dispense Refill   • fluticasone (FLOVENT HFA) 220 MCG/ACT inhaler Inhale 2 puffs 2 (Two) Times a Day.     • sertraline (ZOLOFT) 100 MG tablet Take 1 tablet by mouth Daily for 60 days. Indications: Major Depressive Disorder 30 tablet 1   • atomoxetine (Strattera) 40 MG capsule Take 1 capsule by mouth Daily for 60 days. 30 capsule 1     No current facility-administered medications for this visit.       Review of Symptoms:   "  Review of Systems   Constitutional: Negative for activity change, appetite change, fatigue, unexpected weight gain and unexpected weight loss.   Respiratory: Negative for shortness of breath.    Cardiovascular: Negative for chest pain.   Psychiatric/Behavioral: Positive for decreased concentration, sleep disturbance and depressed mood. Negative for suicidal ideas. The patient is nervous/anxious.      Physical Exam:   Physical Exam  Vitals reviewed.   Constitutional:       General: He is not in acute distress.     Appearance: Normal appearance.   Neurological:      Mental Status: He is alert.      Gait: Gait normal.     Vitals:   Blood pressure 120/78, pulse 68, height 182.9 cm (72\"), weight 105 kg (231 lb).    Mental Status Exam:   Hygiene:   fair  Cooperation:  Cooperative  Eye Contact:  Poor  Psychomotor Behavior:  Restless  Affect:  Appropriate  Mood: depressed and anxious  Hopelessness: Denies  Speech:  Rambling  Thought Process:  Linear  Thought Content:  Mood congruent  Suicidal:  None  Homicidal:  None  Hallucinations:  None  Delusion:  None  Memory:  Intact  Orientation:  Person, Place, Time and Situation  Reliability:  fair  Insight:  Poor  Judgement:  Impaired  Impulse Control:  Impaired    Lab Results:   Admission on 08/12/2022, Discharged on 08/16/2022   Component Date Value Ref Range Status   • QT Interval 08/12/2022 380  ms Final   • QTC Interval 08/12/2022 430  ms Final   • Free T4 08/12/2022 1.49  0.93 - 1.70 ng/dL Final   • TSH 08/12/2022 2.100  0.270 - 4.200 uIU/mL Final   • Hemoglobin A1C 08/12/2022 5.20  4.80 - 5.60 % Final   • QT Interval 08/12/2022 384  ms Final   • QTC Interval 08/12/2022 411  ms Final   • Troponin T 08/12/2022 <0.010  0.000 - 0.030 ng/mL Final   • Target HR (85%) 08/13/2022 162  bpm Final   • Max. Pred. HR (100%) 08/13/2022 191  bpm Final   • LA ESV Index (BP) 08/13/2022 13.0  ml/m2 Final   • Avg E/e' ratio 08/13/2022 6.87   Final   • ACS 08/13/2022 2.00  cm Final   • Ao " root diam 08/13/2022 3.2  cm Final   • Ao pk edd 08/13/2022 123.0  cm/sec Final   • Ao V2 VTI 08/13/2022 24.3  cm Final   • EDV(cubed) 08/13/2022 97.3  ml Final   • EDV(MOD-sp4) 08/13/2022 55.2  ml Final   • EF(MOD-sp4) 08/13/2022 67.8  % Final   • ESV(cubed) 08/13/2022 24.4  ml Final   • ESV(MOD-sp4) 08/13/2022 17.8  ml Final   • IVS/LVPW 08/13/2022 0.75  cm Final   • Lat Peak E' Edd 08/13/2022 11.0  cm/sec Final   • LV mass(C)d 08/13/2022 169.9  grams Final   • LVPWd 08/13/2022 1.20  cm Final   • Med Peak E' Edd 08/13/2022 7.0  cm/sec Final   • PA acc time 08/13/2022 0.09  sec Final   • PA pr(Accel) 08/13/2022 40.8  mmHg Final   • SI(MOD-sp4) 08/13/2022 16.6  ml/m2 Final   • SV(MOD-sp4) 08/13/2022 37.4  ml Final   • Ao max PG 08/13/2022 6.1  mmHg Final   • Ao mean PG 08/13/2022 3.0  mmHg Final   • FS 08/13/2022 37.0  % Final   • IVSd 08/13/2022 0.90  cm Final   • LA dimension (2D)  08/13/2022 2.7  cm Final   • LVIDd 08/13/2022 4.6  cm Final   • LVIDs 08/13/2022 2.9  cm Final   • LVOT area 08/13/2022 3.5  cm2 Final   • LVOT diam 08/13/2022 2.10  cm Final   • MV E/A 08/13/2022 0.82   Final   • MV A max edd 08/13/2022 75.3  cm/sec Final   • MV E max edd 08/13/2022 61.8  cm/sec Final   • LV Melchor Vol (BSA corrected) 08/13/2022 24.5  cm2 Final   • LV Sys Vol (BSA corrected) 08/13/2022 7.9  cm2 Final   • TAPSE (>1.6) 08/13/2022 1.87  cm Final   • EF(MOD-bp) 08/13/2022 66  % Final   • Total Cholesterol 08/13/2022 184  0 - 200 mg/dL Final   • Triglycerides 08/13/2022 127  0 - 150 mg/dL Final   • HDL Cholesterol 08/13/2022 22 (A) 40 - 60 mg/dL Final   • LDL Cholesterol  08/13/2022 139 (A) 0 - 100 mg/dL Final   • VLDL Cholesterol 08/13/2022 23  5 - 40 mg/dL Final   • LDL/HDL Ratio 08/13/2022 6.21   Final   • H. pylori IgG 08/14/2022 0.14  0.00 - 0.79 Index Value Final                                 Negative           <0.80                               Equivocal    0.80 - 0.89                               Positive            >0.89   • H. pylori, IgA ABS 08/14/2022 12.0 (A) 0.0 - 8.9 units Final                                    Negative          <9.0                                  Equivocal   9.0 - 11.0                                  Positive         >11.0   • H. Pylori, IgM 08/14/2022 14.0 (A) 0.0 - 8.9 units Final                                    Negative          <9.0                                  Equivocal   9.0 - 11.0                                  Positive         >11.0  This test was developed and its performance characteristics  determined by [facility]. It has not been cleared or approved  by the Food and Drug Administration.       EKG Results:  No orders to display       Assessment & Plan   Problems Addressed this Visit        Neuro    Autism spectrum disorder    Relevant Medications    atomoxetine (Strattera) 40 MG capsule    sertraline (ZOLOFT) 100 MG tablet      Other Visit Diagnoses     History of ADHD    -  Primary    Relevant Medications    atomoxetine (Strattera) 40 MG capsule    Moderate episode of recurrent major depressive disorder (HCC)        Relevant Medications    atomoxetine (Strattera) 40 MG capsule    sertraline (ZOLOFT) 100 MG tablet    Generalized anxiety disorder        Relevant Medications    atomoxetine (Strattera) 40 MG capsule    sertraline (ZOLOFT) 100 MG tablet      Diagnoses       Codes Comments    History of ADHD    -  Primary ICD-10-CM: Z86.59  ICD-9-CM: V11.8     Moderate episode of recurrent major depressive disorder (HCC)     ICD-10-CM: F33.1  ICD-9-CM: 296.32     Generalized anxiety disorder     ICD-10-CM: F41.1  ICD-9-CM: 300.02     Autism spectrum disorder     ICD-10-CM: F84.0  ICD-9-CM: 299.00           Visit Diagnoses:    ICD-10-CM ICD-9-CM   1. History of ADHD  Z86.59 V11.8   2. Moderate episode of recurrent major depressive disorder (HCC)  F33.1 296.32   3. Generalized anxiety disorder  F41.1 300.02   4. Autism spectrum disorder  F84.0 299.00     -Reviewed previous available  documentation and most recent available labs. SARAHI reviewed and is appropriate.     -Discussed importance of counseling to decrease anxiety like symptoms. He is agreeable to see counseling. Discussed coping mechanisms to decrease stress and anxiety: relaxation techniques, guided imagery, music therapy, staying active, support groups, diversional activities and avoid aggravating factors.  Discussed different coping mechanisms to better control depression.    Encouraged patient to practice good sleep hygiene.  Discussed going to bed at the same time and getting up at the same time every day. Consider a quiet activity, such as reading, part of your nighttime routine. Make your bedroom a dark, comfortable place where it is easy to fall asleep. Avoid or limit caffeine consumption. Limit screen use, especially two hours prior to bed (this includes watching TV, using smartphone, tablet or computer).    Discussed plan of care, he feels that he is doing well overall with current medication regimen, without adverse effects.  He does report continued struggle with symptoms associated with both anxiety depression as well as ADHD symptoms.  Discussed medication regimen, he is agreeable to increase sertraline to help with anxiety and depression and will start Strattera to help with ADHD symptoms.      -Increase sertraline from 50 mg to 100 mg daily for depression and anxiety.  -Start Strattera 40 mg daily for ADHD symptoms    GOALS:  Short Term Goals: Patient will be compliant with medication, and patient will have no significant medication related side effects.  Patient will be engaged in psychotherapy as indicated.  Patient will report subjective improvement of symptoms.  Long term goals: To stabilize mood and treat/improve subjective symptoms, the patient will stay out of the hospital, the patient will be at an optimal level of functioning, and the patient will take all medications as prescribed.  The patient/guardian  verbalized understanding and agreement with goals that were mutually set.    TREATMENT PLAN: Continue supportive psychotherapy efforts and medications as indicated for patient's diagnosis.  Pharmacological and Non-Pharmacological treatment options discussed during today's visit. Patient/Guardian acknowledged and verbally consented with current treatment plan and was educated on the importance of compliance with treatment and follow-up appointments.      MEDICATION ISSUES:  Discussed medication options and treatment plan of prescribed medication as well as the risks, benefits, any black box warnings, and side effects including potential falls, possible impaired driving, and metabolic adversities among others. Patient is agreeable to call the office with any worsening of symptoms or onset of side effects, or if any concerns or questions arise.  The contact information for the office is made available to the patient. Patient is agreeable to call 911 or go to the nearest ER should they begin having any SI/HI, or if any urgent concerns arise. No medication side effects or related complaints today.     MEDS ORDERED DURING VISIT:  New Medications Ordered This Visit   Medications   • atomoxetine (Strattera) 40 MG capsule     Sig: Take 1 capsule by mouth Daily for 60 days.     Dispense:  30 capsule     Refill:  1   • sertraline (ZOLOFT) 100 MG tablet     Sig: Take 1 tablet by mouth Daily for 60 days. Indications: Major Depressive Disorder     Dispense:  30 tablet     Refill:  1       FOLLOW UP:  Return in about 6 weeks (around 11/10/2022) for Recheck.             This document has been electronically signed by SHARATH Bullock  September 29, 2022 16:31 EDT    Please note that portions of this note were completed with a voice recognition program. Efforts were made to edit dictation, but occasionally words are mistranscribed.

## 2022-10-11 DIAGNOSIS — F33.1 MODERATE EPISODE OF RECURRENT MAJOR DEPRESSIVE DISORDER: ICD-10-CM

## 2022-10-11 DIAGNOSIS — F41.1 GENERALIZED ANXIETY DISORDER: ICD-10-CM

## 2023-04-22 ENCOUNTER — HOSPITAL ENCOUNTER (EMERGENCY)
Facility: HOSPITAL | Age: 28
Discharge: OTHER INSTITUTION | End: 2023-04-22
Attending: EMERGENCY MEDICINE
Payer: MEDICARE

## 2023-04-22 VITALS
BODY MASS INDEX: 31.29 KG/M2 | HEIGHT: 72 IN | RESPIRATION RATE: 16 BRPM | SYSTOLIC BLOOD PRESSURE: 134 MMHG | DIASTOLIC BLOOD PRESSURE: 99 MMHG | TEMPERATURE: 97.9 F | HEART RATE: 79 BPM | OXYGEN SATURATION: 97 % | WEIGHT: 231 LBS

## 2023-04-22 DIAGNOSIS — K22.2 ESOPHAGEAL OBSTRUCTION DUE TO FOOD IMPACTION: Primary | ICD-10-CM

## 2023-04-22 DIAGNOSIS — T18.128A ESOPHAGEAL OBSTRUCTION DUE TO FOOD IMPACTION: Primary | ICD-10-CM

## 2023-04-22 LAB
HOLD SPECIMEN: NORMAL
HOLD SPECIMEN: NORMAL
WHOLE BLOOD HOLD COAG: NORMAL
WHOLE BLOOD HOLD SPECIMEN: NORMAL

## 2023-04-22 PROCEDURE — 96374 THER/PROPH/DIAG INJ IV PUSH: CPT

## 2023-04-22 PROCEDURE — 25010000002 ONDANSETRON PER 1 MG: Performed by: EMERGENCY MEDICINE

## 2023-04-22 PROCEDURE — 99283 EMERGENCY DEPT VISIT LOW MDM: CPT

## 2023-04-22 PROCEDURE — 96375 TX/PRO/DX INJ NEW DRUG ADDON: CPT

## 2023-04-22 PROCEDURE — 96361 HYDRATE IV INFUSION ADD-ON: CPT

## 2023-04-22 PROCEDURE — 25010000002 GLUCAGON (RDNA) PER 1 MG: Performed by: EMERGENCY MEDICINE

## 2023-04-22 RX ORDER — SODIUM CHLORIDE 9 MG/ML
125 INJECTION, SOLUTION INTRAVENOUS CONTINUOUS
Status: DISCONTINUED | OUTPATIENT
Start: 2023-04-22 | End: 2023-04-22 | Stop reason: HOSPADM

## 2023-04-22 RX ORDER — ONDANSETRON 2 MG/ML
4 INJECTION INTRAMUSCULAR; INTRAVENOUS ONCE
Status: COMPLETED | OUTPATIENT
Start: 2023-04-22 | End: 2023-04-22

## 2023-04-22 RX ADMIN — GLUCAGON 1 MG: KIT at 13:11

## 2023-04-22 RX ADMIN — SODIUM CHLORIDE 125 ML/HR: 9 INJECTION, SOLUTION INTRAVENOUS at 13:10

## 2023-04-22 RX ADMIN — ONDANSETRON 4 MG: 2 INJECTION INTRAMUSCULAR; INTRAVENOUS at 13:11

## 2023-04-22 NOTE — ED PROVIDER NOTES
HPI: [place] is a 28 y.o. male who presents to the emergency department complaining of difficulty swallowing.,  Food bolus.  He states that last night he was eating pork chops when he became choked.  He states that he has not been able to swallow since.  Anything he tries to swallow he spits back up.  He notes a long history of difficulty with his esophagus and swallowing.  He denies any vomiting or other complaints.      REVIEW OF SYSTEMS: All other systems reviewed and are negative     PAST MEDICAL HISTORY:   Past Medical History:   Diagnosis Date   • Allergy to bean 2021    Soybeans   • Asperger syndrome    • Chronic gastritis    • Cow's milk allergy 2021   • Delayed gastric emptying    • Diarrhea    • Dysphagia     MOTHER REPORTS PATIENT HAS RECENTLY HAS GOTTEN CHOKED EASILY AND GETS FOOD STUCK IN THROAT.   • Egg allergy 2021   • Epigastric abdominal pain    • Esophagitis    • GERD (gastroesophageal reflux disease)    • H/O seasonal allergies    • Hearing loss     Patient's mother reported more loss on the left side but that patient has loss bilateral.  No use of hearing devices.    • Obesity    • Self-injurious behavior    • Wears glasses    • Wheat allergy 2021        FAMILY HISTORY:   Family History   Problem Relation Age of Onset   • No Known Problems Mother    • No Known Problems Father    • Colon cancer Neg Hx         SOCIAL HISTORY:   Social History     Socioeconomic History   • Marital status: Single   Tobacco Use   • Smoking status: Never   • Smokeless tobacco: Never   Vaping Use   • Vaping Use: Never used   Substance and Sexual Activity   • Alcohol use: No   • Drug use: No   • Sexual activity: Defer        SURGICAL HISTORY:   Past Surgical History:   Procedure Laterality Date   • EAR TUBES     • ENDOSCOPY N/A 7/15/2017    Procedure: ESOPHAGOGASTRODUODENOSCOPY with bx , removal foreign body;  Surgeon: Saroj Scott MD;  Location: [facility];  Service:    • ENDOSCOPY N/A 8/24/2017     Procedure: ESOPHAGOGASTRODUODENOSCOPY ESOPHAGEAL DILATATION;  Surgeon: Saroj Soctt MD;  Location: [facility] ENDOSCOPY;  Service:    • ENDOSCOPY N/A 4/9/2019    Procedure: ESOPHAGOGASTRODUODENOSCOPY FOR FOREIGN BODY, biopsy, and esophageal dilitation;  Surgeon: Tyrone Herzog MD;  Location: [facility] ENDOSCOPY;  Service: Gastroenterology   • ENDOSCOPY N/A 5/14/2019    Procedure: ESOPHAGOGASTRODUODENOSCOPY with biopsy;  Surgeon: Tyrone Herzog MD;  Location: [facility] ENDOSCOPY;  Service: Gastroenterology   • ENDOSCOPY N/A 3/5/2020    Procedure: ESOPHAGOGASTRODUODENOSCOPY;  Surgeon: Tyrone Herzog MD;  Location: [facility] ENDOSCOPY;  Service: Gastroenterology;  Laterality: N/A;   • ENDOSCOPY N/A 6/18/2020    Procedure: ESOPHAGOGASTRODUODENOSCOPY;  Surgeon: Tyrone Herzog MD;  Location: [facility] ENDOSCOPY;  Service: Gastroenterology;  Laterality: N/A;   • ENDOSCOPY N/A 5/11/2021    Procedure: ESOPHAGOGASTRODUODENOSCOPY WITH SAVORY DILITATION AND BIOPSY;  Surgeon: Nellie Xavier MD;  Location: [facility] ENDOSCOPY;  Service: Gastroenterology;  Laterality: N/A;   • ENDOSCOPY N/A 6/7/2021    Procedure: ESOPHAGOGASTRODUODENOSCOPY WITH SAVORY DILATATION AND BIOPSY ;  Surgeon: Nellie Xavier MD;  Location: [facility] ENDOSCOPY;  Service: Gastroenterology;  Laterality: N/A;   • ENDOSCOPY N/A 7/27/2021    Procedure: ESOPHAGOGASTRODUODENOSCOPY WITH DILATATION AND BIOPSY;  Surgeon: Nellie Xavier MD;  Location: [facility] ENDOSCOPY;  Service: Gastroenterology;  Laterality: N/A;   • ENDOSCOPY N/A 2/5/2022    Procedure: ESOPHAGOGASTRODUODENOSCOPY WITH FOREIGN BODY REMOVAL AND BIOPSY;  Surgeon: Levy Hester MD;  Location: [facility] OR;  Service: General;  Laterality: N/A;   • UPPER GASTROINTESTINAL ENDOSCOPY  04/09/2019   • UPPER GASTROINTESTINAL ENDOSCOPY  05/14/2019        ALLERGIES: Patient has no known allergies.       PHYSICAL EXAM:   VITAL SIGNS:   Vitals:    04/22/23 1206   BP: (!) 153/103   Pulse:    Resp:    Temp:     SpO2:       CONSTITUTIONAL: Awake, well appearing, nontoxic   HENT: Atraumatic, normocephalic, oral mucosa moist, airway patent, controlling secretions. Nares patent without drainage. External ears normal.   EYES: Conjunctivae clear, EOMI, PERRL   NECK: Trachea midline, nontender, supple   CARDIOVASCULAR: Normal heart rate, Normal rhythm.  PULMONARY/CHEST: Normal work of breathing. Clear to auscultation, no rhonchi, wheezes, or rales.  ABDOMINAL: Nondistended, soft, nontender, no rebound or guarding.  NEUROLOGIC: Nonfocal, moves all four extremities, no gross sensory or motor deficits.   EXTREMITIES: No clubbing, cyanosis, or edema   SKIN: Warm, Dry, No erythema, No rash       ED COURSE / MEDICAL DECISION MAKING:     C[place] is a 28 y.o. male who presents to the emergency department for evaluation of difficulty swallowing, esophageal foreign body.  Well-developed, well-nourished young man in no distress with exam as above.  His vital signs are normal.  His oxygen saturation is normal on room air at 99%.  His exam is nonfocal.  We will try conservative measures, may need endoscopy.  No indication for any testing.  Disposition pending.    Differential diagnosis includes esophageal food bolus among other etiologies.    1345  Patient with no relief after glucagon.  Discussed with Dr. Moreno, she does not do EGD for food bolus.  Call placed to GI at [facility].    1508  No return call from [facility]. Discussed with  transfer center, patient has been accepted to the  ED for evaluation.  Patient and mother updated and are agreeable with plan of care.    Final diagnoses:   Esophageal obstruction due to food impaction        Joe Soriano MD  04/22/23 [id number]

## 2023-04-22 NOTE — ED NOTES
Pt transferred to [facility], pt is going up pov with family. Spoke with [facility] and I left the IV in the arm, wrapped with gauze to protect it. Pt ambulatory out of ED with family with out ss of distress.

## 2023-04-22 NOTE — ED NOTES
Contacted [facility] per Price. Will page out to their on call GI and call back. Shruti

## 2023-04-22 NOTE — ED NOTES
Called [facility] at this time per MD Bo request. Requested images from radiology to be power shared. [facility] stated they would call back.

## (undated) DEVICE — CONMED SCOPE SAVER BITE BLOCK, 20X27 MM: Brand: SCOPE SAVER

## (undated) DEVICE — ESOPHAGEAL BALLOON DILATATION CATHETER: Brand: CRE FIXED WIRE

## (undated) DEVICE — VLV SXN AIR/H2O ORCAPOD3 1P/U STRL

## (undated) DEVICE — SUCTION CANISTER, 1500CC, RIGID: Brand: DEROYAL

## (undated) DEVICE — Device

## (undated) DEVICE — FRCP BIOP COLD ENDOJAW ALLGTR W/NDL 2.8X2300MM BLU

## (undated) DEVICE — SYR LUER SLPTP 50ML

## (undated) DEVICE — ENDOSCOPY PORT CONNECTOR FOR OLYMPUS® SCOPES: Brand: ERBE

## (undated) DEVICE — 2000CC GUARDIAN II: Brand: GUARDIAN

## (undated) DEVICE — KT ORCA VLV SXN AIR/H2O W/SEAL 1P/U STRL

## (undated) DEVICE — MEDI-VAC NON-CONDUCTIVE SUCTION TUBING: Brand: CARDINAL HEALTH

## (undated) DEVICE — ENDOGATOR AUXILIARY WATER JET CONNECTOR: Brand: ENDOGATOR

## (undated) DEVICE — GLV SURG SENSICARE W/ALOE PF LF 8.5 STRL

## (undated) DEVICE — HYBRID TUBING/CAP SET FOR OLYMPUS® SCOPES: Brand: ERBE

## (undated) DEVICE — THE DISPOSABLE ROTH NET PLATINUM FOOD BOLUS RETRIEVAL DEVICE IS USED IN THE ENDOSCOPIC RETRIEVAL FOOD BOLUS.: Brand: ROTH NET PLATINUM

## (undated) DEVICE — FRCP BX RADJAW4 NDL 2.8 240 STD OG

## (undated) DEVICE — DEV INFL ALLIANCE2 SYS

## (undated) DEVICE — Device: Brand: DEFENDO AIR/WATER/SUCTION AND BIOPSY VALVE

## (undated) DEVICE — LUBE JELLY PK/2.75GM STRL BX/144

## (undated) DEVICE — JELLY,LUBE,STERILE,FLIP TOP,TUBE,2-OZ: Brand: MEDLINE

## (undated) DEVICE — ENDOGATOR TUBING FOR BOSTON SCIENTIFIC ENDOSTAT II PUMP, OLYMPUS OFP PUMP OR ENDO STRATUS PUMP: Brand: ENDOGATOR

## (undated) DEVICE — HIGH PERFORMANCE BALLOON DILATATION CATHETER: Brand: MAXFORCE TTS